# Patient Record
Sex: MALE | Race: WHITE | Employment: OTHER | URBAN - METROPOLITAN AREA
[De-identification: names, ages, dates, MRNs, and addresses within clinical notes are randomized per-mention and may not be internally consistent; named-entity substitution may affect disease eponyms.]

---

## 2017-07-25 RX ORDER — ALPRAZOLAM 0.5 MG/1
TABLET ORAL
Qty: 90 TAB | Refills: 0 | Status: SHIPPED | OUTPATIENT
Start: 2017-07-25 | End: 2018-01-30 | Stop reason: SDUPTHER

## 2017-07-28 RX ORDER — ALPRAZOLAM 0.5 MG/1
TABLET ORAL
Status: CANCELLED | OUTPATIENT
Start: 2017-07-28

## 2017-07-28 RX ORDER — ALPRAZOLAM 0.5 MG
0.5 TABLET ORAL
Qty: 90 TAB | Refills: 0 | OUTPATIENT
Start: 2017-07-28 | End: 2017-11-03 | Stop reason: SDUPTHER

## 2017-07-28 NOTE — TELEPHONE ENCOUNTER
Requested Prescriptions     Pending Prescriptions Disp Refills    XANAX 0.5 mg tablet 90 Tab 0     Sig: Take 1 Tab by mouth three (3) times daily as needed for Anxiety. Max Daily Amount: 1.5 mg.        Last Refill: 04/19/2017  Next Appointment:12/21/2017

## 2017-10-31 RX ORDER — TIZANIDINE 4 MG/1
TABLET ORAL
Qty: 30 TAB | Refills: 3 | Status: SHIPPED | OUTPATIENT
Start: 2017-10-31 | End: 2018-02-28 | Stop reason: SDUPTHER

## 2017-11-03 RX ORDER — ALPRAZOLAM 0.5 MG
0.5 TABLET ORAL
Qty: 90 TAB | Refills: 0 | OUTPATIENT
Start: 2017-11-03 | End: 2018-04-27 | Stop reason: SDUPTHER

## 2017-11-03 NOTE — TELEPHONE ENCOUNTER
Requested Prescriptions     Pending Prescriptions Disp Refills    XANAX 0.5 mg tablet 90 Tab 0     Sig: Take 1 Tab by mouth three (3) times daily as needed for Anxiety. Max Daily Amount: 1.5 mg.        Last Refill: 07/28/17  Next Appointment:12/21/17

## 2017-11-10 ENCOUNTER — OFFICE VISIT (OUTPATIENT)
Dept: INTERNAL MEDICINE CLINIC | Age: 82
End: 2017-11-10

## 2017-11-10 VITALS
BODY MASS INDEX: 28.08 KG/M2 | HEART RATE: 79 BPM | WEIGHT: 189.6 LBS | RESPIRATION RATE: 18 BRPM | HEIGHT: 69 IN | OXYGEN SATURATION: 98 % | TEMPERATURE: 98 F | DIASTOLIC BLOOD PRESSURE: 90 MMHG | SYSTOLIC BLOOD PRESSURE: 160 MMHG

## 2017-11-10 DIAGNOSIS — I10 ESSENTIAL HYPERTENSION: Primary | ICD-10-CM

## 2017-11-10 RX ORDER — AMLODIPINE BESYLATE 5 MG/1
5 TABLET ORAL DAILY
Qty: 30 TAB | Refills: 6 | Status: SHIPPED | OUTPATIENT
Start: 2017-11-10 | End: 2018-05-26 | Stop reason: SDUPTHER

## 2017-11-10 NOTE — MR AVS SNAPSHOT
Visit Information Date & Time Provider Department Dept. Phone Encounter #  
 11/10/2017  2:20 PM RACHEL Juarez MD [facility] [id number] Follow-up Instructions Return in about 2 weeks (around 11/24/2017) for as scheduled. Your Appointments 12/21/2017  9:50 AM  
Follow Up with RACHEL Juarez MD  
[address] ([address] [facility]) Appt Note: 6 mo; [address] [id number] [address] [address] Upcoming Health Maintenance Date Due DTaP/Tdap/Td series (1 - Tdap) 5/9/1956 ZOSTER VACCINE AGE 60> 3/9/1995 GLAUCOMA SCREENING Q2Y 5/9/2000 Pneumococcal 65+ Low/Medium Risk (1 of 2 - PCV13) 5/9/2000 MEDICARE YEARLY EXAM 5/9/2000 Influenza Age 5 to Adult 8/1/2017 Allergies as of 11/10/2017  Review Complete On: 11/10/2017 By: Taryn Perkins MD  
 No Known Allergies Current Immunizations  Reviewed on 10/6/2015 No immunizations on file. Not reviewed this visit You Were Diagnosed With   
  
 Codes Comments Essential hypertension    -  Primary ICD-10-CM: I10 
ICD-9-CM: 401.9 Vitals BP Pulse Temp Resp Height(growth percentile) Weight(growth percentile) (!) 209/87 (BP 1 Location: Left arm, BP Patient Position: Sitting) 79 98 °F (36.7 °C) (Oral) 18 5' 9\" (1.753 m) 189 lb 9.6 oz (86 kg) SpO2 BMI Smoking Status 98% 28 kg/m2 Never Smoker Vitals History BMI and BSA Data Body Mass Index Body Surface Area  
 28 kg/m 2 2.05 m 2 Preferred Pharmacy Pharmacy Name Phone [id number] [address] [phone] Your Updated Medication List  
  
   
This list is accurate as of: 11/10/17  4:42 PM.  Always use your most recent med list.  
  
  
  
  
 * ALPRAZolam 0.5 mg tablet Commonly known as:  XANAX  
TAKE 1 TABLET BY MOUTH 3 TIMES A DAY AS NEEDED * XANAX 0.5 mg tablet Generic drug:  ALPRAZolam  
Take 1 Tab by mouth three (3) times daily as needed for Anxiety. Max Daily Amount: 1.5 mg.  
  
 amLODIPine 5 mg tablet Commonly known as:  Wandra Estrella Take 1 Tab by mouth daily. diazePAM 5 mg tablet Commonly known as:  VALIUM Take 1 Tab by mouth every six (6) hours as needed. Max Daily Amount: 20 mg.  
  
 gabapentin 300 mg capsule Commonly known as:  NEURONTIN Take 300 mg by mouth three (3) times daily. oxyCODONE IR 5 mg immediate release tablet Commonly known as:  Chares Ravel Take 1-2 Tabs by mouth every three (3) hours as needed. Max Daily Amount: 80 mg.  
  
 tiZANidine 4 mg tablet Commonly known as:  Natalia Banter TAKE 1 TABLET BY MOUTH AT BEDTIME  
  
 TYLENOL 325 mg tablet Generic drug:  acetaminophen Take 650 mg by mouth every four (4) hours as needed for Pain. * Notice: This list has 2 medication(s) that are the same as other medications prescribed for you. Read the directions carefully, and ask your doctor or other care provider to review them with you. Prescriptions Sent to Pharmacy Refills  
 amLODIPine (NORVASC) 5 mg tablet 6 Sig: Take 1 Tab by mouth daily. Class: Normal  
 Pharmacy: [id number]- [address]  #: [phone] Route: Oral  
  
Follow-up Instructions Return in about 2 weeks (around 11/24/2017) for as scheduled. Introducing [employer]! Bentley Bell introduces [employer] patient portal. Now you can access parts of your medical record, email your doctor's office, and request medication refills online. 1. In your internet browser, go to [email or web address] 2. Click on the First Time User? Click Here link in the Sign In box. You will see the New Member Sign Up page. 3. Enter your CallResto Access Code exactly as it appears below. You will not need to use this code after youve completed the sign-up process. If you do not sign up before the expiration date, you must request a new code. · CallResto Access Code: [email or web address] Expires: 2/8/2018  2:08 PM 
 
4. Enter the last four digits of your Social Security Number (xxxx) and Date of Birth (mm/dd/yyyy) as indicated and click Submit. You will be taken to the next sign-up page. 5. Create a GeoVantaget ID. This will be your CallResto login ID and cannot be changed, so think of one that is secure and easy to remember. 6. Create a CallResto password. You can change your password at any time. 7. Enter your Password Reset Question and Answer. This can be used at a later time if you forget your password. 8. Enter your e-mail address. You will receive e-mail notification when new information is available in [address] 9. Click Sign Up. You can now view and download portions of your medical record. 10. Click the Download Summary menu link to download a portable copy of your medical information. If you have questions, please visit the Frequently Asked Questions section of the CallResto website. Remember, CallResto is NOT to be used for urgent needs. For medical emergencies, dial 911. Now available from your iPhone and Android! Please provide this summary of care documentation to your next provider. Your primary care clinician is listed as RACHEL Porter. If you have any questions after today's visit, please call [phone].

## 2017-11-10 NOTE — PROGRESS NOTES
Identified pt with two pt identifiers(name and ). Reviewed record in preparation for visit and have obtained necessary documentation. Chief Complaint   Patient presents with    Elevated Blood Pressure     Room 7        Health Maintenance Due   Topic    DTaP/Tdap/Td series (1 - Tdap)    ZOSTER VACCINE AGE 60>     GLAUCOMA SCREENING Q2Y     Pneumococcal 65+ Low/Medium Risk (1 of 2 - PCV13)    MEDICARE YEARLY EXAM     Influenza Age 5 to Adult    Patient declined flu vaccination today. Coordination of Care Questionnaire:  :   1) Have you been to an emergency room, urgent care clinic since your last visit? no   Hospitalized since your last visit? no             2. Have seen or consulted any other health care provider since your last visit? NO  If yes, where when, and reason for visit? 3) Do you have an Advanced Directive/ Living Will in place? NO  If yes, do we have a copy on file NO  If no, would you like information NO    Patient is accompanied by self I have received verbal consent from Martinez Domingo to discuss any/all medical information while they are present in the room.

## 2017-11-10 NOTE — PROGRESS NOTES
This note will not be viewable in [address] Ramiro Mccain is a 80 y.o. male and presents with Elevated Blood Pressure (Room 7)  . Subjective:  Mr. Reyes Formosa presents today with complaint of elevated blood pressure. He was noted to be elevated and his dentist office. He is monitored at home and is not consistently elevated but it has been over the past couple of days. He is also complained of a slight headache with this. He was previously on blood pressure medication but has been off of these for at least the past couple of years. He denies any use of decongestants or continuous use of nonsteroidals. Past Medical History:   Diagnosis Date    Chronic pain     chronic back pain    Colon polyps      Past Surgical History:   Procedure Laterality Date    COLONOSCOPY,DIAGNOSTIC  11/11/2014         HX HERNIA REPAIR      HX ORTHOPAEDIC  2008    rods placed in back    HX ORTHOPAEDIC  10/6/15    LEFT L5-S1 MICRODISCECTOMY     HX OTHER SURGICAL      steroid injection for back pain    HI EGD INSERT GUIDE WIRE DILATOR PASSAGE ESOPHAGUS  10/17/2013         HI EGD TRANSORAL BIOPSY SINGLE/MULTIPLE  10/17/2013          No Known Allergies  Current Outpatient Prescriptions   Medication Sig Dispense Refill    amLODIPine (NORVASC) 5 mg tablet Take 1 Tab by mouth daily. 30 Tab 6    tiZANidine (ZANAFLEX) 4 mg tablet TAKE 1 TABLET BY MOUTH AT BEDTIME 30 Tab 3    ALPRAZolam (XANAX) 0.5 mg tablet TAKE 1 TABLET BY MOUTH 3 TIMES A DAY AS NEEDED 90 Tab 0    gabapentin (NEURONTIN) 300 mg capsule Take 300 mg by mouth three (3) times daily.  acetaminophen (TYLENOL) 325 mg tablet Take 650 mg by mouth every four (4) hours as needed for Pain.  XANAX 0.5 mg tablet Take 1 Tab by mouth three (3) times daily as needed for Anxiety. Max Daily Amount: 1.5 mg. 90 Tab 0    diazepam (VALIUM) 5 mg tablet Take 1 Tab by mouth every six (6) hours as needed.  Max Daily Amount: 20 mg. 40 Tab 0    oxyCODONE IR (ROXICODONE) 5 mg immediate release tablet Take 1-2 Tabs by mouth every three (3) hours as needed. Max Daily Amount: 80 mg. 80 Tab 0     Social History     Social History    Marital status:      Spouse name: N/A    Number of children: N/A    Years of education: N/A     Social History Main Topics    Smoking status: Never Smoker    Smokeless tobacco: Never Used    Alcohol use No    Drug use: No    Sexual activity: Not Asked     Other Topics Concern    None     Social History Narrative     Family History   Problem Relation Age of Onset    Lung Disease Mother      lung cancer    Hypertension Mother     Heart Failure Mother     Hypertension Father     Heart Failure Father        Review of Systems  Constitutional:  negative for fevers, chills, anorexia and weight loss  Eyes:    negative for visual disturbance and irritation  ENT:    negative for tinnitus,sore throat,nasal congestion,ear pains. hoarseness  Respiratory:     negative for cough, hemoptysis, dyspnea,wheezing  CV:    negative for chest pain, palpitations, lower extremity edema  GI:    negative for nausea, vomiting, diarrhea, abdominal pain,melena  Endo:               negative for polyuria,polydipsia,polyphagia,heat intolerance  Genitourinary : negative for frequency, dysuria and hematuria  Integumentary: negative for rash and pruritus  Hematologic:   negative for easy bruising and gum/nose bleeding  Musculoskel:  negative for myalgias, arthralgias, back pain, muscle weakness, joint pain  Neurological:   negative for headaches, dizziness, vertigo, memory problems and gait   Behavl/Psych:  negative for feelings of anxiety, depression, mood changes  ROS otherwise negative      Objective:  Visit Vitals    BP (!) 209/87 (BP 1 Location: Left arm, BP Patient Position: Sitting)    Pulse 79    Temp 98 °F (36.7 °C) (Oral)    Resp 18    Ht 5' 9\" (1.753 m)    Wt 189 lb 9.6 oz (86 kg)    SpO2 98%    BMI 28 kg/m2     Physical Exam:   General appearance - alert, well appearing, and in no distress  Mental status - alert, oriented to person, place, and time  EYE-TRACY, EOMI, fundi normal, corneas normal, no foreign bodies  ENT-ENT exam normal, no neck nodes or sinus tenderness  Nose - normal and patent, no erythema, discharge or polyps  Mouth - mucous membranes moist, pharynx normal without lesions  Neck - supple, no significant adenopathy   Chest - clear to auscultation, no wheezes, rales or rhonchi, symmetric air entry   Heart - normal rate, regular rhythm, normal S1, S2, no murmurs, rubs, clicks or gallops   Abdomen - soft, nontender, nondistended, no masses or organomegaly  Lymph- no adenopathy palpable  Ext-peripheral pulses normal, no pedal edema, no clubbing or cyanosis  Skin-Warm and dry. no hyperpigmentation, vitiligo, or suspicious lesions  Neuro -alert, oriented, normal speech, no focal findings or movement disorder noted      Assessment/Plan:  Diagnoses and all orders for this visit:    1. Essential hypertension    Other orders  -     amLODIPine (NORVASC) 5 mg tablet; Take 1 Tab by mouth daily. ICD-10-CM ICD-9-CM    1. Essential hypertension I10 401.9      Plan:    Start amlodipine 5 mg daily. Monitor response to therapy. He will do home blood pressure readings and call me in 2 weeks to let me know how he is doing. Plan follow-up in December as scheduled for routine health maintenance exam.    Follow-up Disposition:  Return in about 2 weeks (around 11/24/2017) for as scheduled. I have reviewed with the patient details of the assessment and plan and all questions were answered. Relevent patient education was performed. Verbal and/or written instructions (see AVS) provided. The most recent lab findings were reviewed with the patient. Plan was discussed with patient who verbally expressed understanding. An After Visit Summary was printed and given to the patient.     Marissa Lloyd MD

## 2017-12-15 PROBLEM — R13.10 DYSPHAGIA: Status: ACTIVE | Noted: 2017-12-15

## 2017-12-15 PROBLEM — M54.30 SCIATICA: Status: ACTIVE | Noted: 2017-12-15

## 2017-12-15 PROBLEM — G47.00 INSOMNIA: Status: ACTIVE | Noted: 2017-12-15

## 2017-12-15 PROBLEM — J30.9 ALLERGIC RHINITIS: Status: ACTIVE | Noted: 2017-12-15

## 2017-12-15 PROBLEM — Z12.5 PROSTATE CANCER SCREENING: Status: ACTIVE | Noted: 2017-12-15

## 2017-12-15 PROBLEM — E78.5 HYPERLIPIDEMIA: Status: ACTIVE | Noted: 2017-12-15

## 2017-12-15 PROBLEM — L30.8 HERPES ZOSTER DERMATITIS: Status: ACTIVE | Noted: 2017-12-15

## 2017-12-15 PROBLEM — K59.00 CONSTIPATION: Status: ACTIVE | Noted: 2017-12-15

## 2017-12-15 PROBLEM — H60.90 OTITIS EXTERNA: Status: ACTIVE | Noted: 2017-12-15

## 2017-12-15 PROBLEM — R53.83 FATIGUE: Status: ACTIVE | Noted: 2017-12-15

## 2017-12-15 PROBLEM — A87.9 MENINGITIS, VIRAL: Status: ACTIVE | Noted: 2017-12-15

## 2017-12-15 PROBLEM — M48.00 SPINAL STENOSIS: Status: ACTIVE | Noted: 2017-12-15

## 2017-12-15 PROBLEM — M53.87 SCIATICA OF LEFT SIDE ASSOCIATED WITH DISORDER OF LUMBOSACRAL SPINE: Status: ACTIVE | Noted: 2017-12-15

## 2017-12-15 PROBLEM — R31.9 HEMATURIA: Status: ACTIVE | Noted: 2017-12-15

## 2017-12-15 PROBLEM — B02.8 HERPES ZOSTER DERMATITIS: Status: ACTIVE | Noted: 2017-12-15

## 2017-12-15 PROBLEM — E87.5 HYPERKALEMIA: Status: ACTIVE | Noted: 2017-12-15

## 2017-12-15 PROBLEM — R03.0 ELEVATED BLOOD PRESSURE READING: Status: ACTIVE | Noted: 2017-12-15

## 2017-12-15 PROBLEM — M19.90 OSTEOARTHRITIS: Status: ACTIVE | Noted: 2017-12-15

## 2017-12-15 PROBLEM — I10 HYPERTENSION: Status: ACTIVE | Noted: 2017-12-15

## 2017-12-15 PROBLEM — M17.12 ARTHRITIS OF KNEE, LEFT: Status: ACTIVE | Noted: 2017-12-15

## 2017-12-15 PROBLEM — R51.9 HEADACHE: Status: ACTIVE | Noted: 2017-12-15

## 2017-12-15 RX ORDER — DICLOFENAC SODIUM 10 MG/G
GEL TOPICAL 4 TIMES DAILY
COMMUNITY
End: 2018-08-03

## 2017-12-21 ENCOUNTER — OFFICE VISIT (OUTPATIENT)
Dept: INTERNAL MEDICINE CLINIC | Age: 82
End: 2017-12-21

## 2017-12-21 VITALS
TEMPERATURE: 97.6 F | DIASTOLIC BLOOD PRESSURE: 69 MMHG | HEIGHT: 69 IN | SYSTOLIC BLOOD PRESSURE: 144 MMHG | HEART RATE: 69 BPM | WEIGHT: 182 LBS | OXYGEN SATURATION: 93 % | RESPIRATION RATE: 17 BRPM | BODY MASS INDEX: 26.96 KG/M2

## 2017-12-21 DIAGNOSIS — E78.00 PURE HYPERCHOLESTEROLEMIA: ICD-10-CM

## 2017-12-21 DIAGNOSIS — R53.83 FATIGUE, UNSPECIFIED TYPE: ICD-10-CM

## 2017-12-21 DIAGNOSIS — I10 ESSENTIAL HYPERTENSION: Primary | ICD-10-CM

## 2017-12-21 LAB
ALBUMIN SERPL-MCNC: 4.1 G/DL (ref 3.9–5.4)
ALKALINE PHOS POC: 71 U/L (ref 38–126)
ALT SERPL-CCNC: 19 U/L (ref 9–52)
AST SERPL-CCNC: 18 U/L (ref 14–36)
BACTERIA UA POCT, BACTPOCT: ABNORMAL
BILIRUB UR QL STRIP: NEGATIVE
BUN BLD-MCNC: 10 MG/DL (ref 9–20)
CALCIUM BLD-MCNC: 9.3 MG/DL (ref 8.4–10.2)
CASTS UA POCT: 0
CHLORIDE BLD-SCNC: 103 MMOL/L (ref 98–107)
CHOLEST SERPL-MCNC: 176 MG/DL (ref 0–200)
CLUE CELLS, CLUEPOCT: NEGATIVE
CO2 POC: 31 MMOL/L (ref 22–32)
CREAT BLD-MCNC: 0.8 MG/DL (ref 0.8–1.5)
CRYSTALS UA POCT, CRYSPOCT: NEGATIVE
EGFR (POC): 83.2
EPITHELIAL CELLS POCT: ABNORMAL
GLUCOSE POC: 93 MG/DL (ref 75–110)
GLUCOSE UR-MCNC: NEGATIVE MG/DL
GRAN# POC: 5.9 K/UL (ref 2–7.8)
GRAN% POC: 78.7 % (ref 37–92)
HCT VFR BLD CALC: 42.4 % (ref 37–51)
HDLC SERPL-MCNC: 52 MG/DL (ref 35–130)
HGB BLD-MCNC: 14.2 G/DL (ref 12–18)
KETONES P FAST UR STRIP-MCNC: NEGATIVE MG/DL
LDL CHOLESTEROL POC: 101.2 MG/DL (ref 0–130)
LY# POC: 1.3 K/UL (ref 0.6–4.1)
LY% POC: 17.7 % (ref 10–58.5)
MCH RBC QN: 32.2 PG (ref 26–32)
MCHC RBC-ENTMCNC: 33.6 G/DL (ref 30–36)
MCV RBC: 96 FL (ref 80–97)
MID #, POC: 0.2 K/UL (ref 0–1.8)
MID% POC: 3.6 % (ref 0.1–24)
MUCUS UA POCT, MUCPOCT: ABNORMAL
PH UR STRIP: 6.5 [PH] (ref 5–7)
PLATELET # BLD: 204 K/UL (ref 140–440)
POTASSIUM SERPL-SCNC: 4.5 MMOL/L (ref 3.6–5)
PROT SERPL-MCNC: 6.8 G/DL (ref 6.3–8.2)
PROT UR QL STRIP: NEGATIVE
RBC # BLD: 4.41 M/UL (ref 4.2–6.3)
RBC UA POCT, RBCPOCT: ABNORMAL
SODIUM SERPL-SCNC: 144 MMOL/L (ref 137–145)
SP GR UR STRIP: 1 (ref 1.01–1.02)
TCHOL/HDL RATIO (POC): 3.4 (ref 0–4)
TOTAL BILIRUBIN POC: 0.8 MG/DL (ref 0.2–1.3)
TRICH UA POCT, TRICHPOC: NEGATIVE
TRIGL SERPL-MCNC: 114 MG/DL (ref 0–200)
UA UROBILINOGEN AMB POC: NORMAL (ref 0.2–1)
URINALYSIS CLARITY POC: CLEAR
URINALYSIS COLOR POC: ABNORMAL
URINE BLOOD POC: NEGATIVE
URINE CULT COMMENT, POCT: ABNORMAL
URINE LEUKOCYTES POC: NEGATIVE
URINE NITRITES POC: NEGATIVE
VLDLC SERPL CALC-MCNC: 22.8 MG/DL
WBC # BLD: 7.4 K/UL (ref 4.1–10.9)
WBC UA POCT, WBCPOCT: 0
YEAST UA POCT, YEASTPOC: NEGATIVE

## 2017-12-21 NOTE — PROGRESS NOTES
Chief Complaint   Patient presents with    Complete Physical    Cold Symptoms     1. Have you been to the ER, urgent care clinic since your last visit? Hospitalized since your last visit? NO    2. Have you seen or consulted any other health care providers outside of the [address] [place] [address] since your last visit? Include any pap smears or colon screening.  NO

## 2017-12-21 NOTE — MR AVS SNAPSHOT
Visit Information Date & Time Provider Department Dept. Phone Encounter #  
 12/21/2017  9:50 AM RACHEL Barr MD [facility] [employer] [facility] [phone] [id number] Follow-up Instructions Return in about 6 months (around 6/21/2018) for follow up. Upcoming Health Maintenance Date Due DTaP/Tdap/Td series (1 - Tdap) 5/9/1956 ZOSTER VACCINE AGE 60> 3/9/1995 GLAUCOMA SCREENING Q2Y 5/9/2000 Pneumococcal 65+ Low/Medium Risk (1 of 2 - PCV13) 5/9/2000 MEDICARE YEARLY EXAM 5/9/2000 Influenza Age 5 to Adult 8/1/2017 Allergies as of 12/21/2017  Review Complete On: 12/21/2017 By: Ash Will MD  
 No Known Allergies Current Immunizations  Reviewed on 10/6/2015 No immunizations on file. Not reviewed this visit You Were Diagnosed With   
  
 Codes Comments Essential hypertension    -  Primary ICD-10-CM: I10 
ICD-9-CM: 401.9 Pure hypercholesterolemia     ICD-10-CM: E78.00 ICD-9-CM: 272.0 Fatigue, unspecified type     ICD-10-CM: R53.83 ICD-9-CM: 780.79 Vitals BP Pulse Temp Resp Height(growth percentile) Weight(growth percentile) 144/69 69 97.6 °F (36.4 °C) (Oral) 17 5' 9\" (1.753 m) 182 lb (82.6 kg) SpO2 BMI Smoking Status 93% 26.88 kg/m2 Never Smoker Vitals History BMI and BSA Data Body Mass Index Body Surface Area  
 26.88 kg/m 2 2.01 m 2 Preferred Pharmacy Pharmacy Name Phone [id number] [address] [phone] Your Updated Medication List  
  
   
This list is accurate as of: 12/21/17 11:17 AM.  Always use your most recent med list.  
  
  
  
  
 * ALPRAZolam 0.5 mg tablet Commonly known as:  XANAX  
TAKE 1 TABLET BY MOUTH 3 TIMES A DAY AS NEEDED * XANAX 0.5 mg tablet Generic drug:  ALPRAZolam  
Take 1 Tab by mouth three (3) times daily as needed for Anxiety.  Max Daily Amount: 1.5 mg.  
  
 amLODIPine 5 mg tablet Commonly known as:  Suzon Salle Take 1 Tab by mouth daily. gabapentin 300 mg capsule Commonly known as:  NEURONTIN Take 300 mg by mouth three (3) times daily. tiZANidine 4 mg tablet Commonly known as:  Katia Mail TAKE 1 TABLET BY MOUTH AT BEDTIME  
  
 TYLENOL 325 mg tablet Generic drug:  acetaminophen Take 650 mg by mouth every four (4) hours as needed for Pain. VOLTAREN 1 % Gel Generic drug:  diclofenac Apply  to affected area four (4) times daily. * Notice: This list has 2 medication(s) that are the same as other medications prescribed for you. Read the directions carefully, and ask your doctor or other care provider to review them with you. We Performed the Following AMB POC COMPLETE CBC,AUTOMATED ENTER D847887 CPT(R)] AMB POC COMPREHENSIVE METABOLIC PANEL [04950 CPT(R)] AMB POC LIPID PROFILE [26003 CPT(R)] AMB POC URINALYSIS DIP STICK AUTO W/ MICRO  [20255 CPT(R)] Follow-up Instructions Return in about 6 months (around 6/21/2018) for follow up. Introducing [facility]! Destiney Henderson introduces POPVOX patient portal. Now you can access parts of your medical record, email your doctor's office, and request medication refills online. 1. In your internet browser, go to [email or web address] 2. Click on the First Time User? Click Here link in the Sign In box. You will see the New Member Sign Up page. 3. Enter your POPVOX Access Code exactly as it appears below. You will not need to use this code after youve completed the sign-up process. If you do not sign up before the expiration date, you must request a new code. · POPVOX Access Code: [email or web address] Expires: 2/8/2018  2:08 PM 
 
4. Enter the last four digits of your Social Security Number (xxxx) and Date of Birth (mm/dd/yyyy) as indicated and click Submit. You will be taken to the next sign-up page. 5. Create a Rachio ID. This will be your Rachio login ID and cannot be changed, so think of one that is secure and easy to remember. 6. Create a Rachio password. You can change your password at any time. 7. Enter your Password Reset Question and Answer. This can be used at a later time if you forget your password. 8. Enter your e-mail address. You will receive e-mail notification when new information is available in [address] 9. Click Sign Up. You can now view and download portions of your medical record. 10. Click the Download Summary menu link to download a portable copy of your medical information. If you have questions, please visit the Frequently Asked Questions section of the Rachio website. Remember, Rachio is NOT to be used for urgent needs. For medical emergencies, dial 911. Now available from your iPhone and Android! Please provide this summary of care documentation to your next provider. Your primary care clinician is listed as RACHEL Yang. If you have any questions after today's visit, please call [phone].

## 2017-12-22 NOTE — PROGRESS NOTES
This note will not be viewable in [address] Ash Gay is a 80 y.o. male and presents with Complete Physical and Cold Symptoms  . Subjective:  Mr. Berta Almanzar presents today for follow-up routine health maintenance exam.  He has had symptoms of sinus congestion and drainage of clear color for the past few days. He has been outside gathering leaves and thinks this may have triggered his reaction. He said no fever chills rigors or pleuritic chest pain. He denies any shortness of breath, chest pain, PND, orthopnea, or pedal edema. His biggest concern is that his legs have become weak and he has persistent back pain and leg weakness. He is status post previous lumbar laminectomy for spinal stenosis. He remains on Norvasc for hypertension. He brings recordings of his blood pressures from home which are mostly adequate with some mildly elevated blood pressures intermittently. He denies any side effects from his medication.     Past Medical History:   Diagnosis Date    Allergic rhinitis 12/15/2017    Arthritis of knee, left 12/15/2017    Chronic pain     chronic back pain    Colon polyps     Constipation 12/15/2017    Dysphagia 12/15/2017    Elevated blood pressure reading 12/15/2017    Fatigue 12/15/2017    Headache 12/15/2017    Hematuria 12/15/2017    Herpes zoster dermatitis 12/15/2017    Hyperkalemia 12/15/2017    Hyperlipidemia 12/15/2017    Hypertension 12/15/2017    Insomnia 12/15/2017    Meningitis, viral 12/15/2017    Osteoarthritis 12/15/2017    Otitis externa 12/15/2017    Prostate cancer screening 12/15/2017    Sciatica 12/15/2017    Sciatica of left side associated with disorder of lumbosacral spine 12/15/2017    Spinal stenosis 12/15/2017     Past Surgical History:   Procedure Laterality Date    COLONOSCOPY,DIAGNOSTIC  11/11/2014         HX HERNIA REPAIR      HX ORTHOPAEDIC  2008    rods placed in back    HX ORTHOPAEDIC  10/6/15    LEFT L5-S1 MICRODISCECTOMY     HX OTHER SURGICAL      steroid injection for back pain    OK EGD INSERT GUIDE WIRE DILATOR PASSAGE ESOPHAGUS  10/17/2013         OK EGD TRANSORAL BIOPSY SINGLE/MULTIPLE  10/17/2013          No Known Allergies  Current Outpatient Prescriptions   Medication Sig Dispense Refill    diclofenac (VOLTAREN) 1 % gel Apply  to affected area four (4) times daily.  amLODIPine (NORVASC) 5 mg tablet Take 1 Tab by mouth daily. 30 Tab 6    XANAX 0.5 mg tablet Take 1 Tab by mouth three (3) times daily as needed for Anxiety. Max Daily Amount: 1.5 mg. 90 Tab 0    tiZANidine (ZANAFLEX) 4 mg tablet TAKE 1 TABLET BY MOUTH AT BEDTIME 30 Tab 3    ALPRAZolam (XANAX) 0.5 mg tablet TAKE 1 TABLET BY MOUTH 3 TIMES A DAY AS NEEDED 90 Tab 0    gabapentin (NEURONTIN) 300 mg capsule Take 300 mg by mouth three (3) times daily.  acetaminophen (TYLENOL) 325 mg tablet Take 650 mg by mouth every four (4) hours as needed for Pain. Social History     Social History    Marital status:      Spouse name: N/A    Number of children: N/A    Years of education: N/A     Social History Main Topics    Smoking status: Never Smoker    Smokeless tobacco: Never Used    Alcohol use No    Drug use: No    Sexual activity: Not Asked     Other Topics Concern    None     Social History Narrative     Family History   Problem Relation Age of Onset    Lung Disease Mother      lung cancer    Hypertension Mother     Heart Failure Mother     Hypertension Father     Heart Failure Father        Review of Systems  Constitutional:  negative for fevers, chills, anorexia and weight loss  Eyes:    negative for visual disturbance and irritation  ENT:    negative for tinnitus,sore throat,nasal congestion,ear pains. hoarseness  Respiratory:     negative for cough, hemoptysis, dyspnea,wheezing  CV:    negative for chest pain, palpitations, lower extremity edema  GI:    negative for nausea, vomiting, diarrhea, abdominal pain,melena  Endo: negative for polyuria,polydipsia,polyphagia,heat intolerance  Genitourinary : negative for frequency, dysuria and hematuria  Integumentary: negative for rash and pruritus  Hematologic:   negative for easy bruising and gum/nose bleeding  Musculoskel:  negative for myalgias, arthralgias, back pain, muscle weakness, joint pain  Neurological:   negative for headaches, dizziness, vertigo, memory problems and gait   Behavl/Psych:  negative for feelings of anxiety, depression  ROS otherwise negative      Objective:  Visit Vitals    /69    Pulse 69    Temp 97.6 °F (36.4 °C) (Oral)    Resp 17    Ht 5' 9\" (1.753 m)    Wt 182 lb (82.6 kg)    SpO2 93%    BMI 26.88 kg/m2     Physical Exam:   General appearance - alert, well appearing, and in no distress  Mental status - alert, oriented to person, place, and time  EYE-TRACY, EOMI, fundi normal, corneas normal, no foreign bodies  ENT-ENT exam normal, no neck nodes or sinus tenderness  Nose - normal and patent, no erythema, discharge or polyps  Mouth - mucous membranes moist, pharynx normal without lesions  Neck - supple, no significant adenopathy   Chest - clear to auscultation, no wheezes, rales or rhonchi, symmetric air entry   Heart - normal rate, regular rhythm, normal S1, S2, no murmurs, rubs, clicks or gallops   Abdomen - soft, nontender, nondistended, no masses or organomegaly  Lymph- no adenopathy palpable  Ext-peripheral pulses normal, no pedal edema, no clubbing or cyanosis  Skin-Warm and dry. no hyperpigmentation, vitiligo, or suspicious lesions  Neuro -alert, oriented, normal speech, no focal findings or movement disorder noted      Assessment/Plan:  Diagnoses and all orders for this visit:    1. Essential hypertension  -     AMB POC COMPREHENSIVE METABOLIC PANEL  -     AMB POC URINALYSIS DIP STICK AUTO W/ MICRO     2. Pure hypercholesterolemia  -     AMB POC LIPID PROFILE    3.  Fatigue, unspecified type  -     AMB POC COMPLETE CBC,AUTOMATED ENTER ICD-10-CM ICD-9-CM    1. Essential hypertension I10 401.9 AMB POC COMPREHENSIVE METABOLIC PANEL      AMB POC URINALYSIS DIP STICK AUTO W/ MICRO    2. Pure hypercholesterolemia E78.00 272.0 AMB POC LIPID PROFILE   3. Fatigue, unspecified type R53.83 780.79 AMB POC COMPLETE CBC,AUTOMATED ENTER     Plan:    I suspect his sinus symptoms are allergic in nature and should resolve without event. He may take an over-the-counter antihistamine as needed. If his symptoms persist return to clinic or call for further instructions. Continue current medical regimen as outlined above. Further recommendations based on lab results. We discussed the fact that physical therapy may be helpful in terms of helping maintain some dexterity and strength of his lower extremities. If his symptoms progress this may be pursued. He is done therapy before and can continue the home exercise program he was given previously. If his feelings of fatigue persist may consider underlying depression as a cause. Consider addition of an SSRI should his symptoms progress. Patient and his wife are traveling to [place] for the winter and will return in several months. Follow-up Disposition:  Return in about 6 months (around 6/21/2018) for follow up. I have reviewed with the patient details of the assessment and plan and all questions were answered. Relevent patient education was performed. Verbal and/or written instructions (see AVS) provided. The most recent lab findings were reviewed with the patient. Plan was discussed with patient who verbally expressed understanding. An After Visit Summary was printed and given to the patient.     Chan Massey MD

## 2018-01-30 DIAGNOSIS — F41.9 ANXIETY: Primary | ICD-10-CM

## 2018-01-30 RX ORDER — ALPRAZOLAM 0.5 MG/1
TABLET ORAL
Qty: 90 TAB | Refills: 0 | Status: SHIPPED | OUTPATIENT
Start: 2018-01-30 | End: 2018-10-21 | Stop reason: SDUPTHER

## 2018-01-30 NOTE — TELEPHONE ENCOUNTER
Requested Prescriptions     Pending Prescriptions Disp Refills    ALPRAZolam (XANAX) 0.5 mg tablet 90 Tab 0       Last Refill: 7/25/17  Next Appointment:6/20/18

## 2018-02-28 RX ORDER — TIZANIDINE 4 MG/1
TABLET ORAL
Qty: 30 TAB | Refills: 0 | Status: SHIPPED | OUTPATIENT
Start: 2018-02-28 | End: 2018-07-26 | Stop reason: SDUPTHER

## 2018-04-09 RX ORDER — TIZANIDINE 4 MG/1
TABLET ORAL
Qty: 30 TAB | Refills: 3 | Status: SHIPPED | OUTPATIENT
Start: 2018-04-09 | End: 2018-08-05 | Stop reason: SDUPTHER

## 2018-04-27 DIAGNOSIS — F41.1 GAD (GENERALIZED ANXIETY DISORDER): Primary | ICD-10-CM

## 2018-04-27 NOTE — TELEPHONE ENCOUNTER
Requested Prescriptions     Pending Prescriptions Disp Refills    XANAX 0.5 mg tablet 90 Tab 0     Sig: Take 1 Tab by mouth three (3) times daily as needed for Anxiety. Max Daily Amount: 1.5 mg.        Last Refill: 11/03/17 Next Appointment:06/20/18

## 2018-04-30 ENCOUNTER — TELEPHONE (OUTPATIENT)
Dept: INTERNAL MEDICINE CLINIC | Age: 83
End: 2018-04-30

## 2018-04-30 RX ORDER — ALPRAZOLAM 0.5 MG
0.5 TABLET ORAL
Qty: 90 TAB | Refills: 0 | OUTPATIENT
Start: 2018-04-30 | End: 2018-07-26 | Stop reason: SDUPTHER

## 2018-04-30 NOTE — TELEPHONE ENCOUNTER
A Xanax script was called into the [facility] pharmacy on 4/27/18 by Charli Zavala LPN. The pharmacy stated they never received this script.   This nurse called the Xanax script into [facility] pharmacy and spoke with Robby Evangelista the pharmacist.    Xanax 0.5 mg tablet  Take 1 tab by mouth three (3) times daily as needed for Anxiety  Max Daily Amount 1.5 mg  #90  0 refills  Walker Newell LPN

## 2018-05-25 ENCOUNTER — HOSPITAL ENCOUNTER (OUTPATIENT)
Dept: MRI IMAGING | Age: 83
Discharge: HOME | End: 2018-05-25
Attending: ORTHOPAEDIC SURGERY
Payer: MEDICARE

## 2018-05-25 DIAGNOSIS — M54.5 LOW BACK PAIN, UNSPECIFIED BACK PAIN LATERALITY, UNSPECIFIED CHRONICITY, WITH SCIATICA PRESENCE UNSPECIFIED: ICD-10-CM

## 2018-05-25 DIAGNOSIS — Z98.890 STATUS POST LUMBAR LAMINECTOMY: ICD-10-CM

## 2018-05-25 PROCEDURE — 72158 MRI LUMBAR SPINE W/O & W/DYE: CPT

## 2018-05-25 PROCEDURE — A9575 INJ GADOTERATE MEGLUMI 0.1ML: HCPCS | Performed by: ORTHOPAEDIC SURGERY

## 2018-05-25 PROCEDURE — 74011250636 HC RX REV CODE- 250/636: Performed by: ORTHOPAEDIC SURGERY

## 2018-05-25 RX ORDER — GADOTERATE MEGLUMINE 376.9 MG/ML
17 INJECTION INTRAVENOUS
Status: COMPLETED | OUTPATIENT
Start: 2018-05-25 | End: 2018-05-25

## 2018-05-25 RX ADMIN — GADOTERATE MEGLUMINE 17 ML: 376.9 INJECTION INTRAVENOUS at 13:49

## 2018-05-27 RX ORDER — AMLODIPINE BESYLATE 5 MG/1
TABLET ORAL
Qty: 30 TAB | Refills: 6 | Status: SHIPPED | OUTPATIENT
Start: 2018-05-27 | End: 2018-10-29 | Stop reason: SDUPTHER

## 2018-06-20 ENCOUNTER — OFFICE VISIT (OUTPATIENT)
Dept: INTERNAL MEDICINE CLINIC | Age: 83
End: 2018-06-20

## 2018-06-20 VITALS
WEIGHT: 185.6 LBS | RESPIRATION RATE: 16 BRPM | HEIGHT: 69 IN | OXYGEN SATURATION: 98 % | DIASTOLIC BLOOD PRESSURE: 74 MMHG | TEMPERATURE: 98.5 F | HEART RATE: 73 BPM | BODY MASS INDEX: 27.49 KG/M2 | SYSTOLIC BLOOD PRESSURE: 172 MMHG

## 2018-06-20 DIAGNOSIS — E78.00 PURE HYPERCHOLESTEROLEMIA: ICD-10-CM

## 2018-06-20 DIAGNOSIS — I10 ESSENTIAL HYPERTENSION: Primary | ICD-10-CM

## 2018-06-20 DIAGNOSIS — K40.90 RIGHT INGUINAL HERNIA: ICD-10-CM

## 2018-06-20 DIAGNOSIS — M48.061 SPINAL STENOSIS OF LUMBAR REGION, UNSPECIFIED WHETHER NEUROGENIC CLAUDICATION PRESENT: ICD-10-CM

## 2018-06-20 NOTE — PROGRESS NOTES
Chief Complaint   Patient presents with    Follow-up     6 month f/u           1. Have you been to the ER, urgent care clinic since your last visit? Hospitalized since your last visit? no    2. Have you seen or consulted any other health care providers outside of the [address] [place] [address] since your last visit? Include any pap smears or colon screening.   no

## 2018-06-20 NOTE — MR AVS SNAPSHOT
[address] [facility] [address] [id number] [phone] Patient: Maco Chaparro MRN: [id number] PLJ:[id number] Visit Information Date & Time Provider Department Dept. Phone Encounter #  
 6/20/2018  9:30 AM RACHEL Siddiqui MD [address] [facility] [phone] [id number] Your Appointments 12/20/2018  1:40 PM  
Follow Up with RACHEL Siddiqui MD  
[facility] ([address]) Appt Note: [address] [id number] [address] [address] Upcoming Health Maintenance Date Due DTaP/Tdap/Td series (1 - Tdap) 5/9/1956 ZOSTER VACCINE AGE 60> 3/9/1995 GLAUCOMA SCREENING Q2Y 5/9/2000 Pneumococcal 65+ Low/Medium Risk (1 of 2 - PCV13) 5/9/2000 MEDICARE YEARLY EXAM 3/14/2018 Influenza Age 5 to Adult 8/1/2018 Allergies as of 6/20/2018  Review Complete On: 6/20/2018 By: Amrita Underwood MD  
 No Known Allergies Current Immunizations  Reviewed on 10/6/2015 No immunizations on file. Not reviewed this visit You Were Diagnosed With   
  
 Codes Comments Essential hypertension    -  Primary ICD-10-CM: I10 
ICD-9-CM: 401.9 Spinal stenosis of lumbar region, unspecified whether neurogenic claudication present     ICD-10-CM: M48.061 
ICD-9-CM: 724.02   
 Pure hypercholesterolemia     ICD-10-CM: E78.00 ICD-9-CM: 272.0 Vitals BP Pulse Temp Resp Height(growth percentile) Weight(growth percentile) 172/74 (BP 1 Location: Left arm, BP Patient Position: Sitting) 73 98.5 °F (36.9 °C) (Oral) 16 5' 9\" (1.753 m) 185 lb 9.6 oz (84.2 kg) SpO2 BMI Smoking Status 98% 27.41 kg/m2 Never Smoker Vitals History BMI and BSA Data Body Mass Index Body Surface Area  
 27.41 kg/m 2 2.02 m 2 Preferred Pharmacy Pharmacy Name Phone [id number] [place], [address] [place] [address] [phone] Your Updated Medication List  
  
   
This list is accurate as of 6/20/18  9:54 AM.  Always use your most recent med list.  
  
  
  
  
 * ALPRAZolam 0.5 mg tablet Commonly known as:  XANAX  
TAKE 1 TABLET BY MOUTH 3 TIMES A DAY AS NEEDED * XANAX 0.5 mg tablet Generic drug:  ALPRAZolam  
Take 1 Tab by mouth three (3) times daily as needed for Anxiety. Max Daily Amount: 1.5 mg.  
  
 amLODIPine 5 mg tablet Commonly known as:  Sundra Lucas TAKE 1 TABLET BY MOUTH DAILY  
  
 gabapentin 300 mg capsule Commonly known as:  NEURONTIN Take 300 mg by mouth three (3) times daily. * tiZANidine 4 mg tablet Commonly known as:  Patsey Beets TAKE 1 TABLET BY MOUTH AT BEDTIME  
  
 * tiZANidine 4 mg tablet Commonly known as:  Patsey Beets TAKE 1 TABLET BY MOUTH AT BEDTIME  
  
 TYLENOL 325 mg tablet Generic drug:  acetaminophen Take 650 mg by mouth every four (4) hours as needed for Pain. VOLTAREN 1 % Gel Generic drug:  diclofenac Apply  to affected area four (4) times daily. * Notice: This list has 4 medication(s) that are the same as other medications prescribed for you. Read the directions carefully, and ask your doctor or other care provider to review them with you. Introducing [facility]! Juany Armendariz introduces [employer] patient portal. Now you can access parts of your medical record, email your doctor's office, and request medication refills online. 1. In your internet browser, go to [email or web address] 2. Click on the First Time User? Click Here link in the Sign In box. You will see the New Member Sign Up page. 3. Enter your [employer] Access Code exactly as it appears below. You will not need to use this code after youve completed the sign-up process. If you do not sign up before the expiration date, you must request a new code. · [employer] Access Code: [email or web address] Expires: 8/15/2018  8:56 AM 
 
4. Enter the last four digits of your Social Security Number (xxxx) and Date of Birth (mm/dd/yyyy) as indicated and click Submit. You will be taken to the next sign-up page. 5. Create a [employer] ID. This will be your [employer] login ID and cannot be changed, so think of one that is secure and easy to remember. 6. Create a [employer] password. You can change your password at any time. 7. Enter your Password Reset Question and Answer. This can be used at a later time if you forget your password. 8. Enter your e-mail address. You will receive e-mail notification when new information is available in [address] 9. Click Sign Up. You can now view and download portions of your medical record. 10. Click the Download Summary menu link to download a portable copy of your medical information. If you have questions, please visit the Frequently Asked Questions section of the [employer] website. Remember, [employer] is NOT to be used for urgent needs. For medical emergencies, dial 911. Now available from your iPhone and Android! Please provide this summary of care documentation to your next provider. Your primary care clinician is listed as RACHEL Aguirre. If you have any questions after today's visit, please call [phone].

## 2018-06-22 NOTE — PROGRESS NOTES
This note will not be viewable in [address] Du[address] is a 80 y.o. male and presents with Follow-up (6 month f/u)  . Subjective:  Mr. Britni Helm presents today for six-month follow-up of hypertension, hyperlipidemia. He is noted swelling in his right groin without discomfort. He has had no change in bowels. He denies shortness breath, chest pain, palpitations, PND, orthopnea, or pedal edema. He is doing well on his current medical regimen. He still has significant back pain and this slows him down. He is unable to do things that he would like to do.     Past Medical History:   Diagnosis Date    Allergic rhinitis 12/15/2017    Arthritis of knee, left 12/15/2017    Chronic pain     chronic back pain    Colon polyps     Constipation 12/15/2017    Dysphagia 12/15/2017    Elevated blood pressure reading 12/15/2017    Fatigue 12/15/2017    Headache 12/15/2017    Hematuria 12/15/2017    Herpes zoster dermatitis 12/15/2017    Hyperkalemia 12/15/2017    Hyperlipidemia 12/15/2017    Hypertension 12/15/2017    Insomnia 12/15/2017    Meningitis, viral 12/15/2017    Osteoarthritis 12/15/2017    Otitis externa 12/15/2017    Prostate cancer screening 12/15/2017    Sciatica 12/15/2017    Sciatica of left side associated with disorder of lumbosacral spine 12/15/2017    Spinal stenosis 12/15/2017     Past Surgical History:   Procedure Laterality Date    COLONOSCOPY,DIAGNOSTIC  11/11/2014         HX HERNIA REPAIR      HX ORTHOPAEDIC  2008    rods placed in back    HX ORTHOPAEDIC  10/6/15    LEFT L5-S1 MICRODISCECTOMY     HX OTHER SURGICAL      steroid injection for back pain    MO EGD INSERT GUIDE WIRE DILATOR PASSAGE ESOPHAGUS  10/17/2013         MO EGD TRANSORAL BIOPSY SINGLE/MULTIPLE  10/17/2013          No Known Allergies  Current Outpatient Prescriptions   Medication Sig Dispense Refill    amLODIPine (NORVASC) 5 mg tablet TAKE 1 TABLET BY MOUTH DAILY 30 Tab 6    XANAX 0.5 mg tablet Take 1 Tab by mouth three (3) times daily as needed for Anxiety. Max Daily Amount: 1.5 mg. 90 Tab 0    tiZANidine (ZANAFLEX) 4 mg tablet TAKE 1 TABLET BY MOUTH AT BEDTIME 30 Tab 3    tiZANidine (ZANAFLEX) 4 mg tablet TAKE 1 TABLET BY MOUTH AT BEDTIME 30 Tab 0    ALPRAZolam (XANAX) 0.5 mg tablet TAKE 1 TABLET BY MOUTH 3 TIMES A DAY AS NEEDED 90 Tab 0    gabapentin (NEURONTIN) 300 mg capsule Take 300 mg by mouth three (3) times daily.  acetaminophen (TYLENOL) 325 mg tablet Take 650 mg by mouth every four (4) hours as needed for Pain.  diclofenac (VOLTAREN) 1 % gel Apply  to affected area four (4) times daily. Social History     Social History    Marital status:      Spouse name: N/A    Number of children: N/A    Years of education: N/A     Social History Main Topics    Smoking status: Never Smoker    Smokeless tobacco: Never Used    Alcohol use No    Drug use: No    Sexual activity: Not Asked     Other Topics Concern    None     Social History Narrative     Family History   Problem Relation Age of Onset    Lung Disease Mother      lung cancer    Hypertension Mother     Heart Failure Mother     Hypertension Father     Heart Failure Father        Review of Systems  Constitutional:  negative for fevers, chills, anorexia and weight loss  Eyes:    negative for visual disturbance and irritation  ENT:    negative for tinnitus,sore throat,nasal congestion,ear pains. hoarseness  Respiratory:     negative for cough, hemoptysis, dyspnea,wheezing  CV:    negative for chest pain, palpitations, lower extremity edema  GI:    negative for nausea, vomiting, diarrhea, abdominal pain,melena  Endo:               negative for polyuria,polydipsia,polyphagia,heat intolerance  Genitourinary : negative for frequency, dysuria and hematuria  Integumentary: negative for rash and pruritus  Hematologic:   negative for easy bruising and gum/nose bleeding  Musculoskel:  negative for myalgias, arthralgias, muscle weakness, joint pain  Neurological:   negative for headaches, dizziness, vertigo, memory problems and gait   Behavl/Psych:  negative for feelings of anxiety, depression, mood changes  ROS otherwise negative      Objective:  Visit Vitals    /74 (BP 1 Location: Left arm, BP Patient Position: Sitting)    Pulse 73    Temp 98.5 °F (36.9 °C) (Oral)    Resp 16    Ht 5' 9\" (1.753 m)    Wt 185 lb 9.6 oz (84.2 kg)    SpO2 98%    BMI 27.41 kg/m2     Physical Exam:   General appearance - alert, well appearing, and in no distress  Mental status - alert, oriented to person, place, and time  EYE-TRACY, EOMI, fundi normal, corneas normal, no foreign bodies  ENT-ENT exam normal, no neck nodes or sinus tenderness  Nose - normal and patent, no erythema, discharge or polyps  Mouth - mucous membranes moist, pharynx normal without lesions  Neck - supple, no significant adenopathy   Chest - clear to auscultation, no wheezes, rales or rhonchi, symmetric air entry   Heart - normal rate, regular rhythm, normal S1, S2, no murmurs, rubs, clicks or gallops   Abdomen - soft, nontender, nondistended, no masses or organomegaly, right inguinal hernia noted, easily reducible, no pain or discomfort  Lymph- no adenopathy palpable  Ext-peripheral pulses normal, no pedal edema, no clubbing or cyanosis  Skin-Warm and dry. no hyperpigmentation, vitiligo, or suspicious lesions  Neuro -alert, oriented, normal speech, no focal findings or movement disorder noted      Assessment/Plan:  Diagnoses and all orders for this visit:    1. Essential hypertension    2. Spinal stenosis of lumbar region, unspecified whether neurogenic claudication present    3. Pure hypercholesterolemia    4. Right inguinal hernia  -     [facility]          ICD-10-CM ICD-9-CM    1. Essential hypertension I10 401.9    2. Spinal stenosis of lumbar region, unspecified whether neurogenic claudication present M48.061 724.02    3.  Pure hypercholesterolemia E78.00 272.0    4. Right inguinal hernia K40.90 550.90 REFERRAL TO GENERAL SURGERY     Plan:    The patient's blood pressure is stable. I reviewed his labs from his previous office visit and his lipid profile and metabolic panel are excellent. He will be referred to surgery for evaluation of his right inguinal hernia. He will continue his current regimen for back pain. Follow-up Disposition: Not on File    I have reviewed with the patient details of the assessment and plan and all questions were answered. Relevent patient education was performed. Verbal and/or written instructions (see AVS) provided. The most recent lab findings were reviewed with the patient. Plan was discussed with patient who verbally expressed understanding. An After Visit Summary was printed and given to the patient.     Ewa Wells MD

## 2018-07-02 ENCOUNTER — OFFICE VISIT (OUTPATIENT)
Dept: SURGERY | Age: 83
End: 2018-07-02

## 2018-07-02 VITALS
RESPIRATION RATE: 18 BRPM | TEMPERATURE: 97.9 F | HEART RATE: 73 BPM | DIASTOLIC BLOOD PRESSURE: 77 MMHG | OXYGEN SATURATION: 94 % | SYSTOLIC BLOOD PRESSURE: 162 MMHG | WEIGHT: 185 LBS | HEIGHT: 69 IN | BODY MASS INDEX: 27.4 KG/M2

## 2018-07-02 DIAGNOSIS — K40.90 RIGHT INGUINAL HERNIA: Primary | ICD-10-CM

## 2018-07-02 NOTE — MR AVS SNAPSHOT
[address], [address] [facility], [address] 
[phone] Patient: Nirmal Zimmerman MRN: [id number] WTN:[id number] Visit Information Date & Time Provider Department Dept. Phone Encounter #  
 7/2/2018 10:40 AM Ann Marie Perla MD [facility] [place] [id number] Your Appointments 12/20/2018  1:40 PM  
Follow Up with MD Sampson Rudolph 26 ([facility] Appt Note: [address] [phone] [address] [address] Upcoming Health Maintenance Date Due DTaP/Tdap/Td series (1 - Tdap) 5/9/1956 ZOSTER VACCINE AGE 60> 3/9/1995 GLAUCOMA SCREENING Q2Y 5/9/2000 Pneumococcal 65+ Low/Medium Risk (1 of 2 - PCV13) 5/9/2000 MEDICARE YEARLY EXAM 3/14/2018 Influenza Age 5 to Adult 8/1/2018 Allergies as of 7/2/2018  Review Complete On: 7/2/2018 By: Zackary Parra No Known Allergies Current Immunizations  Reviewed on 10/6/2015 No immunizations on file. Not reviewed this visit Vitals BP Pulse Temp Resp Height(growth percentile) Weight(growth percentile) 162/77 (BP 1 Location: Left arm, BP Patient Position: Sitting) 73 97.9 °F (36.6 °C) 18 5' 9\" (1.753 m) 185 lb (83.9 kg) SpO2 BMI Smoking Status 94% 27.32 kg/m2 Never Smoker BMI and BSA Data Body Mass Index Body Surface Area  
 27.32 kg/m 2 2.02 m 2 Preferred Pharmacy Pharmacy Name Phone [id number], [address] [phone] Your Updated Medication List  
  
   
This list is accurate as of 7/2/18  4:51 PM.  Always use your most recent med list.  
  
  
  
  
 * ALPRAZolam 0.5 mg tablet Commonly known as:  Neil Fine TAKE 1 TABLET BY MOUTH 3 TIMES A DAY AS NEEDED * XANAX 0.5 mg tablet Generic drug:  ALPRAZolam  
Take 1 Tab by mouth three (3) times daily as needed for Anxiety. Max Daily Amount: 1.5 mg.  
  
 amLODIPine 5 mg tablet Commonly known as:  Perry Conine TAKE 1 TABLET BY MOUTH DAILY  
  
 gabapentin 300 mg capsule Commonly known as:  NEURONTIN Take 300 mg by mouth three (3) times daily. * tiZANidine 4 mg tablet Commonly known as:  Del Mount TAKE 1 TABLET BY MOUTH AT BEDTIME  
  
 * tiZANidine 4 mg tablet Commonly known as:  Del Mount TAKE 1 TABLET BY MOUTH AT BEDTIME  
  
 TYLENOL 325 mg tablet Generic drug:  acetaminophen Take 650 mg by mouth every four (4) hours as needed for Pain. VOLTAREN 1 % Gel Generic drug:  diclofenac Apply  to affected area four (4) times daily. * Notice: This list has 4 medication(s) that are the same as other medications prescribed for you. Read the directions carefully, and ask your doctor or other care provider to review them with you. Introducing [facility]! Harjinder Steinberg introduces [employer] patient portal. Now you can access parts of your medical record, email your doctor's office, and request medication refills online. 1. In your internet browser, go to [email or web address] 2. Click on the First Time User? Click Here link in the Sign In box. You will see the New Member Sign Up page. 3. Enter your [employer] Access Code exactly as it appears below. You will not need to use this code after youve completed the sign-up process. If you do not sign up before the expiration date, you must request a new code. · [employer] Access Code: [email or web address] Expires: 8/15/2018  8:56 AM 
 
4. Enter the last four digits of your Social Security Number (xxxx) and Date of Birth (mm/dd/yyyy) as indicated and click Submit. You will be taken to the next sign-up page. 5. Create a JumpSeat ID. This will be your [employer]Seat login ID and cannot be changed, so think of one that is secure and easy to remember. 6. Create a JumpSeat password. You can change your password at any time. 7. Enter your Password Reset Question and Answer. This can be used at a later time if you forget your password. 8. Enter your e-mail address. You will receive e-mail notification when new information is available in [address] 9. Click Sign Up. You can now view and download portions of your medical record. 10. Click the Download Summary menu link to download a portable copy of your medical information. If you have questions, please visit the Frequently Asked Questions section of the [employer] website. Remember, [employer]Seat is NOT to be used for urgent needs. For medical emergencies, dial 911. Now available from your iPhone and Android! Please provide this summary of care documentation to your next provider. Your primary care clinician is listed as RACHEL Lopez. If you have any questions after today's visit, please call [phone].

## 2018-07-02 NOTE — PROGRESS NOTES
Discussed advanced directive. Patient states that he does not have an advanced directive. 1. Have you been to the ER, urgent care clinic since your last visit? Hospitalized since your last visit? No    2. Have you seen or consulted any other health care providers outside of the [address] [place] [address] since your last visit? Include any pap smears or colon screening.  No

## 2018-07-26 ENCOUNTER — OFFICE VISIT (OUTPATIENT)
Dept: INTERNAL MEDICINE CLINIC | Age: 83
End: 2018-07-26

## 2018-07-26 VITALS
SYSTOLIC BLOOD PRESSURE: 138 MMHG | HEIGHT: 69 IN | RESPIRATION RATE: 16 BRPM | BODY MASS INDEX: 27 KG/M2 | TEMPERATURE: 97.7 F | OXYGEN SATURATION: 94 % | HEART RATE: 72 BPM | DIASTOLIC BLOOD PRESSURE: 78 MMHG | WEIGHT: 182.3 LBS

## 2018-07-26 DIAGNOSIS — Z01.818 PREOP EXAMINATION: Primary | ICD-10-CM

## 2018-07-26 DIAGNOSIS — I10 ESSENTIAL HYPERTENSION: ICD-10-CM

## 2018-07-26 PROBLEM — R03.0 ELEVATED BLOOD PRESSURE READING: Status: RESOLVED | Noted: 2017-12-15 | Resolved: 2018-07-26

## 2018-07-26 PROBLEM — R51.9 HEADACHE: Status: RESOLVED | Noted: 2017-12-15 | Resolved: 2018-07-26

## 2018-07-26 PROBLEM — E78.5 HYPERLIPIDEMIA: Status: RESOLVED | Noted: 2017-12-15 | Resolved: 2018-07-26

## 2018-07-26 PROBLEM — H60.90 OTITIS EXTERNA: Status: RESOLVED | Noted: 2017-12-15 | Resolved: 2018-07-26

## 2018-07-26 PROBLEM — R13.10 DYSPHAGIA: Status: RESOLVED | Noted: 2017-12-15 | Resolved: 2018-07-26

## 2018-07-26 PROBLEM — R31.9 HEMATURIA: Status: RESOLVED | Noted: 2017-12-15 | Resolved: 2018-07-26

## 2018-07-26 PROBLEM — R53.83 FATIGUE: Status: RESOLVED | Noted: 2017-12-15 | Resolved: 2018-07-26

## 2018-07-26 NOTE — PROGRESS NOTES
Chief Complaint   Patient presents with    Pre-op Exam     room      1. Have you been to the ER, urgent care clinic since your last visit? Hospitalized since your last visit? NO    2. Have you seen or consulted any other health care providers outside of the [address] [place] [address] since your last visit? Include any pap smears or colon screening. DR. Jada Pantoja      PT IS HERE FOR PRE-OP EXAM. PT IS HAVING BACK SURGERY ON 8/21/20018 WITH DR. Jada Pantoja. PT HAS APPT FOR PRE-OP LABS AND EKG ON 8/7/2018 AT [facility].

## 2018-07-26 NOTE — PROGRESS NOTES
This note will not be viewable in [address] Marisabel Lantigua is a 80 y.o. male and presents with Pre-op Exam (room 2)  . Subjective:    Mr. Mane Suarez presents today for preoperative evaluation prior to undergoing elective back surgery with Dr. Jorge Carlisle on August 21. He denies chest pain, palpitations, PND, orthopnea, or pedal edema. He remains on amlodipine 5 mg daily for hypertension. He has no history of coronary disease, diabetes, tobacco use, or hyperlipidemia. His last LDL cholesterol was 101 with an HDL of 52 and December of last year. His blood sugar at that time was 93. He has not had a repeat A1c but will have labs done at [facility] before his surgical procedure.       Past Medical History:   Diagnosis Date    Allergic rhinitis 12/15/2017    Arthritis of knee, left 12/15/2017    Chronic pain     chronic back pain    Colon polyps     Constipation 12/15/2017    Dysphagia 12/15/2017    Elevated blood pressure reading 12/15/2017    Fatigue 12/15/2017    Headache 12/15/2017    Hematuria 12/15/2017    Herpes zoster dermatitis 12/15/2017    Hyperkalemia 12/15/2017    Hypertension 12/15/2017    Insomnia 12/15/2017    Meningitis, viral 12/15/2017    Osteoarthritis 12/15/2017    Otitis externa 12/15/2017    Prostate cancer screening 12/15/2017    Sciatica 12/15/2017    Sciatica of left side associated with disorder of lumbosacral spine 12/15/2017    Spinal stenosis 12/15/2017     Past Surgical History:   Procedure Laterality Date    COLONOSCOPY,DIAGNOSTIC  11/11/2014         HX HERNIA REPAIR      HX ORTHOPAEDIC  2008    rods placed in back    HX ORTHOPAEDIC  10/6/15    LEFT L5-S1 MICRODISCECTOMY     HX OTHER SURGICAL      steroid injection for back pain    [facility] EGD INSERT GUIDE WIRE DILATOR PASSAGE ESOPHAGUS  10/17/2013         NH EGD TRANSORAL BIOPSY SINGLE/MULTIPLE  10/17/2013          No Known Allergies  Current Outpatient Prescriptions   Medication Sig Dispense Refill    amLODIPine (NORVASC) 5 mg tablet TAKE 1 TABLET BY MOUTH DAILY 30 Tab 6    tiZANidine (ZANAFLEX) 4 mg tablet TAKE 1 TABLET BY MOUTH AT BEDTIME 30 Tab 3    ALPRAZolam (XANAX) 0.5 mg tablet TAKE 1 TABLET BY MOUTH 3 TIMES A DAY AS NEEDED 90 Tab 0    gabapentin (NEURONTIN) 300 mg capsule Take 300 mg by mouth three (3) times daily.  acetaminophen (TYLENOL) 325 mg tablet Take 650 mg by mouth every four (4) hours as needed for Pain.  diclofenac (VOLTAREN) 1 % gel Apply  to affected area four (4) times daily. Social History     Social History    Marital status:      Spouse name: N/A    Number of children: N/A    Years of education: N/A     Social History Main Topics    Smoking status: Never Smoker    Smokeless tobacco: Never Used    Alcohol use No    Drug use: No    Sexual activity: Not Asked     Other Topics Concern    None     Social History Narrative     Family History   Problem Relation Age of Onset    Lung Disease Mother      lung cancer    Hypertension Mother     Heart Failure Mother     Hypertension Father     Heart Failure Father        Review of Systems  Constitutional:  negative for fevers, chills, anorexia and weight loss  Eyes:    negative for visual disturbance and irritation  ENT:    negative for tinnitus,sore throat,nasal congestion,ear pains. hoarseness  Respiratory:     negative for cough, hemoptysis, dyspnea,wheezing  CV:    negative for chest pain, palpitations, lower extremity edema  GI:    negative for nausea, vomiting, diarrhea, abdominal pain,melena  Endo:               negative for polyuria,polydipsia,polyphagia,heat intolerance  Genitourinary : negative for frequency, dysuria and hematuria  Integumentary: negative for rash and pruritus  Hematologic:   negative for easy bruising and gum/nose bleeding  Musculoskel:  negative for myalgias,  joint pain positive for back pain and lower extremity pain  Neurological:   negative for headaches, dizziness, vertigo, memory problems and gait   Behavl/Psych:  negative for feelings of anxiety, depression, mood changes  ROS otherwise negative      Objective:  Visit Vitals    /78 (BP 1 Location: Left arm, BP Patient Position: Sitting)    Pulse 72    Temp 97.7 °F (36.5 °C) (Oral)    Resp 16    Ht 5' 9\" (1.753 m)    Wt 182 lb 4.8 oz (82.7 kg)    SpO2 94%    BMI 26.92 kg/m2     Physical Exam:   General appearance - alert, well appearing, and in no distress  Mental status - alert, oriented to person, place, and time  EYE-TRACY, EOMI, fundi normal, corneas normal, no foreign bodies  ENT-ENT exam normal, no neck nodes or sinus tenderness  Nose - normal and patent, no erythema, discharge or polyps  Mouth - mucous membranes moist, pharynx normal without lesions  Neck - supple, no significant adenopathy   Chest - clear to auscultation, no wheezes, rales or rhonchi, symmetric air entry   Heart - normal rate, regular rhythm, normal S1, S2, no murmurs, rubs, clicks or gallops   Abdomen - soft, nontender, nondistended, no masses or organomegaly  Lymph- no adenopathy palpable  Ext-peripheral pulses normal, no pedal edema, no clubbing or cyanosis  Skin-Warm and dry. no hyperpigmentation, vitiligo, or suspicious lesions  Neuro -alert, oriented, normal speech, grossly nonfocal      Assessment/Plan:  Diagnoses and all orders for this visit:    1. Preop examination  -     AMB POC EKG ROUTINE W/ 12 LEADS, INTER & REP    2. Essential hypertension          ICD-10-CM ICD-9-CM    1. Preop examination Z01.818 V72.84 AMB POC EKG ROUTINE W/ 12 LEADS, INTER & REP   2. Essential hypertension I10 401.9      Plan:    The patient is low to moderate risk based on his age and history of hypertension. His EKG is normal and he has no other significant risk factors that would preclude him from having surgery or would require further risk stratification. Follow-up Disposition:  Return for as scheduled.     I have reviewed with the patient details of the assessment and plan and all questions were answered. Relevent patient education was performed. Verbal and/or written instructions (see AVS) provided. The most recent lab findings were reviewed with the patient. Plan was discussed with patient who verbally expressed understanding. An After Visit Summary was printed and given to the patient.     Juan Fox MD

## 2018-07-27 ENCOUNTER — APPOINTMENT (OUTPATIENT)
Dept: CT IMAGING | Age: 83
End: 2018-07-27
Attending: EMERGENCY MEDICINE
Payer: MEDICARE

## 2018-07-27 ENCOUNTER — APPOINTMENT (OUTPATIENT)
Dept: GENERAL RADIOLOGY | Age: 83
End: 2018-07-27
Attending: EMERGENCY MEDICINE
Payer: MEDICARE

## 2018-07-27 ENCOUNTER — HOSPITAL ENCOUNTER (EMERGENCY)
Age: 83
Discharge: HOME | End: 2018-07-27
Attending: EMERGENCY MEDICINE
Payer: MEDICARE

## 2018-07-27 VITALS
HEIGHT: 69 IN | RESPIRATION RATE: 18 BRPM | OXYGEN SATURATION: 99 % | SYSTOLIC BLOOD PRESSURE: 143 MMHG | WEIGHT: 182 LBS | BODY MASS INDEX: 26.96 KG/M2 | HEART RATE: 77 BPM | DIASTOLIC BLOOD PRESSURE: 61 MMHG

## 2018-07-27 DIAGNOSIS — K57.30 DIVERTICULOSIS OF COLON: ICD-10-CM

## 2018-07-27 DIAGNOSIS — R10.9 ACUTE LEFT FLANK PAIN: Primary | ICD-10-CM

## 2018-07-27 DIAGNOSIS — R11.0 NAUSEA WITHOUT VOMITING: ICD-10-CM

## 2018-07-27 DIAGNOSIS — K40.90 RIGHT INGUINAL HERNIA: ICD-10-CM

## 2018-07-27 LAB
ALBUMIN SERPL-MCNC: 3.9 G/DL (ref 3.5–5)
ALBUMIN/GLOB SERPL: 1.2 {RATIO} (ref 1.1–2.2)
ALP SERPL-CCNC: 83 U/L (ref 45–117)
ALT SERPL-CCNC: 29 U/L (ref 12–78)
ANION GAP SERPL CALC-SCNC: 6 MMOL/L (ref 5–15)
APPEARANCE UR: CLEAR
AST SERPL-CCNC: 12 U/L (ref 15–37)
BASOPHILS # BLD: 0 K/UL (ref 0–0.1)
BASOPHILS NFR BLD: 0 % (ref 0–1)
BILIRUB SERPL-MCNC: 0.7 MG/DL (ref 0.2–1)
BILIRUB UR QL: NEGATIVE
BUN SERPL-MCNC: 17 MG/DL (ref 6–20)
BUN/CREAT SERPL: 20 (ref 12–20)
CALCIUM SERPL-MCNC: 9.1 MG/DL (ref 8.5–10.1)
CHLORIDE SERPL-SCNC: 105 MMOL/L (ref 97–108)
CK MB CFR SERPL CALC: 3.6 % (ref 0–2.5)
CK MB SERPL-MCNC: 2.4 NG/ML (ref 5–25)
CK SERPL-CCNC: 67 U/L (ref 39–308)
CO2 SERPL-SCNC: 31 MMOL/L (ref 21–32)
COLOR UR: NORMAL
CREAT SERPL-MCNC: 0.86 MG/DL (ref 0.7–1.3)
DIFFERENTIAL METHOD BLD: ABNORMAL
EOSINOPHIL # BLD: 0.1 K/UL (ref 0–0.4)
EOSINOPHIL NFR BLD: 1 % (ref 0–7)
ERYTHROCYTE [DISTWIDTH] IN BLOOD BY AUTOMATED COUNT: 12.6 % (ref 11.5–14.5)
GLOBULIN SER CALC-MCNC: 3.3 G/DL (ref 2–4)
GLUCOSE SERPL-MCNC: 98 MG/DL (ref 65–100)
GLUCOSE UR STRIP.AUTO-MCNC: NEGATIVE MG/DL
HCT VFR BLD AUTO: 45.3 % (ref 36.6–50.3)
HGB BLD-MCNC: 15.4 G/DL (ref 12.1–17)
HGB UR QL STRIP: NEGATIVE
IMM GRANULOCYTES # BLD: 0.1 K/UL (ref 0–0.04)
IMM GRANULOCYTES NFR BLD AUTO: 1 % (ref 0–0.5)
KETONES UR QL STRIP.AUTO: NEGATIVE MG/DL
LEUKOCYTE ESTERASE UR QL STRIP.AUTO: NEGATIVE
LIPASE SERPL-CCNC: 230 U/L (ref 73–393)
LYMPHOCYTES # BLD: 2.2 K/UL (ref 0.8–3.5)
LYMPHOCYTES NFR BLD: 23 % (ref 12–49)
MAGNESIUM SERPL-MCNC: 2.3 MG/DL (ref 1.6–2.4)
MCH RBC QN AUTO: 31.8 PG (ref 26–34)
MCHC RBC AUTO-ENTMCNC: 34 G/DL (ref 30–36.5)
MCV RBC AUTO: 93.4 FL (ref 80–99)
MONOCYTES # BLD: 1 K/UL (ref 0–1)
MONOCYTES NFR BLD: 10 % (ref 5–13)
NEUTS SEG # BLD: 6.1 K/UL (ref 1.8–8)
NEUTS SEG NFR BLD: 64 % (ref 32–75)
NITRITE UR QL STRIP.AUTO: NEGATIVE
NRBC # BLD: 0 K/UL (ref 0–0.01)
NRBC BLD-RTO: 0 PER 100 WBC
PH UR STRIP: 8 [PH] (ref 5–8)
PLATELET # BLD AUTO: 203 K/UL (ref 150–400)
PMV BLD AUTO: 9.3 FL (ref 8.9–12.9)
POTASSIUM SERPL-SCNC: 4 MMOL/L (ref 3.5–5.1)
PROT SERPL-MCNC: 7.2 G/DL (ref 6.4–8.2)
PROT UR STRIP-MCNC: NEGATIVE MG/DL
RBC # BLD AUTO: 4.85 M/UL (ref 4.1–5.7)
SODIUM SERPL-SCNC: 142 MMOL/L (ref 136–145)
SP GR UR REFRACTOMETRY: 1.01 (ref 1–1.03)
TROPONIN I SERPL-MCNC: <0.05 NG/ML
UROBILINOGEN UR QL STRIP.AUTO: 0.2 EU/DL (ref 0.2–1)
WBC # BLD AUTO: 9.5 K/UL (ref 4.1–11.1)

## 2018-07-27 PROCEDURE — 83735 ASSAY OF MAGNESIUM: CPT | Performed by: EMERGENCY MEDICINE

## 2018-07-27 PROCEDURE — 74174 CTA ABD&PLVS W/CONTRAST: CPT

## 2018-07-27 PROCEDURE — 80053 COMPREHEN METABOLIC PANEL: CPT | Performed by: EMERGENCY MEDICINE

## 2018-07-27 PROCEDURE — 74176 CT ABD & PELVIS W/O CONTRAST: CPT

## 2018-07-27 PROCEDURE — 93005 ELECTROCARDIOGRAM TRACING: CPT

## 2018-07-27 PROCEDURE — 71045 X-RAY EXAM CHEST 1 VIEW: CPT

## 2018-07-27 PROCEDURE — 81003 URINALYSIS AUTO W/O SCOPE: CPT | Performed by: EMERGENCY MEDICINE

## 2018-07-27 PROCEDURE — 74011250637 HC RX REV CODE- 250/637: Performed by: EMERGENCY MEDICINE

## 2018-07-27 PROCEDURE — 99285 EMERGENCY DEPT VISIT HI MDM: CPT

## 2018-07-27 PROCEDURE — 96374 THER/PROPH/DIAG INJ IV PUSH: CPT

## 2018-07-27 PROCEDURE — 74011636320 HC RX REV CODE- 636/320: Performed by: EMERGENCY MEDICINE

## 2018-07-27 PROCEDURE — 82550 ASSAY OF CK (CPK): CPT | Performed by: EMERGENCY MEDICINE

## 2018-07-27 PROCEDURE — 36415 COLL VENOUS BLD VENIPUNCTURE: CPT | Performed by: EMERGENCY MEDICINE

## 2018-07-27 PROCEDURE — 96375 TX/PRO/DX INJ NEW DRUG ADDON: CPT

## 2018-07-27 PROCEDURE — 74011250636 HC RX REV CODE- 250/636: Performed by: EMERGENCY MEDICINE

## 2018-07-27 PROCEDURE — 85025 COMPLETE CBC W/AUTO DIFF WBC: CPT | Performed by: EMERGENCY MEDICINE

## 2018-07-27 PROCEDURE — 84484 ASSAY OF TROPONIN QUANT: CPT | Performed by: EMERGENCY MEDICINE

## 2018-07-27 PROCEDURE — 83690 ASSAY OF LIPASE: CPT | Performed by: EMERGENCY MEDICINE

## 2018-07-27 RX ORDER — LIDOCAINE 50 MG/G
PATCH TOPICAL
Qty: 3 EACH | Refills: 0 | Status: ON HOLD | OUTPATIENT
Start: 2018-07-27 | End: 2018-08-06

## 2018-07-27 RX ORDER — SODIUM CHLORIDE 9 MG/ML
50 INJECTION, SOLUTION INTRAVENOUS
Status: COMPLETED | OUTPATIENT
Start: 2018-07-27 | End: 2018-07-27

## 2018-07-27 RX ORDER — ONDANSETRON 4 MG/1
4 TABLET, ORALLY DISINTEGRATING ORAL
Status: COMPLETED | OUTPATIENT
Start: 2018-07-27 | End: 2018-07-27

## 2018-07-27 RX ORDER — SODIUM CHLORIDE 0.9 % (FLUSH) 0.9 %
10 SYRINGE (ML) INJECTION
Status: COMPLETED | OUTPATIENT
Start: 2018-07-27 | End: 2018-07-27

## 2018-07-27 RX ORDER — ONDANSETRON 4 MG/1
4 TABLET, ORALLY DISINTEGRATING ORAL
Qty: 10 TAB | Refills: 0 | Status: SHIPPED | OUTPATIENT
Start: 2018-07-27 | End: 2018-08-03

## 2018-07-27 RX ORDER — DIAZEPAM 5 MG/1
5 TABLET ORAL
Status: COMPLETED | OUTPATIENT
Start: 2018-07-27 | End: 2018-07-27

## 2018-07-27 RX ORDER — ONDANSETRON 2 MG/ML
4 INJECTION INTRAMUSCULAR; INTRAVENOUS
Status: DISCONTINUED | OUTPATIENT
Start: 2018-07-27 | End: 2018-07-28 | Stop reason: HOSPADM

## 2018-07-27 RX ORDER — GABAPENTIN 300 MG/1
300 CAPSULE ORAL
Status: COMPLETED | OUTPATIENT
Start: 2018-07-27 | End: 2018-07-27

## 2018-07-27 RX ORDER — OXYCODONE AND ACETAMINOPHEN 5; 325 MG/1; MG/1
1 TABLET ORAL
Status: COMPLETED | OUTPATIENT
Start: 2018-07-27 | End: 2018-07-27

## 2018-07-27 RX ORDER — FENTANYL CITRATE 50 UG/ML
50 INJECTION, SOLUTION INTRAMUSCULAR; INTRAVENOUS
Status: DISCONTINUED | OUTPATIENT
Start: 2018-07-27 | End: 2018-07-28 | Stop reason: HOSPADM

## 2018-07-27 RX ORDER — LIDOCAINE 50 MG/G
1 PATCH TOPICAL
Status: DISCONTINUED | OUTPATIENT
Start: 2018-07-27 | End: 2018-07-28 | Stop reason: HOSPADM

## 2018-07-27 RX ADMIN — GABAPENTIN 300 MG: 300 CAPSULE ORAL at 18:24

## 2018-07-27 RX ADMIN — Medication 10 ML: at 19:23

## 2018-07-27 RX ADMIN — SODIUM CHLORIDE 50 ML/HR: 900 INJECTION, SOLUTION INTRAVENOUS at 19:23

## 2018-07-27 RX ADMIN — OXYCODONE HYDROCHLORIDE AND ACETAMINOPHEN 1 TABLET: 5; 325 TABLET ORAL at 15:55

## 2018-07-27 RX ADMIN — ONDANSETRON 4 MG: 4 TABLET, ORALLY DISINTEGRATING ORAL at 15:55

## 2018-07-27 RX ADMIN — IOPAMIDOL 100 ML: 755 INJECTION, SOLUTION INTRAVENOUS at 19:23

## 2018-07-27 RX ADMIN — DIAZEPAM 5 MG: 5 TABLET ORAL at 18:24

## 2018-07-27 NOTE — ED PROVIDER NOTES
EMERGENCY DEPARTMENT HISTORY AND PHYSICAL EXAM 
 
 
Date: 7/27/2018 Patient Name: Nirmal Zimmerman History of Presenting Illness Chief Complaint Patient presents with  Back Pain Pt wheeled to triage, states he is scheduled for back surgery with Dr. Kelly Collins on 8/21; pt with c/o lower R back pain-chronic, pt states bending over to get toilet paper yesterday and believes he pulled his L lower back now-pain x 0330 this am  
 Nausea Pt states lower back pain radiating to mid abdomen, \"I feel bloated but I'm not sure if it's coming from my back\"; states nausea, no vomiting History Provided By: Patient HPI: Nirmal Zimmerman, 80 y.o. male with PMHx significant for constipation, fatigue, arthritis, Sciatica, presents ambulatory to the ED with multiple complaints including acute exacerbation of his chronic back pain, left sided lower back pain that radiates forward to the abdomen, right sided lower back pain that radiates down the leg with associated numbness, nausea, vomiting. Has a hx of multiple prior back surgeries and chronic back pain. 3-4 months ago has pain radiating down his right side with associated numbness of the right leg worse with ambulation. States he cannot ambulate more than 5 feet without feeling weak and a cramping feeling of his calf. Reports relief with rest. 2 days ago while in the bathroom he felt left flank pain \"over the kidney\". Yesterday was seen in the office for pre-op and was evaluated. This morning at 3 am felt similar pain, was walking down the stairs and could not, he needed to sit and scoot down the stairs. He also felt nauseous last night. Also last night felt a pain radiating from his abdomen up the midline chest which felt like worse \"indigestion\". He describes his pain from the naval radiating to his left lower back. his pain is worse with exertion and relieved with rest. No hx of kidney stones.  Has not been urinating recently but in the ED had an episode of urination which was larger than normal today. Denies any pain or hematuria. Pt is also constipated, is on Mirulax and his last BM was this morning but only a very small amount. Denies any fever, chills, SOB. Social Hx: - Tobacco (-), - EtOH (-), - illicit drug use (-) There are no other complaints, changes, or physical findings at this time. PCP: Pankaj Gregory MD 
 
Current Facility-Administered Medications Medication Dose Route Frequency Provider Last Rate Last Dose  lidocaine (LIDODERM) 5 % patch 1 Patch  1 Patch TransDERmal NOW Marissa Funez MD   1 Patch at 07/27/18 2011  
 fentaNYL citrate (PF) injection 50 mcg  50 mcg IntraVENous NOW Marissa Funez MD      
 ondansetron [facility]) injection 4 mg  4 mg IntraVENous NOW Marissa Funez MD      
 
Current Outpatient Prescriptions Medication Sig Dispense Refill  ondansetron (ZOFRAN ODT) 4 mg disintegrating tablet Take 1 Tab by mouth every eight (8) hours as needed for Nausea. 10 Tab 0  
 lidocaine (LIDODERM) 5 % Apply patch to the affected area for 12 hours a day and remove for 12 hours a day. 3 Each 0  
 amLODIPine (NORVASC) 5 mg tablet TAKE 1 TABLET BY MOUTH DAILY 30 Tab 6  tiZANidine (ZANAFLEX) 4 mg tablet TAKE 1 TABLET BY MOUTH AT BEDTIME 30 Tab 3  ALPRAZolam (XANAX) 0.5 mg tablet TAKE 1 TABLET BY MOUTH 3 TIMES A DAY AS NEEDED 90 Tab 0  
 diclofenac (VOLTAREN) 1 % gel Apply  to affected area four (4) times daily.  gabapentin (NEURONTIN) 300 mg capsule Take 300 mg by mouth three (3) times daily.  acetaminophen (TYLENOL) 325 mg tablet Take 650 mg by mouth every four (4) hours as needed for Pain. Past History Past Medical History: 
Past Medical History:  
Diagnosis Date  Allergic rhinitis 12/15/2017  Arthritis of knee, left 12/15/2017  Chronic pain   
 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 
Past Surgical History:  
Procedure Laterality Date  COLONOSCOPY,DIAGNOSTIC  11/11/2014  HX HERNIA REPAIR    
 HX ORTHOPAEDIC  2008  
 rods placed in back  HX ORTHOPAEDIC  10/6/15 LEFT L5-S1 MICRODISCECTOMY  HX OTHER SURGICAL    
 steroid injection for back pain  OH EGD INSERT GUIDE WIRE DILATOR PASSAGE ESOPHAGUS  10/17/2013  OH EGD TRANSORAL BIOPSY SINGLE/MULTIPLE  10/17/2013 Family History: 
Family History Problem Relation Age of Onset  Lung Disease Mother   
  lung cancer  Hypertension Mother  Heart Failure Mother  Hypertension Father  Heart Failure Father Social History: 
Social History Substance Use Topics  Smoking status: Never Smoker  Smokeless tobacco: Never Used  Alcohol use No  
 
 
Allergies: 
No Known Allergies Review of Systems Review of Systems Constitutional: Negative for chills and fever. HENT: Negative for congestion. Eyes: Negative. Respiratory: Negative for cough and shortness of breath. Cardiovascular: Negative for chest pain. Gastrointestinal: Positive for abdominal pain, constipation, nausea and vomiting. Negative for blood in stool and diarrhea. Endocrine: Negative for heat intolerance. Genitourinary: Positive for flank pain. Negative for dysuria and hematuria. Musculoskeletal: Positive for back pain and myalgias. Negative for arthralgias and neck pain. Skin: Negative for rash.   
Allergic/Immunologic: Negative for immunocompromised state. Neurological: Positive for weakness and numbness. Negative for dizziness and headaches. Hematological: Does not bruise/bleed easily. Psychiatric/Behavioral: Negative. All other systems reviewed and are negative. Physical Exam  
Physical Exam  
Constitutional: He is oriented to person, place, and time. He appears well-developed and well-nourished. He appears distressed (mild). HENT:  
Head: Normocephalic and atraumatic. Eyes: EOM are normal. Pupils are equal, round, and reactive to light. Neck: Normal range of motion. Neck supple. Cardiovascular: Normal rate, regular rhythm and normal heart sounds. Pulmonary/Chest: Effort normal and breath sounds normal. He has no wheezes. He exhibits no tenderness. Abdominal: Soft. Bowel sounds are normal. There is tenderness in the left lower quadrant. There is no rigidity, no rebound and no guarding. Musculoskeletal: Normal range of motion. He exhibits no edema or tenderness. Legs non tender without edema. Neurological: He is alert and oriented to person, place, and time. No cranial nerve deficit. Sensations intact. Skin: Skin is warm and dry. Psychiatric: He has a normal mood and affect. His behavior is normal.  
Nursing note and vitals reviewed. Diagnostic Study Results Labs - Recent Results (from the past 12 hour(s)) URINALYSIS W/ RFLX MICROSCOPIC Collection Time: 07/27/18  2:49 PM  
Result Value Ref Range Color YELLOW/STRAW Appearance CLEAR CLEAR Specific gravity 1.010 1.003 - 1.030    
 pH (UA) 8.0 5.0 - 8.0 Protein NEGATIVE  NEG mg/dL Glucose NEGATIVE  NEG mg/dL Ketone NEGATIVE  NEG mg/dL Bilirubin NEGATIVE  NEG Blood NEGATIVE  NEG Urobilinogen 0.2 0.2 - 1.0 EU/dL Nitrites NEGATIVE  NEG Leukocyte Esterase NEGATIVE  NEG    
EKG, 12 LEAD, INITIAL Collection Time: 07/27/18  4:15 PM  
Result Value Ref Range  Ventricular Rate 61 BPM  
 Atrial Rate 61 BPM  
 P-R Interval 184 ms QRS Duration 96 ms  
 Q-T Interval 428 ms QTC Calculation (Bezet) 430 ms Calculated P Axis 65 degrees Calculated R Axis 32 degrees Calculated T Axis 42 degrees Diagnosis Normal sinus rhythm Possible Left atrial enlargement No previous ECGs available CBC WITH AUTOMATED DIFF Collection Time: 07/27/18  4:47 PM  
Result Value Ref Range WBC 9.5 4.1 - 11.1 K/uL  
 RBC 4.85 4.10 - 5.70 M/uL  
 HGB 15.4 12.1 - 17.0 g/dL HCT 45.3 36.6 - 50.3 % MCV 93.4 80.0 - 99.0 FL  
 MCH 31.8 26.0 - 34.0 PG  
 MCHC 34.0 30.0 - 36.5 g/dL  
 RDW 12.6 11.5 - 14.5 % PLATELET 802 736 - 479 K/uL MPV 9.3 8.9 - 12.9 FL  
 NRBC 0.0 0  WBC ABSOLUTE NRBC 0.00 0.00 - 0.01 K/uL NEUTROPHILS 64 32 - 75 % LYMPHOCYTES 23 12 - 49 % MONOCYTES 10 5 - 13 % EOSINOPHILS 1 0 - 7 % BASOPHILS 0 0 - 1 % IMMATURE GRANULOCYTES 1 (H) 0.0 - 0.5 % ABS. NEUTROPHILS 6.1 1.8 - 8.0 K/UL  
 ABS. LYMPHOCYTES 2.2 0.8 - 3.5 K/UL  
 ABS. MONOCYTES 1.0 0.0 - 1.0 K/UL  
 ABS. EOSINOPHILS 0.1 0.0 - 0.4 K/UL  
 ABS. BASOPHILS 0.0 0.0 - 0.1 K/UL  
 ABS. IMM. GRANS. 0.1 (H) 0.00 - 0.04 K/UL  
 DF AUTOMATED METABOLIC PANEL, COMPREHENSIVE Collection Time: 07/27/18  4:47 PM  
Result Value Ref Range Sodium 142 136 - 145 mmol/L Potassium 4.0 3.5 - 5.1 mmol/L Chloride 105 97 - 108 mmol/L  
 CO2 31 21 - 32 mmol/L Anion gap 6 5 - 15 mmol/L Glucose 98 65 - 100 mg/dL BUN 17 6 - 20 MG/DL Creatinine 0.86 0.70 - 1.30 MG/DL  
 BUN/Creatinine ratio 20 12 - 20 GFR est AA >60 >60 ml/min/1.73m2 GFR est non-AA >60 >60 ml/min/1.73m2 Calcium 9.1 8.5 - 10.1 MG/DL Bilirubin, total 0.7 0.2 - 1.0 MG/DL  
 ALT (SGPT) 29 12 - 78 U/L  
 AST (SGOT) 12 (L) 15 - 37 U/L Alk. phosphatase 83 45 - 117 U/L Protein, total 7.2 6.4 - 8.2 g/dL Albumin 3.9 3.5 - 5.0 g/dL Globulin 3.3 2.0 - 4.0 g/dL A-G Ratio 1.2 1.1 - 2.2 LIPASE  Collection Time: 07/27/18  4:47 PM  
Result Value Ref Range Lipase 230 73 - 393 U/L MAGNESIUM Collection Time: 07/27/18  4:47 PM  
Result Value Ref Range Magnesium 2.3 1.6 - 2.4 mg/dL CK W/ CKMB & INDEX Collection Time: 07/27/18  4:47 PM  
Result Value Ref Range CK 67 39 - 308 U/L  
 CK - MB 2.4 <3.6 NG/ML  
 CK-MB Index 3.6 (H) 0 - 2.5    
TROPONIN I Collection Time: 07/27/18  4:47 PM  
Result Value Ref Range Troponin-I, Qt. <0.05 <0.05 ng/mL Radiologic Studies -  
CT Results  (Last 48 hours) 07/27/18 1923  CTA ABDOMEN PELV W CONT Final result Impression:  IMPRESSION:  
   
1. No evidence of aortic dissection Narrative:  INDICATION: Severe back pain COMPARISON: Unenhanced CT same day TECHNIQUE:   
Following the uneventful intravenous administration of 100 cc Isovue-370, thin  
axial images were obtained through the abdomen and pelvis. Coronal and sagittal  
reconstructions were generated. Oral contrast was not administered. CT dose  
reduction was achieved through use of a standardized protocol tailored for this  
examination and automatic exposure control for dose modulation. MIP  
reconstructions were performed FINDINGS:   
LUNG BASES: Clear. INCIDENTALLY IMAGED HEART AND MEDIASTINUM: Unremarkable. LIVER: No mass or biliary dilatation. GALLBLADDER: Unremarkable. SPLEEN: No mass. PANCREAS: No mass or ductal dilatation. ADRENALS: Unremarkable. KIDNEYS: Renal cyst previously described. No hydronephrosis. No stone BOWEL: Diverticulosis of the sigmoid colon. No evidence of acute diverticulitis APPENDIX: Within normal limits PERITONEUM: No ascites or pneumoperitoneum. RETROPERITONEUM: No lymphadenopathy or aortic aneurysm. Minimal atherosclerotic  
disease. No dissection BLADDER: No wall thickening or stone REPRODUCTIVE ORGANS: Prostate mildly enlarged BONES: No destructive bone lesion. Postoperative changes lumbar spine. ADDITIONAL COMMENTS: Right inguinal hernia containing fat  
   
  
 07/27/18 1611  CT ABD PELV WO CONT Final result Impression:  IMPRESSION:  
   
1. No hydronephrosis or renal stone 2. Right inguinal hernia containing fat 3. Diverticulosis. No evidence of acute diverticulitis Narrative:  EXAM:  CT ABD PELV WO CONT INDICATION: Flank pain, stone disease suspected COMPARISON: None CONTRAST:  None. TECHNIQUE:   
Thin axial images were obtained through the abdomen and pelvis. Coronal and  
sagittal reconstructions were generated. Oral contrast was not administered. CT  
dose reduction was achieved through use of a standardized protocol tailored for  
this examination and automatic exposure control for dose modulation. The absence of intravenous contrast material reduces the sensitivity for  
evaluation of the solid parenchymal organs of the abdomen. FINDINGS:   
LUNG BASES: Clear. INCIDENTALLY IMAGED HEART AND MEDIASTINUM: Unremarkable. LIVER: No mass or biliary dilatation. GALLBLADDER: Unremarkable. SPLEEN: No mass. PANCREAS: No mass or ductal dilatation. ADRENALS: Unremarkable. KIDNEYS/URETERS: There are round lesions within both kidneys. While nonspecific  
in measure fluid density. The largest is exophytic off the upper pole left  
kidney measuring 7.5 cm. No hydronephrosis or stone STOMACH: Unremarkable. SMALL BOWEL: No dilatation or wall thickening. COLON: Diverticulosis of the sigmoid colon. No evidence of diverticulitis. Stool  
throughout the colon APPENDIX: Unremarkable. PERITONEUM: No ascites or pneumoperitoneum. RETROPERITONEUM: No lymphadenopathy or aortic aneurysm. There are vascular  
calcifications REPRODUCTIVE ORGANS: Prostate is mildly enlarged URINARY BLADDER: No mass or calculus. BONES: Postoperative changes to the lumbar spine. ADDITIONAL COMMENTS: Right groin hernia containing fat CXR Results (Last 48 hours) 07/27/18 1540  XR CHEST PORT Final result Impression:  IMPRESSION:  
1. No acute process Narrative:  EXAM:  XR CHEST PORT INDICATION:  Chest Pain, back pain since yesterday, nausea, hypertension COMPARISON:  10/1/2015 FINDINGS: A portable AP radiograph of the chest was obtained at 1533 hours. The  
patient is on a cardiac monitor. The lungs are clear. The cardiac and the  
heart size is within normal limits. The lungs are well aerated and clear. Visualized osseous structures are unremarkable except for mild degenerative  
changes at the [facility] joint. Medical Decision Making I am the first provider for this patient. I reviewed the vital signs, available nursing notes, past medical history, past surgical history, family history and social history. Vital Signs-Reviewed the patient's vital signs. Patient Vitals for the past 12 hrs: 
 Pulse Resp BP SpO2  
07/27/18 1851 - - 143/61 99 % 07/27/18 1730 - - 138/54 94 %  
07/27/18 1700 - - 139/51 95 %  
07/27/18 1530 - - 157/69 97 %  
07/27/18 1440 - - - 99 % 07/27/18 1439 - - 150/58 -  
07/27/18 1318 77 18 (!) 182/98 99 % EKG interpretation: (Preliminary) 16:15 Rhythm: normal sinus rhythm; and regular . Rate (approx.): 61; Axis: normal; OH interval: normal; QRS interval: normal ; ST/T wave: normal; Other findings: possible left atrial enlargement, no significant change from prior. As interpreted by Rojas Klein MD 
 
Records Reviewed: Nursing Notes, Old Medical Records, Previous Radiology Studies and Previous Laboratory Studies Provider Notes (Medical Decision Making): DDx: Kidney stones, Musculoskeletal pain, sciatica, Reflux, Gastritis, CAD, Pancreatitis, UTI, Herniated disc. ED Course:  
Initial assessment performed. The patients presenting problems have been discussed, and they are in agreement with the care plan formulated and outlined with them.   I have encouraged them to ask questions as they arise throughout their visit. PROGRESS NOTE: 
4:21 PM 
Pt reports his pain was better but when he went to CT his back pain got worse. He does not want any more pain medications. PROGRESS NOTE: 
5:58 PM 
Percocet did not help with his pain. Will order a CTA for the pt. Critical Care Time:  
None. Disposition: 
DISCHARGE NOTE 
8:55 PM 
The patient has been re-evaluated and is ready for discharge. Reviewed available results with patient. Counseled pt on diagnosis and care plan. Pt has expressed understanding, and all questions have been answered. Pt agrees with plan and agrees to F/U as recommended, or return to the ED if their sxs worsen. Discharge instructions have been provided and explained to the pt, along with reasons to return to the ED. PLAN: 
1. Discharge Medication List as of 7/27/2018  8:55 PM  
  
START taking these medications Details  
ondansetron (ZOFRAN ODT) 4 mg disintegrating tablet Take 1 Tab by mouth every eight (8) hours as needed for Nausea., Normal, Disp-10 Tab, R-0  
  
lidocaine (LIDODERM) 5 % Apply patch to the affected area for 12 hours a day and remove for 12 hours a day., Normal, Disp-3 Each, R-0  
  
  
CONTINUE these medications which have NOT CHANGED Details  
amLODIPine (NORVASC) 5 mg tablet TAKE 1 TABLET BY MOUTH DAILY, Normal, Disp-30 Tab, R-6  
  
tiZANidine (ZANAFLEX) 4 mg tablet TAKE 1 TABLET BY MOUTH AT BEDTIME, NormalNEEDS REFILLS PLEASE SEND TO [facility] 5084  [id number] Tab, R-3 ALPRAZolam (XANAX) 0.5 mg tablet TAKE 1 TABLET BY MOUTH 3 TIMES A DAY AS NEEDED, PrintNot to exceed 5 additional fills before 10/16/2017NEEDS REFILL, WANTS BRAND NAME. Disp-90 Tab, R-0  
  
diclofenac (VOLTAREN) 1 % gel Apply  to affected area four (4) times daily. , Historical Med  
  
gabapentin (NEURONTIN) 300 mg capsule Take 300 mg by mouth three (3) times daily. , Historical Med  
  
acetaminophen (TYLENOL) 325 mg tablet Take 650 mg by mouth every four (4) hours as needed for Pain., Historical Med 2. Follow-up Information Follow up With Details Comments Contact Info Mane Edwards MD Call in 3 days  [address] [facility] 
[phone] Noman Barkley MD  As needed [address] [facility] 
[phone] [facility] EMERGENCY DEPT  If symptoms worsen [address] [facility] 
[phone] Return to ED if worse Diagnosis Clinical Impression: 1. Acute left flank pain 2. Nausea without vomiting 3. Right inguinal hernia 4. Diverticulosis of colon Attestations: This note is prepared by Adelina Oviedo acting as Scribe for MD Eva Hall MD : The scribe's documentation has been prepared under my direction and personally reviewed by me in its entirety. I confirm that the note above accurately reflects all work, treatment, procedures, and medical decision making performed by me.

## 2018-07-28 LAB
ATRIAL RATE: 61 BPM
CALCULATED P AXIS, ECG09: 65 DEGREES
CALCULATED R AXIS, ECG10: 32 DEGREES
CALCULATED T AXIS, ECG11: 42 DEGREES
DIAGNOSIS, 93000: NORMAL
P-R INTERVAL, ECG05: 184 MS
Q-T INTERVAL, ECG07: 428 MS
QRS DURATION, ECG06: 96 MS
QTC CALCULATION (BEZET), ECG08: 430 MS
VENTRICULAR RATE, ECG03: 61 BPM

## 2018-07-28 NOTE — DISCHARGE INSTRUCTIONS
Diverticulosis: Care Instructions  Your Care Instructions  In diverticulosis, pouches called diverticula form in the wall of the large intestine (colon). The pouches do not cause any pain or other symptoms. Most people who have diverticulosis do not know they have it. But the pouches sometimes bleed, and if they become infected, they can cause pain and other symptoms. When this happens, it is called diverticulitis. Diverticula form when pressure pushes the wall of the colon outward at certain weak points. A diet that is too low in fiber can cause diverticula. Follow-up care is a key part of your treatment and safety. Be sure to make and go to all appointments, and call your doctor if you are having problems. It's also a good idea to know your test results and keep a list of the medicines you take. How can you care for yourself at home? · Include fruits, leafy green vegetables, beans, and whole grains in your diet each day. These foods are high in fiber. · Take a fiber supplement, such as Citrucel or Metamucil, every day if needed. Read and follow all instructions on the label. · Drink plenty of fluids, enough so that your urine is light yellow or clear like water. If you have kidney, heart, or liver disease and have to limit fluids, talk with your doctor before you increase the amount of fluids you drink. · Get at least 30 minutes of exercise on most days of the week. Walking is a good choice. You also may want to do other activities, such as running, swimming, cycling, or playing tennis or team sports. · Cut out foods that cause gas, pain, or other symptoms. When should you call for help?   Call your doctor now or seek immediate medical care if:    · You have belly pain.     · You pass maroon or very bloody stools.     · You have a fever.     · You have nausea and vomiting.     · You have unusual changes in your bowel movements or abdominal swelling.     · You have burning pain when you urinate.     · You have abnormal vaginal discharge.     · You have shoulder pain.     · You have cramping pain that does not get better when you have a bowel movement or pass gas.     · You pass gas or stool from your urethra while urinating.    Watch closely for changes in your health, and be sure to contact your doctor if you have any problems. Where can you learn more? Go to http://lillian-kirk.info/. Enter P969 in the search box to learn more about \"Diverticulosis: Care Instructions. \"  Current as of: May 12, 2017  Content Version: 11.7  © 7973-1007 [employer]. Care instructions adapted under license by [employer] (which disclaims liability or warranty for this information). If you have questions about a medical condition or this instruction, always ask your healthcare professional. [address] any warranty or liability for your use of this information. Hernia: Care Instructions  Your Care Instructions    A hernia develops when tissue bulges through a weak spot in the wall of your belly. The groin area and the navel are common areas for a hernia. A hernia can also develop near the area of a surgery you had before. Pressure from lifting, straining, or coughing can tear the weak area, causing the hernia to bulge and be painful. If you cannot push a hernia back into place, the tissue may become trapped outside the belly wall. If the hernia gets twisted and loses its blood supply, it will swell and die. This is called a strangulated hernia. It usually causes a lot of pain. It needs treatment right away. Some hernias need to be repaired to prevent a strangulated hernia. If your hernia causes symptoms or is large, you may need surgery. Follow-up care is a key part of your treatment and safety. Be sure to make and go to all appointments, and call your doctor if you are having problems.  It's also a good idea to know your test results and keep a list of the medicines you take. How can you care for yourself at home? · Take care when lifting heavy objects. · Stay at a healthy weight. · Do not smoke. Smoking can cause coughing, which can cause your hernia to bulge. If you need help quitting, talk to your doctor about stop-smoking programs and medicines. These can increase your chances of quitting for good. · Talk with your doctor before wearing a corset or truss for a hernia. These devices are not recommended for treating hernias and sometimes can do more harm than good. There may be certain situations when your doctor thinks a truss would work, but these are rare. When should you call for help? Call your doctor now or seek immediate medical care if:    · You have new or worse belly pain.     · You are vomiting.     · You cannot pass stools or gas.     · You cannot push the hernia back into place with gentle pressure when you are lying down.     · The area over the hernia turns red or becomes tender.    Watch closely for changes in your health, and be sure to contact your doctor if you have any problems. Where can you learn more? Go to http://lillian-kirk.info/. Enter C129 in the search box to learn more about \"Hernia: Care Instructions. \"  Current as of: May 12, 2017  Content Version: 11.7  © 8398-8001 [employer]. Care instructions adapted under license by [employer] (which disclaims liability or warranty for this information). If you have questions about a medical condition or this instruction, always ask your healthcare professional. [place] [address] any warranty or liability for your use of this information. Nausea and Vomiting: Care Instructions  Your Care Instructions    When you are nauseated, you may feel weak and sweaty and notice a lot of saliva in your mouth. Nausea often leads to vomiting.  Most of the time you do not need to worry about nausea and vomiting, but they can be signs of other illnesses. Two common causes of nausea and vomiting are stomach flu and food poisoning. Nausea and vomiting from viral stomach flu will usually start to improve within 24 hours. Nausea and vomiting from food poisoning may last from 12 to 48 hours. The doctor has checked you carefully, but problems can develop later. If you notice any problems or new symptoms, get medical treatment right away. Follow-up care is a key part of your treatment and safety. Be sure to make and go to all appointments, and call your doctor if you are having problems. It's also a good idea to know your test results and keep a list of the medicines you take. How can you care for yourself at home? · To prevent dehydration, drink plenty of fluids, enough so that your urine is light yellow or clear like water. Choose water and other caffeine-free clear liquids until you feel better. If you have kidney, heart, or liver disease and have to limit fluids, talk with your doctor before you increase the amount of fluids you drink. · Rest in bed until you feel better. · When you are able to eat, try clear soups, mild foods, and liquids until all symptoms are gone for 12 to 48 hours. Other good choices include dry toast, crackers, cooked cereal, and gelatin dessert, such as Jell-O. When should you call for help? Call 911 anytime you think you may need emergency care. For example, call if:    · You passed out (lost consciousness).    Call your doctor now or seek immediate medical care if:    · You have symptoms of dehydration, such as:  ¨ Dry eyes and a dry mouth. ¨ Passing only a little dark urine. ¨ Feeling thirstier than usual.     · You have new or worsening belly pain.     · You have a new or higher fever.     · You vomit blood or what looks like coffee grounds.    Watch closely for changes in your health, and be sure to contact your doctor if:    · You have ongoing nausea and vomiting.     · Your vomiting is getting worse.   · Your vomiting lasts longer than 2 days.     · You are not getting better as expected. Where can you learn more? Go to http://lillian-kirk.info/. Enter 25 [id number] in the search box to learn more about \"Nausea and Vomiting: Care Instructions. \"  Current as of: November 20, 2017  Content Version: 11.7  © 3766-1111 [employer]. Care instructions adapted under license by [employer] (which disclaims liability or warranty for this information). If you have questions about a medical condition or this instruction, always ask your healthcare professional. [address] any warranty or liability for your use of this information. Thank you! Thank you for allowing us to provide you with excellent care today. We hope we addressed all of your concerns and needs. We strive to provide excellent quality care in the Emergency Department. You may receive a survey after your visit to evaluate the care you were provided. Should you receive a survey from us, we invite you to share your experience and tell us what made it excellent. It was a pleasure serving you, we invite you to share your experience with us, in our pursuit for excellence, should you be selected to receive a survey. If you feel that you have not received excellent quality care or timely care, please ask to speak to the nurse manager. Please choose us in the future for your continued health care needs. ------------------------------------------------------------------------------------------------------------  The exam and treatment you received in the Emergency Department were for an urgent problem and are not intended as complete care. It is important that you follow up with a doctor, nurse practitioner, or physician assistant for ongoing care.  If your symptoms become worse or you do not improve as expected and you are unable to reach your usual health care provider, you should return to the Emergency Department. We are available 24 hours a day. Please take your discharge instructions with you when you go to your follow-up appointment. If you have any problem arranging a follow-up appointment, contact the Emergency Department immediately. If a prescription has been provided, please have it filled as soon as possible to prevent a delay in treatment. Read the entire medication instruction sheet provided to you by the pharmacy. If you have any questions or reservations about taking the medication due to side effects or interactions with other medications, please call your primary care physician or contact the ER to speak with the charge nurse. Make an appointment with your family doctor or the physician you were referred to for follow-up of this visit as instructed on your discharge paperwork, as this is mandatory follow-up. Return to the ER if you are unable to be seen or if you are unable to be seen in a timely manner. If you have any problem arranging the follow-up visit, contact the Emergency Department immediately.

## 2018-07-28 NOTE — ED NOTES
While returning fentanyl pulled for patient, 100 mcg. Pyxis would not allow due to error message of \"insufficient quantity. \" Wasted entire 100 mcg of fentanyl in sharps container with Enid Hidalgo.

## 2018-07-28 NOTE — ED NOTES
Discharge instructions reviewed with patient. Discharge instructions given to patient per Dr. Lata White. Patient able to return/verbalize discharge instructions. Copy of discharge instructions given. Patient condition stable, respiratory status within normal limits, neuro status intact. Wheeled out of ER, accompanied by Barry Nuñez RN.

## 2018-08-03 ENCOUNTER — HOSPITAL ENCOUNTER (OUTPATIENT)
Dept: PREADMISSION TESTING | Age: 83
Discharge: HOME | DRG: 455 | End: 2018-08-03
Payer: MEDICARE

## 2018-08-03 VITALS
SYSTOLIC BLOOD PRESSURE: 154 MMHG | HEART RATE: 69 BPM | OXYGEN SATURATION: 99 % | DIASTOLIC BLOOD PRESSURE: 66 MMHG | TEMPERATURE: 97.7 F | RESPIRATION RATE: 18 BRPM | BODY MASS INDEX: 26.87 KG/M2 | HEIGHT: 69 IN | WEIGHT: 181.44 LBS

## 2018-08-03 LAB
25(OH)D3 SERPL-MCNC: 28.9 NG/ML (ref 30–100)
ABO + RH BLD: NORMAL
BLOOD GROUP ANTIBODIES SERPL: NORMAL
EST. AVERAGE GLUCOSE BLD GHB EST-MCNC: 108 MG/DL
HBA1C MFR BLD: 5.4 % (ref 4.2–6.3)
INR PPP: 1 (ref 0.9–1.1)
PREALB SERPL-MCNC: 28.9 MG/DL (ref 20–40)
PROTHROMBIN TIME: 10 SEC (ref 9–11.1)
SPECIMEN EXP DATE BLD: NORMAL

## 2018-08-03 PROCEDURE — 36415 COLL VENOUS BLD VENIPUNCTURE: CPT | Performed by: ORTHOPAEDIC SURGERY

## 2018-08-03 PROCEDURE — 82306 VITAMIN D 25 HYDROXY: CPT | Performed by: ORTHOPAEDIC SURGERY

## 2018-08-03 PROCEDURE — 83036 HEMOGLOBIN GLYCOSYLATED A1C: CPT | Performed by: ORTHOPAEDIC SURGERY

## 2018-08-03 PROCEDURE — 85610 PROTHROMBIN TIME: CPT | Performed by: ORTHOPAEDIC SURGERY

## 2018-08-03 PROCEDURE — 84134 ASSAY OF PREALBUMIN: CPT | Performed by: ORTHOPAEDIC SURGERY

## 2018-08-03 PROCEDURE — 86900 BLOOD TYPING SEROLOGIC ABO: CPT | Performed by: ORTHOPAEDIC SURGERY

## 2018-08-03 RX ORDER — SODIUM CHLORIDE, SODIUM LACTATE, POTASSIUM CHLORIDE, CALCIUM CHLORIDE 600; 310; 30; 20 MG/100ML; MG/100ML; MG/100ML; MG/100ML
25 INJECTION, SOLUTION INTRAVENOUS CONTINUOUS
Status: CANCELLED | OUTPATIENT
Start: 2018-08-06

## 2018-08-03 NOTE — PERIOP NOTES
Incentive Julia Suazo Using the incentive spirometer helps expand the small air sacs of your lungs, helps you breathe deeply, and helps improve your lung function. Use your incentive spirometer twice a day (10 breaths each time) prior to surgery. How to Use Your Incentive Spirometer: 1. Hold the incentive spirometer in an upright position. 2. Breathe out as usual.  
3. Place the mouthpiece in your mouth and seal your lips tightly around it. 4. Take a deep breath. Breathe in slowly and as deeply as possible. Keep the blue flow rate guide between the arrows. 5. Hold your breath as long as possible. Then exhale slowly and allow the piston to fall to the bottom of the column. 6. Rest for a few seconds and repeat steps one through five at least 10 times. PAT Tidal Volume___2500______  x____1___  Date____8/6/2018______ BRING THE INCENTIVE SPIROMETER WITH YOU TO THE HOSPITAL ON THE DAY OF YOUR SURGERY. Opportunity given to ask and answer questions as well as to observe return demonstration. Patient signature_____________________________    Witness____________________________

## 2018-08-03 NOTE — PERIOP NOTES
[facility] Preoperative Instructions Surgery Date 8/6/2018          Time of Arrival 5:00 p.m. 
 
1. On the day of your surgery, please report to the Surgical Services Registration Desk and sign in at your designated time. The Surgery Center is located to the right of the Emergency Room. 2. You must have someone with you to drive you home. You should not drive a car for 24 hours following surgery. Please make arrangements for a friend or family member to stay with you for the first 24 hours after your surgery. 3. Do not have anything to eat or drink (including water, gum, mints, coffee, juice) after midnight. ?This may not apply to medications prescribed by your physician. ?(Please note below the special instructions with medications to take the morning of your procedure.) 4. We recommend you do not drink any alcoholic beverages for 24 hours before and after your surgery. 5. Contact your surgeons office for instructions on the following medications: non-steroidal anti-inflammatory drugs (i.e. Advil, Aleve), vitamins, and supplements. (Some surgeons will want you to stop these medications prior to surgery and others may allow you to take them) **If you are currently taking Plavix, Coumadin, Aspirin and/or other blood-thinning agents, contact your surgeon for instructions. ** Your surgeon will partner with the physician prescribing these medications to determine if it is safe to stop or if you need to continue taking. Please do not stop taking these medications without instructions from your surgeon 6. Wear comfortable clothes. Wear glasses instead of contacts. Do not bring any money or jewelry. Please bring picture ID, insurance card, and any prearranged co-payment or hospital payment. Do not wear make-up, particularly mascara the morning of your surgery. Do not wear nail polish, particularly if you are having foot /hand surgery.   Wear your hair loose or down, no ponytails, buns, mago pins or clips. All body piercings must be removed. Please shower with antibacterial soap for three consecutive days before and on the morning of surgery, but do not apply any lotions, powders or deodorants after the shower on the day of surgery. Please use a fresh towels after each shower. Please sleep in clean clothes and change bed linens the night before surgery. Please do not shave for 48 hours prior to surgery. Shaving of the face is acceptable. 7. You should understand that if you do not follow these instructions your surgery may be cancelled. If your physical condition changes (I.e. fever, cold or flu) please contact your surgeon as soon as possible. 8. It is important that you be on time. If a situation occurs where you may be late, please call [phone] (OR Holding Area). 9. If you have any questions and or problems, please call [phone] (Pre-admission Testing). 10. Your surgery time may be subject to change. You will receive a phone call the evening prior if your time changes. 11.  If having outpatient surgery, you must have someone to drive you here, stay with you during the duration of your stay, and to drive you home at time of discharge. 12.   In an effort to improve the efficiency, privacy, and safety for all of our Pre-op patients visitors are not allowed in the Holding area. Once you arrive and are registered your family/visitors will be asked to remain in the waiting room. The Pre-op staff will get you from the Surgical Waiting Area and will explain to you and your family/visitors that the Pre-op phase is beginning. The staff will answer any questions and provide instructions for tracking of the patient, by use of the existing tracking number and color-coded status board in the waiting room.   At this time the staff will also ask for your designated spokesperson information in the event that the physician or staff need to provide an update or obtain any pertinent information. The designated spokesperson will be notified if the physician needs to speak to family during the pre-operative phase. If at any time your family/visitors has questions or concerns they may approach the volunteer desk in the waiting area for assistance. Special Instructions: MEDICATIONS TO TAKE THE MORNING OF SURGERY WITH A SIP OF WATER: amlodipine, tylenol if needed I understand a pre-operative phone call will be made to verify my surgery time. In the event that I am not available, I give permission for a message to be left on my answering service and/or with another person? Yes [phone] or [phone] 
 
 
 
 ___________________      __________   _________ 
  (Signature of Patient)             (Witness)                (Date and Time)

## 2018-08-03 NOTE — PERIOP NOTES
Spoke to Dr. Kaiden Reid regarding patient surgery starting at 7pm.  Patient can have a light breakfast ( a piece of dry toast and 8 ounces clear liquid ) by 11am and then NPO. Communicated this to the patient and he verbalized understanding.

## 2018-08-03 NOTE — PERIOP NOTES
Preventing Infections Before  and After  Your Surgery IMPORTANT INSTRUCTIONS Please read and follow these instructions carefully. Every Night for Three (3) nights before your surgery: 1. Shower with an antibacterial soap, such as Dial, or the soap provided at your preassessment appointment. A shower is better than a bath for cleaning your skin. 2. If needed, ask someone to help you reach all areas of your body. Dont forget to clean your belly button with every shower. The night before your surgery: 
1. On the night before your surgery, shower with an antibacterial soap, such as Dial, or the soap provided at your preassessment appointment. 2. With one packet of Hibiclens in hand, turn water off. 
3. Apply Hibiclens antiseptic skin cleanser with a clean, freshly washed washcloth. ? Gently apply to your body from chin to toes (except the genital area) and especially the area(s) where your incision(s) will be. ? Leave Hibiclens on your skin for at least 20 seconds. CAUTION: If needed, Hibiclens may be used to clean the folds of skin of the legs (such as in the area of the groin) and on your buttocks and hips. However, do not use Hibiclens above the neck or in the genital area (your bottom) or put inside any area of your body. 4. Turn the water back on and rinse. 5. Dry gently with a clean, freshly washed towel. 6. After your shower, do not use any powder, deodorant, perfumes or lotion. 7. Use clean, freshly washed towels and washcloths every time you shower. 8. Wear clean, freshly washed pajamas to bed the night before surgery. 9. Sleep on clean, freshly washed sheets. 10. Do not allow pets to sleep in your bed with you. The Morning of your surgery: 1. Shower again thoroughly with an antibacterial soap, such as Dial or the soap provided at your preassessment appointment. If needed, ask someone for help to reach all areas of your body. Dont forget to clean your belly button! Rinse.  
2. Dry gently with a clean, freshly washed towel. 3. After your shower, do not use any powder, deodorant, perfumes or lotion prior to surgery. 4. Put on clean, freshly washed clothing. Tips to help prevent infections after your surgery: 1. Protect your surgical wound from germs: 
? Hand washing is the most important thing you and your caregivers can do to prevent infections. ? Keep your bandage clean and dry! ? Do not touch your surgical wound. 2. Use clean, freshly washed towels and washcloths every time you shower; do not share bath linens with others. 3. Until your surgical wound is healed, wear clothing and sleep on bed linens each day that are clean and freshly washed. 4. Do not allow pets to sleep in your bed with you or touch your surgical wound. 5. Do not smoke  smoking delays wound healing. This may be a good time to stop smoking. 6. If you have diabetes, it is important for you to manage your blood sugar levels properly before your surgery as well as after your surgery. Poorly managed blood sugar levels slow down wound healing and prevent you from healing completely.

## 2018-08-04 LAB
BACTERIA SPEC CULT: NORMAL
BACTERIA SPEC CULT: NORMAL
SERVICE CMNT-IMP: NORMAL

## 2018-08-06 ENCOUNTER — ANESTHESIA EVENT (OUTPATIENT)
Dept: SURGERY | Age: 83
DRG: 455 | End: 2018-08-06
Payer: MEDICARE

## 2018-08-06 ENCOUNTER — HOSPITAL ENCOUNTER (INPATIENT)
Age: 83
LOS: 2 days | Discharge: HOME HEALTH | DRG: 455 | End: 2018-08-08
Attending: ORTHOPAEDIC SURGERY | Admitting: ORTHOPAEDIC SURGERY
Payer: MEDICARE

## 2018-08-06 ENCOUNTER — APPOINTMENT (OUTPATIENT)
Dept: GENERAL RADIOLOGY | Age: 83
DRG: 455 | End: 2018-08-06
Attending: ORTHOPAEDIC SURGERY
Payer: MEDICARE

## 2018-08-06 ENCOUNTER — ANESTHESIA (OUTPATIENT)
Dept: SURGERY | Age: 83
DRG: 455 | End: 2018-08-06
Payer: MEDICARE

## 2018-08-06 DIAGNOSIS — Z98.1 S/P LUMBAR SPINAL FUSION: Primary | ICD-10-CM

## 2018-08-06 PROCEDURE — 74011250637 HC RX REV CODE- 250/637: Performed by: ORTHOPAEDIC SURGERY

## 2018-08-06 PROCEDURE — 0SG30AJ FUSION OF LUMBOSACRAL JOINT WITH INTERBODY FUSION DEVICE, POSTERIOR APPROACH, ANTERIOR COLUMN, OPEN APPROACH: ICD-10-PCS | Performed by: ORTHOPAEDIC SURGERY

## 2018-08-06 PROCEDURE — 77030013567 HC DRN WND RESERV BARD -A: Performed by: ORTHOPAEDIC SURGERY

## 2018-08-06 PROCEDURE — 74011000250 HC RX REV CODE- 250: Performed by: ORTHOPAEDIC SURGERY

## 2018-08-06 PROCEDURE — C1713 ANCHOR/SCREW BN/BN,TIS/BN: HCPCS | Performed by: ORTHOPAEDIC SURGERY

## 2018-08-06 PROCEDURE — 74011250636 HC RX REV CODE- 250/636

## 2018-08-06 PROCEDURE — 72100 X-RAY EXAM L-S SPINE 2/3 VWS: CPT

## 2018-08-06 PROCEDURE — 77030033138 HC SUT PGA STRATFX J&J -B: Performed by: ORTHOPAEDIC SURGERY

## 2018-08-06 PROCEDURE — 76001 XR FLUOROSCOPY OVER 60 MINUTES: CPT

## 2018-08-06 PROCEDURE — 77030034849: Performed by: ORTHOPAEDIC SURGERY

## 2018-08-06 PROCEDURE — 0RTB0ZZ RESECTION OF THORACOLUMBAR VERTEBRAL DISC, OPEN APPROACH: ICD-10-PCS | Performed by: ORTHOPAEDIC SURGERY

## 2018-08-06 PROCEDURE — 77030018719 HC DRSG PTCH ANTIMIC J&J -A: Performed by: ORTHOPAEDIC SURGERY

## 2018-08-06 PROCEDURE — 77030035236 HC SUT PDS STRATFX BARB J&J -B: Performed by: ORTHOPAEDIC SURGERY

## 2018-08-06 PROCEDURE — 77030008467 HC STPLR SKN COVD -B: Performed by: ORTHOPAEDIC SURGERY

## 2018-08-06 PROCEDURE — 74011000250 HC RX REV CODE- 250

## 2018-08-06 PROCEDURE — 76060000036 HC ANESTHESIA 2.5 TO 3 HR: Performed by: ORTHOPAEDIC SURGERY

## 2018-08-06 PROCEDURE — 74011250636 HC RX REV CODE- 250/636: Performed by: ANESTHESIOLOGY

## 2018-08-06 PROCEDURE — 0SG3071 FUSION OF LUMBOSACRAL JOINT WITH AUTOLOGOUS TISSUE SUBSTITUTE, POSTERIOR APPROACH, POSTERIOR COLUMN, OPEN APPROACH: ICD-10-PCS | Performed by: ORTHOPAEDIC SURGERY

## 2018-08-06 PROCEDURE — 77030029099 HC BN WAX SSPC -A: Performed by: ORTHOPAEDIC SURGERY

## 2018-08-06 PROCEDURE — 77030018836 HC SOL IRR NACL ICUM -A: Performed by: ORTHOPAEDIC SURGERY

## 2018-08-06 PROCEDURE — 74011000272 HC RX REV CODE- 272: Performed by: ORTHOPAEDIC SURGERY

## 2018-08-06 PROCEDURE — 77030035129: Performed by: ORTHOPAEDIC SURGERY

## 2018-08-06 PROCEDURE — 77030014647 HC SEAL FBRN TISSL BAXT -D: Performed by: ORTHOPAEDIC SURGERY

## 2018-08-06 PROCEDURE — 77030037728 HC GRFT BN FBR CORT 3DEMIN 30CC BACT -I: Performed by: ORTHOPAEDIC SURGERY

## 2018-08-06 PROCEDURE — 77030022704 HC SUT VLOC COVD -B: Performed by: ORTHOPAEDIC SURGERY

## 2018-08-06 PROCEDURE — 77030032490 HC SLV COMPR SCD KNE COVD -B: Performed by: ORTHOPAEDIC SURGERY

## 2018-08-06 PROCEDURE — 77030018846 HC SOL IRR STRL H20 ICUM -A: Performed by: ORTHOPAEDIC SURGERY

## 2018-08-06 PROCEDURE — 77030011266 HC ELECTRD BLD INSL COVD -A: Performed by: ORTHOPAEDIC SURGERY

## 2018-08-06 PROCEDURE — 77030037914 HC SPCR SPN ALTERA GLBM -K1: Performed by: ORTHOPAEDIC SURGERY

## 2018-08-06 PROCEDURE — 77030012407 HC DRN WND BARD -B: Performed by: ORTHOPAEDIC SURGERY

## 2018-08-06 PROCEDURE — 74011250636 HC RX REV CODE- 250/636: Performed by: ORTHOPAEDIC SURGERY

## 2018-08-06 PROCEDURE — 77030012961 HC IRR KT CYSTO/TUR ICUM -A: Performed by: ORTHOPAEDIC SURGERY

## 2018-08-06 PROCEDURE — 76210000002 HC OR PH I REC 3 TO 3.5 HR: Performed by: ORTHOPAEDIC SURGERY

## 2018-08-06 PROCEDURE — 74011000250 HC RX REV CODE- 250: Performed by: ANESTHESIOLOGY

## 2018-08-06 PROCEDURE — 77030008684 HC TU ET CUF COVD -B: Performed by: ANESTHESIOLOGY

## 2018-08-06 PROCEDURE — 07DR3ZZ EXTRACTION OF ILIAC BONE MARROW, PERCUTANEOUS APPROACH: ICD-10-PCS | Performed by: ORTHOPAEDIC SURGERY

## 2018-08-06 PROCEDURE — 77030026438 HC STYL ET INTUB CARD -A: Performed by: ANESTHESIOLOGY

## 2018-08-06 PROCEDURE — 77030038844 HC GRFT DMB NEVOS BIOE -G1: Performed by: ORTHOPAEDIC SURGERY

## 2018-08-06 PROCEDURE — 77030019908 HC STETH ESOPH SIMS -A: Performed by: ANESTHESIOLOGY

## 2018-08-06 PROCEDURE — 65270000029 HC RM PRIVATE

## 2018-08-06 PROCEDURE — 77030014007 HC SPNG HEMSTAT J&J -B: Performed by: ORTHOPAEDIC SURGERY

## 2018-08-06 PROCEDURE — 77030013079 HC BLNKT BAIR HGGR 3M -A: Performed by: ANESTHESIOLOGY

## 2018-08-06 PROCEDURE — 77030039267 HC ADH SKN EXOFIN S2SG -B: Performed by: ORTHOPAEDIC SURGERY

## 2018-08-06 PROCEDURE — 4A11X4G MONITORING OF PERIPHERAL NERVOUS ELECTRICAL ACTIVITY, INTRAOPERATIVE, EXTERNAL APPROACH: ICD-10-PCS | Performed by: ORTHOPAEDIC SURGERY

## 2018-08-06 PROCEDURE — 77030003029 HC SUT VCRL J&J -B: Performed by: ORTHOPAEDIC SURGERY

## 2018-08-06 PROCEDURE — 76010000172 HC OR TIME 2.5 TO 3 HR INTENSV-TIER 1: Performed by: ORTHOPAEDIC SURGERY

## 2018-08-06 PROCEDURE — 77030003666 HC NDL SPINAL BD -A: Performed by: ORTHOPAEDIC SURGERY

## 2018-08-06 PROCEDURE — 77030004391 HC BUR FLUT MEDT -C: Performed by: ORTHOPAEDIC SURGERY

## 2018-08-06 DEVICE — ALLOGRAFT BNE SPNG 50X20X7 MM CANC DBM: Type: IMPLANTABLE DEVICE | Site: SPINE LUMBAR | Status: FUNCTIONAL

## 2018-08-06 DEVICE — SCREW SPNL L45MM DIA7MM PEDCL TI ALLY POLYAX CANN THRD MTRX: Type: IMPLANTABLE DEVICE | Site: SPINE LUMBAR | Status: FUNCTIONAL

## 2018-08-06 DEVICE — CAP SPNL CO CHROM ALLY FLAT 1 STP LOK FOR 5.5MM ROD MTRX: Type: IMPLANTABLE DEVICE | Site: SPINE LUMBAR | Status: FUNCTIONAL

## 2018-08-06 DEVICE — ALTERA SPACER, 10 X 36, 9-13MM, 15&DEG;
Type: IMPLANTABLE DEVICE | Site: SPINE LUMBAR | Status: FUNCTIONAL
Brand: ALTERA

## 2018-08-06 DEVICE — SCREW SPNL L50MM DIA7MM PEDCL TI ALLY POLYAX CANN THRD MTRX: Type: IMPLANTABLE DEVICE | Site: SPINE LUMBAR | Status: FUNCTIONAL

## 2018-08-06 DEVICE — IMPLANTABLE DEVICE: Type: IMPLANTABLE DEVICE | Site: SPINE LUMBAR | Status: FUNCTIONAL

## 2018-08-06 DEVICE — GRAFT BNE 3D 30 CC CORTICAL FIBER: Type: IMPLANTABLE DEVICE | Site: SPINE LUMBAR | Status: FUNCTIONAL

## 2018-08-06 DEVICE — HEAD SPNL SCR TI ALLY POLYAX FULL THRD REDUC FOR 5.5MM MTRX: Type: IMPLANTABLE DEVICE | Site: SPINE LUMBAR | Status: FUNCTIONAL

## 2018-08-06 RX ORDER — OXYCODONE HYDROCHLORIDE 5 MG/1
5 TABLET ORAL
Status: DISCONTINUED | OUTPATIENT
Start: 2018-08-06 | End: 2018-08-08 | Stop reason: HOSPADM

## 2018-08-06 RX ORDER — DEXAMETHASONE SODIUM PHOSPHATE 4 MG/ML
INJECTION, SOLUTION INTRA-ARTICULAR; INTRALESIONAL; INTRAMUSCULAR; INTRAVENOUS; SOFT TISSUE AS NEEDED
Status: DISCONTINUED | OUTPATIENT
Start: 2018-08-06 | End: 2018-08-06 | Stop reason: HOSPADM

## 2018-08-06 RX ORDER — ACETAMINOPHEN 10 MG/ML
INJECTION, SOLUTION INTRAVENOUS AS NEEDED
Status: DISCONTINUED | OUTPATIENT
Start: 2018-08-06 | End: 2018-08-06 | Stop reason: HOSPADM

## 2018-08-06 RX ORDER — HYDROMORPHONE HYDROCHLORIDE 1 MG/ML
0.2 INJECTION, SOLUTION INTRAMUSCULAR; INTRAVENOUS; SUBCUTANEOUS
Status: DISCONTINUED | OUTPATIENT
Start: 2018-08-06 | End: 2018-08-06 | Stop reason: HOSPADM

## 2018-08-06 RX ORDER — GLYCOPYRROLATE 0.2 MG/ML
INJECTION INTRAMUSCULAR; INTRAVENOUS AS NEEDED
Status: DISCONTINUED | OUTPATIENT
Start: 2018-08-06 | End: 2018-08-06 | Stop reason: HOSPADM

## 2018-08-06 RX ORDER — DIPHENHYDRAMINE HYDROCHLORIDE 50 MG/ML
12.5 INJECTION, SOLUTION INTRAMUSCULAR; INTRAVENOUS AS NEEDED
Status: DISCONTINUED | OUTPATIENT
Start: 2018-08-06 | End: 2018-08-06 | Stop reason: HOSPADM

## 2018-08-06 RX ORDER — PROPOFOL 10 MG/ML
INJECTION, EMULSION INTRAVENOUS AS NEEDED
Status: DISCONTINUED | OUTPATIENT
Start: 2018-08-06 | End: 2018-08-06 | Stop reason: HOSPADM

## 2018-08-06 RX ORDER — FENTANYL CITRATE 50 UG/ML
25 INJECTION, SOLUTION INTRAMUSCULAR; INTRAVENOUS
Status: COMPLETED | OUTPATIENT
Start: 2018-08-06 | End: 2018-08-06

## 2018-08-06 RX ORDER — OXYCODONE HYDROCHLORIDE 5 MG/1
10-15 TABLET ORAL
Status: DISCONTINUED | OUTPATIENT
Start: 2018-08-06 | End: 2018-08-08 | Stop reason: HOSPADM

## 2018-08-06 RX ORDER — LIDOCAINE HYDROCHLORIDE 10 MG/ML
0.1 INJECTION, SOLUTION EPIDURAL; INFILTRATION; INTRACAUDAL; PERINEURAL AS NEEDED
Status: DISCONTINUED | OUTPATIENT
Start: 2018-08-06 | End: 2018-08-06 | Stop reason: HOSPADM

## 2018-08-06 RX ORDER — SODIUM CHLORIDE 0.9 % (FLUSH) 0.9 %
5-10 SYRINGE (ML) INJECTION AS NEEDED
Status: DISCONTINUED | OUTPATIENT
Start: 2018-08-06 | End: 2018-08-06 | Stop reason: HOSPADM

## 2018-08-06 RX ORDER — SODIUM CHLORIDE, SODIUM LACTATE, POTASSIUM CHLORIDE, CALCIUM CHLORIDE 600; 310; 30; 20 MG/100ML; MG/100ML; MG/100ML; MG/100ML
25 INJECTION, SOLUTION INTRAVENOUS CONTINUOUS
Status: DISCONTINUED | OUTPATIENT
Start: 2018-08-06 | End: 2018-08-06 | Stop reason: HOSPADM

## 2018-08-06 RX ORDER — HYDROMORPHONE HYDROCHLORIDE 1 MG/ML
0.2 INJECTION, SOLUTION INTRAMUSCULAR; INTRAVENOUS; SUBCUTANEOUS
Status: DISCONTINUED | OUTPATIENT
Start: 2018-08-06 | End: 2018-08-06 | Stop reason: SDUPTHER

## 2018-08-06 RX ORDER — SODIUM CHLORIDE 0.9 % (FLUSH) 0.9 %
5-10 SYRINGE (ML) INJECTION EVERY 8 HOURS
Status: DISCONTINUED | OUTPATIENT
Start: 2018-08-06 | End: 2018-08-06 | Stop reason: HOSPADM

## 2018-08-06 RX ORDER — ALPRAZOLAM 0.5 MG/1
0.5 TABLET ORAL
Status: DISCONTINUED | OUTPATIENT
Start: 2018-08-06 | End: 2018-08-08 | Stop reason: HOSPADM

## 2018-08-06 RX ORDER — FENTANYL CITRATE 50 UG/ML
INJECTION, SOLUTION INTRAMUSCULAR; INTRAVENOUS AS NEEDED
Status: DISCONTINUED | OUTPATIENT
Start: 2018-08-06 | End: 2018-08-06 | Stop reason: HOSPADM

## 2018-08-06 RX ORDER — NEOSTIGMINE METHYLSULFATE 1 MG/ML
INJECTION INTRAVENOUS AS NEEDED
Status: DISCONTINUED | OUTPATIENT
Start: 2018-08-06 | End: 2018-08-06 | Stop reason: HOSPADM

## 2018-08-06 RX ORDER — PROCHLORPERAZINE EDISYLATE 5 MG/ML
INJECTION INTRAMUSCULAR; INTRAVENOUS
Status: DISPENSED
Start: 2018-08-06 | End: 2018-08-07

## 2018-08-06 RX ORDER — PHENYLEPHRINE HCL IN 0.9% NACL 0.4MG/10ML
SYRINGE (ML) INTRAVENOUS AS NEEDED
Status: DISCONTINUED | OUTPATIENT
Start: 2018-08-06 | End: 2018-08-06 | Stop reason: HOSPADM

## 2018-08-06 RX ORDER — SUCCINYLCHOLINE CHLORIDE 20 MG/ML
INJECTION INTRAMUSCULAR; INTRAVENOUS AS NEEDED
Status: DISCONTINUED | OUTPATIENT
Start: 2018-08-06 | End: 2018-08-06 | Stop reason: HOSPADM

## 2018-08-06 RX ORDER — KETAMINE HYDROCHLORIDE 10 MG/ML
25 INJECTION, SOLUTION INTRAMUSCULAR; INTRAVENOUS
Status: COMPLETED | OUTPATIENT
Start: 2018-08-06 | End: 2018-08-06

## 2018-08-06 RX ORDER — DIAZEPAM 5 MG/1
5 TABLET ORAL
Status: DISCONTINUED | OUTPATIENT
Start: 2018-08-06 | End: 2018-08-08 | Stop reason: HOSPADM

## 2018-08-06 RX ORDER — SODIUM CHLORIDE 9 MG/ML
125 INJECTION, SOLUTION INTRAVENOUS CONTINUOUS
Status: DISPENSED | OUTPATIENT
Start: 2018-08-06 | End: 2018-08-07

## 2018-08-06 RX ORDER — ROCURONIUM BROMIDE 10 MG/ML
INJECTION, SOLUTION INTRAVENOUS AS NEEDED
Status: DISCONTINUED | OUTPATIENT
Start: 2018-08-06 | End: 2018-08-06 | Stop reason: HOSPADM

## 2018-08-06 RX ORDER — ONDANSETRON 2 MG/ML
INJECTION INTRAMUSCULAR; INTRAVENOUS AS NEEDED
Status: DISCONTINUED | OUTPATIENT
Start: 2018-08-06 | End: 2018-08-06 | Stop reason: HOSPADM

## 2018-08-06 RX ORDER — CEFAZOLIN SODIUM 1 G/3ML
2 INJECTION, POWDER, FOR SOLUTION INTRAMUSCULAR; INTRAVENOUS ONCE
Status: COMPLETED | OUTPATIENT
Start: 2018-08-06 | End: 2018-08-06

## 2018-08-06 RX ORDER — TIZANIDINE 4 MG/1
TABLET ORAL
Qty: 30 TAB | Refills: 3 | Status: SHIPPED | OUTPATIENT
Start: 2018-08-06 | End: 2018-10-31 | Stop reason: SDUPTHER

## 2018-08-06 RX ORDER — KETAMINE HYDROCHLORIDE 100 MG/ML
INJECTION, SOLUTION INTRAMUSCULAR; INTRAVENOUS
Status: DISPENSED
Start: 2018-08-06 | End: 2018-08-07

## 2018-08-06 RX ADMIN — KETAMINE HYDROCHLORIDE 25 MG: 10 INJECTION, SOLUTION INTRAMUSCULAR; INTRAVENOUS at 21:00

## 2018-08-06 RX ADMIN — Medication 80 MCG: at 17:56

## 2018-08-06 RX ADMIN — ROCURONIUM BROMIDE 30 MG: 10 INJECTION, SOLUTION INTRAVENOUS at 17:50

## 2018-08-06 RX ADMIN — Medication 80 MCG: at 19:25

## 2018-08-06 RX ADMIN — Medication 80 MCG: at 18:25

## 2018-08-06 RX ADMIN — ROCURONIUM BROMIDE 20 MG: 10 INJECTION, SOLUTION INTRAVENOUS at 19:25

## 2018-08-06 RX ADMIN — SODIUM CHLORIDE, POTASSIUM CHLORIDE, SODIUM LACTATE AND CALCIUM CHLORIDE: 600; 310; 30; 20 INJECTION, SOLUTION INTRAVENOUS at 17:22

## 2018-08-06 RX ADMIN — FENTANYL CITRATE 100 MCG: 50 INJECTION, SOLUTION INTRAMUSCULAR; INTRAVENOUS at 17:29

## 2018-08-06 RX ADMIN — Medication 120 MCG: at 18:58

## 2018-08-06 RX ADMIN — Medication 80 MCG: at 18:45

## 2018-08-06 RX ADMIN — FENTANYL CITRATE 25 MCG: 50 INJECTION, SOLUTION INTRAMUSCULAR; INTRAVENOUS at 22:21

## 2018-08-06 RX ADMIN — CEFAZOLIN 2 G: 1 INJECTION, POWDER, FOR SOLUTION INTRAMUSCULAR; INTRAVENOUS; PARENTERAL at 17:55

## 2018-08-06 RX ADMIN — PROPOFOL 150 MG: 10 INJECTION, EMULSION INTRAVENOUS at 17:30

## 2018-08-06 RX ADMIN — SUCCINYLCHOLINE CHLORIDE 100 MG: 20 INJECTION INTRAMUSCULAR; INTRAVENOUS at 17:30

## 2018-08-06 RX ADMIN — SODIUM CHLORIDE 125 ML/HR: 900 INJECTION, SOLUTION INTRAVENOUS at 20:35

## 2018-08-06 RX ADMIN — ONDANSETRON 4 MG: 2 INJECTION INTRAMUSCULAR; INTRAVENOUS at 20:02

## 2018-08-06 RX ADMIN — PROPOFOL 50 MG: 10 INJECTION, EMULSION INTRAVENOUS at 17:41

## 2018-08-06 RX ADMIN — HYDROMORPHONE HYDROCHLORIDE 0.2 MG: 1 INJECTION, SOLUTION INTRAMUSCULAR; INTRAVENOUS; SUBCUTANEOUS at 20:49

## 2018-08-06 RX ADMIN — FENTANYL CITRATE 25 MCG: 50 INJECTION, SOLUTION INTRAMUSCULAR; INTRAVENOUS at 20:41

## 2018-08-06 RX ADMIN — SODIUM CHLORIDE, POTASSIUM CHLORIDE, SODIUM LACTATE AND CALCIUM CHLORIDE: 600; 310; 30; 20 INJECTION, SOLUTION INTRAVENOUS at 18:58

## 2018-08-06 RX ADMIN — GLYCOPYRROLATE 0.8 MG: 0.2 INJECTION INTRAMUSCULAR; INTRAVENOUS at 20:02

## 2018-08-06 RX ADMIN — NEOSTIGMINE METHYLSULFATE 5 MG: 1 INJECTION INTRAVENOUS at 20:02

## 2018-08-06 RX ADMIN — FENTANYL CITRATE 100 MCG: 50 INJECTION, SOLUTION INTRAMUSCULAR; INTRAVENOUS at 19:23

## 2018-08-06 RX ADMIN — OXYCODONE HYDROCHLORIDE 10 MG: 5 TABLET ORAL at 20:30

## 2018-08-06 RX ADMIN — FENTANYL CITRATE 25 MCG: 50 INJECTION, SOLUTION INTRAMUSCULAR; INTRAVENOUS at 21:26

## 2018-08-06 RX ADMIN — FENTANYL CITRATE 25 MCG: 50 INJECTION, SOLUTION INTRAMUSCULAR; INTRAVENOUS at 20:36

## 2018-08-06 RX ADMIN — FENTANYL CITRATE 25 MCG: 50 INJECTION, SOLUTION INTRAMUSCULAR; INTRAVENOUS at 21:43

## 2018-08-06 RX ADMIN — FENTANYL CITRATE 25 MCG: 50 INJECTION, SOLUTION INTRAMUSCULAR; INTRAVENOUS at 21:22

## 2018-08-06 RX ADMIN — PROCHLORPERAZINE EDISYLATE 5 MG: 5 INJECTION INTRAMUSCULAR; INTRAVENOUS at 20:51

## 2018-08-06 RX ADMIN — DIAZEPAM 5 MG: 5 TABLET ORAL at 20:30

## 2018-08-06 RX ADMIN — Medication 120 MCG: at 19:33

## 2018-08-06 RX ADMIN — ACETAMINOPHEN 1000 MG: 10 INJECTION, SOLUTION INTRAVENOUS at 18:45

## 2018-08-06 RX ADMIN — FENTANYL CITRATE 25 MCG: 50 INJECTION, SOLUTION INTRAMUSCULAR; INTRAVENOUS at 20:44

## 2018-08-06 RX ADMIN — DEXAMETHASONE SODIUM PHOSPHATE 8 MG: 4 INJECTION, SOLUTION INTRA-ARTICULAR; INTRALESIONAL; INTRAMUSCULAR; INTRAVENOUS; SOFT TISSUE at 17:42

## 2018-08-06 RX ADMIN — Medication 40 MCG: at 18:32

## 2018-08-06 RX ADMIN — FENTANYL CITRATE 50 MCG: 50 INJECTION, SOLUTION INTRAMUSCULAR; INTRAVENOUS at 20:11

## 2018-08-06 RX ADMIN — FENTANYL CITRATE 25 MCG: 50 INJECTION, SOLUTION INTRAMUSCULAR; INTRAVENOUS at 20:48

## 2018-08-06 RX ADMIN — Medication 80 MCG: at 18:50

## 2018-08-06 RX ADMIN — ROCURONIUM BROMIDE 20 MG: 10 INJECTION, SOLUTION INTRAVENOUS at 18:18

## 2018-08-06 NOTE — ROUTINE PROCESS
Patient: Maria M Vasques MRN: [id number]  SSN: [id number]   YOB: 1935  Age: 80 y.o. Sex: male     Patient is status post Procedure(s):  REVISION L5-S1 DECOMPRESSION WITH FUSION. Surgeon(s) and Role:     * Philomena Becerra MD - Primary    Local/Dose/Irrigation:  100mL galaviz solution                   Peripheral IV 08/06/18 Left Antecubital (Active)   Site Assessment Clean, dry, & intact; Ecchymotic (bruised) 8/6/2018  4:28 PM   Phlebitis Assessment 0 8/6/2018  4:28 PM   Infiltration Assessment 0 8/6/2018  4:28 PM   Dressing Status Clean, dry, & intact 8/6/2018  4:28 PM   Dressing Type Tape;Transparent 8/6/2018  4:28 PM   Hub Color/Line Status Pink; Infusing 8/6/2018  4:28 PM       Peripheral IV 08/06/18 Right Arm (Active)            Airway - Endotracheal Tube 08/06/18 Oral (Active)   Line Ahsan Lips 8/6/2018 12:00 AM                   Dressing/Packing:  Wound Back-DRESSING TYPE: Howie Fuse; Vacuum dressing (08/06/18 1947)  Splint/Cast:  ]

## 2018-08-06 NOTE — H&P
Progress notes        Subjective:      Patient ID: Matt Jones is a 80 y.o. male.     Chief Complaint: Follow-up of the Lower Back        HPI:  Matt Jones is a 80 y.o. male with complaints of right leg pain, burning and numbness radiating down the right posterior leg to the foot. Symptoms are worse with walking and standing and pain is relieved completely by sitting for a few minutes. He describes a positive shopping cart sign. He has been taking gabapentin and tizanidine. It is rated 9 out of 10 on the VAS.         Patient Active Problem List     Diagnosis Date Noted    Chronic bilateral low back pain with left-sided sciatica 10/19/2017    Spinal stenosis, lumbar region, with neurogenic claudication 10/19/2017    Left sided sciatica 10/19/2017    Osteoarthritis of spine with radiculopathy, lumbar region 10/19/2017    S/P lumbar fusion 10/19/2017    S/P lumbar laminectomy 10/19/2017            Current Outpatient Prescriptions:     ALPRAZolam (XANAX) 0.5 MG tablet, TAKE 1 TABLET BY MOUTH 3 TIMES A DAY AS NEEDED, Disp: , Rfl:     amLODIPine (NORVASC) 5 MG tablet, Take 5 mg by mouth once daily. , Disp: , Rfl: 1    gabapentin (NEURONTIN) 300 MG capsule, Take 2 capsules (600 mg total) by mouth 3 (three) times a day. (Patient taking differently: Take 300 mg by mouth 3 (three) times a day.  ), Disp: 180 capsule, Rfl: 11    tiZANidine (ZANAFLEX) 4 MG tablet, Take 4 mg by mouth nightly., Disp: , Rfl: 3     No Known Allergies     ROS:   No new bowel or bladder incontinence. No fever. No saddle anesthesia.     Objective:          Vitals:     06/06/18 1424   BP: (!) 152/62         Body mass index is 27.32 kg/m². , a BMI over 30 is considered obese and a BMI over 40 has been associated with a higher risk of surgical complications.     Constitutional: No acute distress. Well nourished. HEENT: Normocephalic. Respiratory:  No labored breathing. Cardiovascular:  No marked cyanosis.   Skin:  No marked skin ulcers/lesions on bilateral upper or lower extremities. Psychiatric: Alert and oriented x3. Inspection: No gross deformity of bilateral upper or lower extremities. Musculoskeletal/Neurological:   .Gait/balance:  - Slow and limping  Thoracolumbar spine:  - No tenderness to palpation  - Full range of motion. Right lower extremity:  - No tenderness to palpation   - Full range of motion  - No pain with internal/external rotation of the hip  - Strength:  - 5 out of 5 to hip flexors  - 5 out of 5 to quads  - 5 out of 5 to TA  - 5 out of 5 to EHL  - 5 out of 5 to Gastroc/Soleus  - Negative straight leg raise  Left lower extremity:  - No tenderness to palpation   - Full range of motion  - No pain with internal/external rotation of the hip  - Strength:  - 5 out of 5 to hip flexors  - 5 out of 5 to quads  - 5 out of 5 to TA  - 5 out of 5 to EHL  - 5 out of 5 to Gastroc/Soleus  - Negative straight leg raise  Sensation:  - Intact to light touch  Reflexes:  - +2 Patellar tendon   - +2 Achilles tendon         Radiographs:           X-ray Lumbar Spine 2 Or 3 Views ([id number])     Result Date: 2018  Shielding: N/A. Standing. AP, Lat.      Notes  Indication back pain.     X-rays of the lumbar spine show a previous lumbar fusion from L2 through   L5 which appears to be healed. There are degenerative changes of the   remaining levels. No fracture or dislocation.        Mri Lumbar Spine With And Without Contrast ([id number])     Result Date: 2018     [facility] - [facility] - MRI LUMB SPINE W WO CONT  Patient: Tita Khanna  : 1935  Date of Service: 2018 2:00:49 PM  Reason For Exam: low back pain, unspecified back pain laterality, unspecified chronicity, with sciatica presence unspecified  Ordering Provider: Fara Michel Physician: Twila Xiong  Signing Date: 2018 2:51:17 PM     EXAM:  MRI LUMB SPINE W WO CONT     INDICATION:  Chronic low back pain and left sciatica.   Previous lumbar spine  surgery.     COMPARISON: MRI lumbar spine on 9/29/2016.     TECHNIQUE: MR imaging of the lumbar spine was performed using the following  sequences: sagittal T1, T2, STIR;  axial T1, T2 prior to and following contrast  administration.      CONTRAST: 17 mL of Dotarem.     FINDINGS: Bilateral laminectomies L2-L4 are unchanged. Posterior fusion hardware  L2-L5 is unchanged. Posterior epidural seromas and granulation tissue are  unchanged.     There is normal alignment of the lumbar spine. Vertebral body heights are  maintained. Marrow signal is normal. There is no evidence of discitis.     The conus medullaris terminates at T12-L1. Signal and caliber of the distal  spinal cord are within normal limits. There is no pathologic intrathecal  enhancement.     Renal cystic lesions are unchanged.     Lower thoracic spine: T11-T12 and T12-L1 mild central spinal canal stenoses are  unchanged.     L1-L2:  Diffuse disc bulge, facet arthrosis, and thickened ligamentum flavum. Severe central spinal canal stenosis is increased. Moderate right and mild left  foraminal stenoses are increased.     L2-L3:  Posterior decompression. No stenosis. No change.     L3-L4:  Posterior decompression. No stenosis. No change.     L4-L5:  Posterior decompression. No stenosis. No change.     L5-S1:  Diffuse disc bulge and facet arthrosis. Moderate central spinal canal  stenosis is increased. Moderate right and mild left foraminal stenoses are  unchanged. IMPRESSION:     1. L1-L2 severe central spinal canal stenosis is increased. 2. L5-S1 moderate central spinal canal stenosis is increased. 3. L2-L5 fusion and posterior decompression.        I independently reviewed the above study(ies) and agree with the findings.      Assessment:          ICD-10-CM   1. Low back pain, unspecified back pain laterality, unspecified chronicity, with sciatica presence unspecified M54.5   2.  Chronic bilateral low back pain with left-sided sciatica M54.42     G89.29   3. Spinal stenosis, lumbar region, with neurogenic claudication M48.062   4. Osteoarthritis of spine with radiculopathy, lumbar region M47.26   5. S/P lumbar fusion Z98.1   6. S/P lumbar laminectomy Z98.890   7. Sciatica of right side M54.31   8. Left sided sciatica M54.32   9. Displacement of lumbar intervertebral disc without myelopathy M51.26         Plan:      I reviewed the MRI findings with Mr. Bertha Brian today and I offered a right L5-S1 and S1 BERRY to hopefully relieve his RLE sciatica caused by spinal stenosis at L5-S1. I also offered a L5-S1 revision decompression and fusion. We are going to try our best to connect the hardware.      I have discussed the procedure in detail with the patient and mentioned complications, including but not limited to: death, permanent disability, heart attack, stroke, lung injury or infection, blindness, ileus, bladder or bowel problems, ureter injury, bleeding, nerve injury (including numbness, pain and weakness), paralysis (which may be permanent), failure to heal, failure to fuse bone together in fusion procedures, failure to relief symptoms, failure to relief pain, increased pain, need for further surgeries, failure or breakage or hardware, malpositioning of hardware, need to fuse or operate on additional levels determined either during or after surgery, destabilization of the spine (which may require fusion or later surgery), infections (which may or may not require additional surgery), dural tears (tears of the sac holding in nerves and spinal fluid), meningitis, voice changes, vocal cord injury, hoarseness, blood clots, pulmonary embolus, Pascale syndrome, recurrent disc herniation, diaphragm paralysis, and anesthetic complications. Comorbidities such as obesity, smoking, rheumatoid arthritis, chronic steroid use and diabetes increase these risks. The patient understands and wants to proceed.      The patient has been prescribed a LSO spinal orthosis.  The orthosis is medically necessary to reduce pain by restricting mobility of the trunk and to otherwise support weak spinal muscles and/or deformed spine. The patient will meet with our bracing coordinator to be fit for the brace.               Procedures            Orders Placed This Encounter    BMI >=25 PATIENT INSTRUCTIONS & EDUCATION      Return call to schedule surgery.      Charting performed by Zachary Forde in the presence of Levon Sheffield MD.  Levon Jarrett MD, personally performed the services described in this documentation, as recorded by the scribe in my presence and it accurately and completely records my words and actions.     This note has been transcribed electronically using voice recognition and a trained scribe. It is believed to be accurate, but may contain errors secondary to technological limitations and other factors.

## 2018-08-06 NOTE — ANESTHESIA PREPROCEDURE EVALUATION
Anesthetic History   No history of anesthetic complications            Review of Systems / Medical History  Patient summary reviewed, nursing notes reviewed and pertinent labs reviewed    Pulmonary  Within defined limits                 Neuro/Psych   Within defined limits           Cardiovascular    Hypertension              Exercise tolerance: >4 METS     GI/Hepatic/Renal  Within defined limits              Endo/Other        Arthritis     Other Findings   Comments: Lumbago      Anesthetic History   No history of anesthetic complications            Review of Systems / Medical History  Patient summary reviewed, nursing notes reviewed and pertinent labs reviewed    Pulmonary  Within defined limits                 Neuro/Psych   Within defined limits           Cardiovascular  Within defined limits                Exercise tolerance: >4 METS     GI/Hepatic/Renal  Within defined limits              Endo/Other  Within defined limits           Other Findings              Physical Exam    Airway  Mallampati: II  TM Distance: 4 - 6 cm  Neck ROM: normal range of motion   Mouth opening: Normal     Cardiovascular  Regular rate and rhythm,  S1 and S2 normal,  no murmur, click, rub, or gallop             Dental  No notable dental hx       Pulmonary  Breath sounds clear to auscultation               Abdominal  GI exam deferred       Other Findings            Anesthetic Plan    ASA: 2  Anesthesia type: general          Induction: Intravenous  Anesthetic plan and risks discussed with: Patient                Physical Exam    Airway  Mallampati: II  TM Distance: 4 - 6 cm  Neck ROM: normal range of motion   Mouth opening: Normal     Cardiovascular  Regular rate and rhythm,  S1 and S2 normal,  no murmur, click, rub, or gallop             Dental  No notable dental hx       Pulmonary  Breath sounds clear to auscultation               Abdominal  GI exam deferred       Other Findings            Anesthetic Plan    ASA: 2  Anesthesia type: general    Monitoring Plan: BIS      Induction: Intravenous  Anesthetic plan and risks discussed with: Patient

## 2018-08-06 NOTE — IP AVS SNAPSHOT
[address] [facility] 
[phone] Patient: Anali Deleon MRN: [id number] BJJ:[id number] About your hospitalization You were admitted on:  August 6, 2018 You last received care in the:  [facility] 3 ORTHOPEDICS You were discharged on:  August 8, 2018 Why you were hospitalized Your primary diagnosis was:  Not on File Your diagnoses also included:  Hnp (Herniated Nucleus Pulposus), Lumbar Follow-up Information Follow up With Details Comments Contact Info Anthony Baron MD In 2 weeks  [address] [facility] 
[phone] Kary Jade MD   [address] [facility] 
[phone] 8 [facility]  This is your home health provider  [address] 
1st Floor [facility] [address] 
[phone] Discharge Orders None A check emani indicates which time of day the medication should be taken. My Medications START taking these medications Instructions Each Dose to Equal  
 Morning Noon Evening Bedtime  
 naloxone 4 mg/actuation nasal spray Commonly known as:  [employer] Your last dose was: Your next dose is:    
   
   
 1 [place] by IntraNASal route as needed (respiratory depression). Give single spray into one nostril. Call 911. Give additional doses every 2 to 3 minutes alternating nostrils until assistance arrives using a new nasal spray with each dose, if patient does not respond or responds and then relapses. 1 Spray  
    
   
   
   
  
 oxyCODONE IR 5 mg immediate release tablet Commonly known as:  Suyln Records Your last dose was: Your next dose is: Take 1-2 Tabs by mouth every four (4) hours as needed. Max Daily Amount: 60 mg.  
 5-10 mg  
    
   
   
   
  
 polyethylene glycol 17 gram packet Commonly known as:  Brittany Fisher Your last dose was: Your next dose is: Take 1 Packet by mouth daily as needed (constipation) for up to 15 days. 17 g  
    
   
   
   
  
 senna-docusate 8.6-50 mg per tablet Commonly known as:  Marisel Gonzalez Your last dose was: Your next dose is: Take 1 Tab by mouth daily. 1 Tab CHANGE how you take these medications Instructions Each Dose to Equal  
 Morning Noon Evening Bedtime ALPRAZolam 0.5 mg tablet Commonly known as:  Sahra Olivarez What changed:   
- how much to take 
- how to take this - when to take this 
- additional instructions Your last dose was: Your next dose is: TAKE 1 TABLET BY MOUTH 3 TIMES A DAY AS NEEDED CONTINUE taking these medications Instructions Each Dose to Equal  
 Morning Noon Evening Bedtime  
 amLODIPine 5 mg tablet Commonly known as:  Barby Fernie Your last dose was: Your next dose is: TAKE 1 TABLET BY MOUTH DAILY  
     
   
   
   
  
 gabapentin 300 mg capsule Commonly known as:  NEURONTIN Your last dose was: Your next dose is: Take 300 mg by mouth three (3) times daily. 300 mg PRESERVISION AREDS 2 PO Your last dose was: Your next dose is: Take 1 Tab by mouth daily. 1 Tab  
    
   
   
   
  
 tiZANidine 4 mg tablet Commonly known as:  Deborah Varinder Your last dose was: Your next dose is: TAKE 1 TABLET BY MOUTH AT BEDTIME  
     
   
   
   
  
 TYLENOL 325 mg tablet Generic drug:  acetaminophen Your last dose was: Your next dose is: Take 650 mg by mouth every four (4) hours as needed for Pain. 650 mg Where to Get Your Medications Information on where to get these meds will be given to you by the nurse or doctor. ! Ask your nurse or doctor about these medications  
  naloxone 4 mg/actuation nasal spray  
 oxyCODONE IR 5 mg immediate release tablet  
 polyethylene glycol 17 gram packet  
 senna-docusate 8.6-50 mg per tablet Opioid Education Prescription Opioids: What You Need to Know: 
 
 
Discharge Instruction Sheet: [facility] 
 
DR. Riog Gordillo Pain control: 
 Typically, we will prescribe a narcotic usually 1-2 tabs every four hours is  
 sufficient for the pain. Most patients need this only for the first few weeks. You 
 should discontinue this as the pain decreases. You should not drive while taking any narcotic pain medications. Constipation Pain medicines and anesthesia can be constipating-this can be prevented by gentle physical activity and drinking plenty of fluid. It should be treated with over-the-counter medications such as Miralax or suppositories, and/or Fleets enema. You should have a bowel movement at least every other day following surgery. Incision care Keep this area clean and dry. Your dressing is designed to stay in place for 5-7 days. You will be sent home with one additional dressing to change at that time. Leave this new dressing in place until our follow up visit in the office in about 10-14 days. If staples are in place, they should be removed about 14-20 days after surgery. You may shower with this impermeable dressing in place. DO NOT take a tub bath or go swimming until cleared by your doctor. DO NOT apply lotions, oils, or creams to incision. To increase and promote healing: 
? Stop Smoking (or at least cut back on smoking). ? Eat a well-balanced diet (high in protein and vitamin C) ? If your appetite is poor, consider nutritional supplements like Ensure, Glucerna, or [place] Instant Breakfast. 
? If you are diabetic, controlling you blood sugars is very important to prevent infection and promote wound healing. Nutrition: ? If you were on a supplement such as Ensure or Glucerna) while in the hospital, please continue using them with each meal for the next 30 days. ? Eat a well-balanced diet - High in protein, high in vitamins and minerals, especially vitamin C and zinc.  
 
Restrictions: 
 Remember your \"BLT's\" 1. Limited bending at waist 
  2. Lift no more than 10 pounds 3. No Twisting If you were given a brace, wear it when out of bed. Warning signs : Please call your physician immediately at [phone] if you have ? Bleeding from incision that is constant. ? Change in mental status (unusual behavior or confusion) ? If your incision develops redness or swelling 
? Change in wound drainage (increase in amount, color, or foul odor) ? [place] over 101.5 degrees Fahrenheit  
? Headache that is not relieved with pain medication ? Tenderness or redness in the calf of your leg Emergency: CALL 911 if you have ? Shortness of breath ? Chest pain ? Localized chest pain when coughing or taking a deep breath Follow-up Please call Dr. Vinny Fitzgerald office for a follow up appointment in 2 weeks at [phone]. You can return to work when cleared by a physician. During normal business hours you may reach Dr. Seema Snow' team directly at [phone] if you have concerns or questions. Juan Mercedes 
 
 
 
[employer] Transitions of Care Introducing [employer] [address] Lorraine Loja offers a voluntary care coordination program to provide high quality service and care to [facility] fee-for-service beneficiaries.   
 
Bob Gatic[employer] was designed to help you enhance your health and well-being through the following services: ? Transitions of Care  support for individuals who are transitioning from one care setting to another (example: Hospital to home). ? Chronic and Complex Care Coordination  support for individuals and caregivers of those with serious or chronic illnesses or with more than one chronic (ongoing) condition and those who take a number of different medications. If you meet specific medical criteria, a [facility] [address] may call you directly to coordinate your care with your primary care physician and your other care providers. For questions about the [facility] programs, please, contact your physicians office. For general questions or additional information about Accountable Care Organizations: 
Please visit www.medicare.gov/acos. html or call 1-800-MEDICARE (0-767.956.5560) TTY users should call 1-908.941.3516. [employer] Announcement We are excited to announce that we are making your provider's discharge notes available to you in [employer]. You will see these notes when they are completed and signed by the physician that discharged you from your recent hospital stay. If you have any questions or concerns about any information you see in [employer], please call the Health Information Department where you were seen or reach out to your Primary Care Provider for more information about your plan of care. Introducing [facility]! [employer] introduces [employer] patient portal. Now you can access parts of your medical record, email your doctor's office, and request medication refills online. 1. In your internet browser, go to [email or web address] 2. Click on the First Time User? Click Here link in the Sign In box. You will see the New Member Sign Up page. 3. Enter your [employer] Access Code exactly as it appears below. You will not need to use this code after youve completed the sign-up process.  If you do not sign up before the expiration date, you must request a new code. · [employer] Access Code: [email or web address] Expires: 8/15/2018  8:56 AM 
 
4. Enter the last four digits of your Social Security Number (xxxx) and Date of Birth (mm/dd/yyyy) as indicated and click Submit. You will be taken to the next sign-up page. 5. Create a [employer] ID. This will be your [employer] login ID and cannot be changed, so think of one that is secure and easy to remember. 6. Create a [employer] password. You can change your password at any time. 7. Enter your Password Reset Question and Answer. This can be used at a later time if you forget your password. 8. Enter your e-mail address. You will receive e-mail notification when new information is available in [address] 9. Click Sign Up. You can now view and download portions of your medical record. 10. Click the Download Summary menu link to download a portable copy of your medical information. If you have questions, please visit the Frequently Asked Questions section of the [employer] website. Remember, [employer] is NOT to be used for urgent needs. For medical emergencies, dial 911. Now available from your iPhone and Android! Introducing [employer] As a [employer] patient, I wanted to make you aware of our electronic visit tool called Mess[employer]ros. [employer] 24/7 allows you to connect within minutes with a medical provider 24 hours a day, seven days a week via a mobile device or tablet or logging into a secure website from your computer. You can access Messi Quiros from anywhere in the [place].  
 
A virtual visit might be right for you when you have a simple condition and feel like you just dont want to get out of bed, or cant get away from work for an appointment, when your regular [employer] provider is not available (evenings, weekends or holidays), or when youre out of town and need minor care. Electronic visits cost only $49 and if the [employer] provider determines a prescription is needed to treat your condition, one can be electronically transmitted to a nearby pharmacy*. Please take a moment to enroll today if you have not already done so. The enrollment process is free and takes just a few minutes. To enroll, please download the [employer] jean marie to your tablet or phone, or visit [email or web address] to enroll on your computer. And, as an [address] [place] [address] patient with a [employer] account, the results of your visits will be scanned into your electronic medical record and your primary care provider will be able to view the scanned results. We urge you to continue to see your regular [facility] provider for your ongoing medical care. And while your primary care provider may not be the one available when you seek a [employer] virtual visit, the peace of mind you get from getting a real diagnosis real time can be priceless. For more information on [employer], view our Frequently Asked Questions (FAQs) at [email or web address]. Sincerely, 
 
Clare Mcdonald MD 
Chief Medical Officer [facility] Lorraine Loja *:  certain medications cannot be prescribed via App[employer] Providers Seen During Your Hospitalization Provider Specialty Primary office phone Jessie Monge MD Orthopedic Surgery [phone] Your Primary Care Physician (PCP) Primary Care Physician Office Phone Office Fax Andressa Gramajo  You are allergic to the following No active allergies Recent Documentation Weight BMI Smoking Status 81.4 kg 26.5 kg/m2 Never Smoker Emergency Contacts Name Discharge Info Relation Home Work Mobile [address] [facility] CAREGIVER [3] Spouse [3] [phone] YOLETTE,Jeanne DISCHARGE CAREGIVER [3] Spouse [3] [phone] Patient Belongings The following personal items are in your possession at time of discharge: 
  Dental Appliances: None  Visual Aid: None      Home Medications: None   Jewelry: None  Clothing:  (clothing and shoes)    Other Valuables: Other (comment) (hearing aides)  Personal Items Sent to Safe: declined Please provide this summary of care documentation to your next provider. Signatures-by signing, you are acknowledging that this After Visit Summary has been reviewed with you and you have received a copy. Patient Signature:  ____________________________________________________________ Date:  ____________________________________________________________  
  
Rafal Lottie Provider Signature:  ____________________________________________________________ Date:  ____________________________________________________________

## 2018-08-06 NOTE — BRIEF OP NOTE
BRIEF OPERATIVE NOTE    Date of Procedure: 8/6/2018   Preoperative Diagnosis: LUMBAGO, H&P, STEOSIS, POST LAMINECTOMY  Postoperative Diagnosis: LUMBAGO, H&P, STEOSIS, POST LAMINECTOMY    Procedure(s):  REVISION L5-S1 DECOMPRESSION WITH FUSION  Surgeon(s) and Role:     * Mane Edwards MD - Primary         Surgical Assistant: Karime Sterling    Surgical Staff:  Circ-1: Ange Bowman RN  Physician Assistant: KRYSTYNA Haddad  Scrub Tech-1: Lisha Beth  Event Time In   Incision Start 1759   Incision Close      Anesthesia: General   Estimated Blood Loss: 150cc  Specimens: * No specimens in log *   Findings: HNP   Complications: none  Implants:   Implant Name Type Inv.  Item Serial No.  Lot No. LRB No. Used Action   GRAFT SPNG DMB CANC [id number] -- NEVOS - [id number]  GRAFT SPNG DMB CANC [id number] -- NEVOS [id number] [employer] Gear N/A N/A 1 Implanted   GRAFT BNE TYESHA FIBERS 30CC -- 3DEMIN - [id number]  GRAFT BNE TYESHA FIBERS 30CC -- 3DEMIN [id number] N/A N/A 1 Implanted   SCR SPNE TONYA THRD 7X45MM TI -- MATRIX - SN/A  SCR SPNE TONYA THRD 7X45MM TI -- MATRIX N/A SYNTHES [place] N/A N/A 6 Implanted   SCR SPNE TONYA THRD 7X50MM TI -- MATRIX - SN/A  SCR SPNE TONYA THRD 7X50MM TI -- MATRIX N/A SYNTHES [place] N/A N/A 4 Implanted   SCR SPN HEAD THRD POLY 5.5MM -- MATRIX - SN/A  SCR SPN HEAD THRD POLY 5.5MM -- MATRIX N/A SYNTHES [place] N/A N/A 10 Implanted   CAP LCK W/O SADL -- MATRIX - SN/A  CAP LCK W/O SADL -- MATRIX N/A SYNTHES [place] N/A N/A 1 Implanted   SPACER SPNE 15D [id number] -- ALTERA - SN/A  SPACER SPNE 15D [id number] -- ALTERA N/A [email or web address] N/A N/A 1 Implanted   120mm MIS JOSE   N/A SYNTHES SPINAL N/A N/A 1 Implanted   JOSE SPNE [id number] RAD/100MM -- TI MATRIX MIS - SN/A   JOSE SPNE [id number] RAD/100MM -- TI MATRIX MIS N/A [employer] [place] N/A N/A 1 Implanted

## 2018-08-07 ENCOUNTER — APPOINTMENT (OUTPATIENT)
Dept: GENERAL RADIOLOGY | Age: 83
DRG: 455 | End: 2018-08-07
Attending: ORTHOPAEDIC SURGERY
Payer: MEDICARE

## 2018-08-07 LAB — HGB BLD-MCNC: 12.5 G/DL (ref 12.1–17)

## 2018-08-07 PROCEDURE — 94760 N-INVAS EAR/PLS OXIMETRY 1: CPT

## 2018-08-07 PROCEDURE — 36415 COLL VENOUS BLD VENIPUNCTURE: CPT | Performed by: ORTHOPAEDIC SURGERY

## 2018-08-07 PROCEDURE — 97535 SELF CARE MNGMENT TRAINING: CPT | Performed by: OCCUPATIONAL THERAPIST

## 2018-08-07 PROCEDURE — 85018 HEMOGLOBIN: CPT | Performed by: ORTHOPAEDIC SURGERY

## 2018-08-07 PROCEDURE — 74011250636 HC RX REV CODE- 250/636: Performed by: ORTHOPAEDIC SURGERY

## 2018-08-07 PROCEDURE — 97116 GAIT TRAINING THERAPY: CPT | Performed by: PHYSICAL THERAPIST

## 2018-08-07 PROCEDURE — 74011250637 HC RX REV CODE- 250/637: Performed by: NURSE PRACTITIONER

## 2018-08-07 PROCEDURE — 97165 OT EVAL LOW COMPLEX 30 MIN: CPT | Performed by: OCCUPATIONAL THERAPIST

## 2018-08-07 PROCEDURE — 74011250637 HC RX REV CODE- 250/637: Performed by: ORTHOPAEDIC SURGERY

## 2018-08-07 PROCEDURE — L0627 LO SAG RI AN/POS PNL PRE CST: HCPCS

## 2018-08-07 PROCEDURE — 97161 PT EVAL LOW COMPLEX 20 MIN: CPT | Performed by: PHYSICAL THERAPIST

## 2018-08-07 PROCEDURE — G8988 SELF CARE GOAL STATUS: HCPCS | Performed by: OCCUPATIONAL THERAPIST

## 2018-08-07 PROCEDURE — 72100 X-RAY EXAM L-S SPINE 2/3 VWS: CPT

## 2018-08-07 PROCEDURE — 65270000029 HC RM PRIVATE

## 2018-08-07 PROCEDURE — G8987 SELF CARE CURRENT STATUS: HCPCS | Performed by: OCCUPATIONAL THERAPIST

## 2018-08-07 RX ORDER — GABAPENTIN 300 MG/1
300 CAPSULE ORAL 3 TIMES DAILY
Status: DISCONTINUED | OUTPATIENT
Start: 2018-08-07 | End: 2018-08-08 | Stop reason: HOSPADM

## 2018-08-07 RX ORDER — AMOXICILLIN 250 MG
1 CAPSULE ORAL 2 TIMES DAILY
Status: DISCONTINUED | OUTPATIENT
Start: 2018-08-07 | End: 2018-08-08 | Stop reason: HOSPADM

## 2018-08-07 RX ORDER — FACIAL-BODY WIPES
10 EACH TOPICAL DAILY PRN
Status: DISCONTINUED | OUTPATIENT
Start: 2018-08-08 | End: 2018-08-08 | Stop reason: HOSPADM

## 2018-08-07 RX ORDER — FAMOTIDINE 20 MG/1
20 TABLET, FILM COATED ORAL 2 TIMES DAILY
Status: DISCONTINUED | OUTPATIENT
Start: 2018-08-07 | End: 2018-08-08 | Stop reason: HOSPADM

## 2018-08-07 RX ORDER — TAMSULOSIN HYDROCHLORIDE 0.4 MG/1
0.4 CAPSULE ORAL DAILY
Status: DISCONTINUED | OUTPATIENT
Start: 2018-08-07 | End: 2018-08-08 | Stop reason: HOSPADM

## 2018-08-07 RX ORDER — SODIUM CHLORIDE 0.9 % (FLUSH) 0.9 %
5-10 SYRINGE (ML) INJECTION EVERY 8 HOURS
Status: DISCONTINUED | OUTPATIENT
Start: 2018-08-07 | End: 2018-08-08 | Stop reason: HOSPADM

## 2018-08-07 RX ORDER — ONDANSETRON 2 MG/ML
4 INJECTION INTRAMUSCULAR; INTRAVENOUS
Status: DISPENSED | OUTPATIENT
Start: 2018-08-07 | End: 2018-08-08

## 2018-08-07 RX ORDER — AMOXICILLIN 250 MG
1 CAPSULE ORAL DAILY
Qty: 30 TAB | Refills: 0 | Status: SHIPPED | OUTPATIENT
Start: 2018-08-07 | End: 2018-11-28

## 2018-08-07 RX ORDER — HYDROMORPHONE HYDROCHLORIDE 2 MG/ML
1 INJECTION, SOLUTION INTRAMUSCULAR; INTRAVENOUS; SUBCUTANEOUS
Status: ACTIVE | OUTPATIENT
Start: 2018-08-07 | End: 2018-08-08

## 2018-08-07 RX ORDER — CEFAZOLIN SODIUM/WATER 2 G/20 ML
2 SYRINGE (ML) INTRAVENOUS EVERY 8 HOURS
Status: COMPLETED | OUTPATIENT
Start: 2018-08-07 | End: 2018-08-07

## 2018-08-07 RX ORDER — DEXAMETHASONE SODIUM PHOSPHATE 4 MG/ML
10 INJECTION, SOLUTION INTRA-ARTICULAR; INTRALESIONAL; INTRAMUSCULAR; INTRAVENOUS; SOFT TISSUE ONCE
Status: COMPLETED | OUTPATIENT
Start: 2018-08-07 | End: 2018-08-07

## 2018-08-07 RX ORDER — ACETAMINOPHEN 500 MG
1000 TABLET ORAL EVERY 6 HOURS
Status: DISCONTINUED | OUTPATIENT
Start: 2018-08-07 | End: 2018-08-08 | Stop reason: HOSPADM

## 2018-08-07 RX ORDER — AMLODIPINE BESYLATE 5 MG/1
5 TABLET ORAL DAILY
Status: DISCONTINUED | OUTPATIENT
Start: 2018-08-07 | End: 2018-08-08 | Stop reason: HOSPADM

## 2018-08-07 RX ORDER — NALOXONE HYDROCHLORIDE 0.4 MG/ML
0.4 INJECTION, SOLUTION INTRAMUSCULAR; INTRAVENOUS; SUBCUTANEOUS AS NEEDED
Status: DISCONTINUED | OUTPATIENT
Start: 2018-08-07 | End: 2018-08-08 | Stop reason: HOSPADM

## 2018-08-07 RX ORDER — NALOXONE HYDROCHLORIDE 4 MG/.1ML
1 SPRAY NASAL AS NEEDED
Qty: 1 EACH | Refills: 0 | Status: SHIPPED | OUTPATIENT
Start: 2018-08-07 | End: 2020-12-23

## 2018-08-07 RX ORDER — POLYETHYLENE GLYCOL 3350 17 G/17G
17 POWDER, FOR SOLUTION ORAL DAILY
Status: DISCONTINUED | OUTPATIENT
Start: 2018-08-07 | End: 2018-08-08 | Stop reason: HOSPADM

## 2018-08-07 RX ORDER — CYCLOBENZAPRINE HCL 10 MG
10 TABLET ORAL
Status: DISCONTINUED | OUTPATIENT
Start: 2018-08-07 | End: 2018-08-08 | Stop reason: HOSPADM

## 2018-08-07 RX ORDER — HYDROXYZINE HYDROCHLORIDE 10 MG/1
10 TABLET, FILM COATED ORAL
Status: DISCONTINUED | OUTPATIENT
Start: 2018-08-07 | End: 2018-08-08 | Stop reason: HOSPADM

## 2018-08-07 RX ORDER — SODIUM CHLORIDE 0.9 % (FLUSH) 0.9 %
5-10 SYRINGE (ML) INJECTION AS NEEDED
Status: DISCONTINUED | OUTPATIENT
Start: 2018-08-07 | End: 2018-08-08 | Stop reason: HOSPADM

## 2018-08-07 RX ORDER — OXYCODONE HYDROCHLORIDE 5 MG/1
5-10 TABLET ORAL
Qty: 80 TAB | Refills: 0 | Status: SHIPPED | OUTPATIENT
Start: 2018-08-07 | End: 2018-11-28

## 2018-08-07 RX ORDER — POLYETHYLENE GLYCOL 3350 17 G/17G
17 POWDER, FOR SOLUTION ORAL
Qty: 15 PACKET | Refills: 0 | Status: SHIPPED | OUTPATIENT
Start: 2018-08-07 | End: 2018-08-22

## 2018-08-07 RX ADMIN — Medication 2 G: at 12:48

## 2018-08-07 RX ADMIN — GABAPENTIN 300 MG: 300 CAPSULE ORAL at 08:02

## 2018-08-07 RX ADMIN — POLYETHYLENE GLYCOL 3350 17 G: 17 POWDER, FOR SOLUTION ORAL at 08:02

## 2018-08-07 RX ADMIN — ACETAMINOPHEN 1000 MG: 500 TABLET ORAL at 17:03

## 2018-08-07 RX ADMIN — CYCLOBENZAPRINE HYDROCHLORIDE 10 MG: 10 TABLET, FILM COATED ORAL at 21:03

## 2018-08-07 RX ADMIN — GABAPENTIN 300 MG: 300 CAPSULE ORAL at 15:53

## 2018-08-07 RX ADMIN — Medication 10 ML: at 14:00

## 2018-08-07 RX ADMIN — SENNOSIDES AND DOCUSATE SODIUM 1 TABLET: 8.6; 5 TABLET ORAL at 08:03

## 2018-08-07 RX ADMIN — ACETAMINOPHEN 1000 MG: 500 TABLET ORAL at 05:08

## 2018-08-07 RX ADMIN — FAMOTIDINE 20 MG: 20 TABLET, FILM COATED ORAL at 17:03

## 2018-08-07 RX ADMIN — FAMOTIDINE 20 MG: 20 TABLET, FILM COATED ORAL at 08:02

## 2018-08-07 RX ADMIN — GABAPENTIN 300 MG: 300 CAPSULE ORAL at 21:03

## 2018-08-07 RX ADMIN — Medication 2 G: at 05:08

## 2018-08-07 RX ADMIN — AMLODIPINE BESYLATE 5 MG: 5 TABLET ORAL at 08:03

## 2018-08-07 RX ADMIN — TAMSULOSIN HYDROCHLORIDE 0.4 MG: 0.4 CAPSULE ORAL at 12:48

## 2018-08-07 RX ADMIN — MULTIPLE VITAMINS W/ MINERALS TAB 1 TABLET: TAB at 08:03

## 2018-08-07 RX ADMIN — DEXAMETHASONE SODIUM PHOSPHATE 10 MG: 4 INJECTION, SOLUTION INTRAMUSCULAR; INTRAVENOUS at 17:43

## 2018-08-07 RX ADMIN — Medication 10 ML: at 21:04

## 2018-08-07 RX ADMIN — Medication 10 ML: at 05:09

## 2018-08-07 RX ADMIN — ONDANSETRON 4 MG: 2 INJECTION, SOLUTION INTRAMUSCULAR; INTRAVENOUS at 10:04

## 2018-08-07 RX ADMIN — ALPRAZOLAM 0.5 MG: 0.5 TABLET ORAL at 21:03

## 2018-08-07 RX ADMIN — SENNOSIDES AND DOCUSATE SODIUM 1 TABLET: 8.6; 5 TABLET ORAL at 17:03

## 2018-08-07 RX ADMIN — ACETAMINOPHEN 1000 MG: 500 TABLET ORAL at 12:47

## 2018-08-07 RX ADMIN — SODIUM CHLORIDE 125 ML/HR: 900 INJECTION, SOLUTION INTRAVENOUS at 12:47

## 2018-08-07 RX ADMIN — OXYCODONE HYDROCHLORIDE 5 MG: 5 TABLET ORAL at 20:34

## 2018-08-07 NOTE — PROGRESS NOTES
Occupational Therapy Goals  Initiated 8/7/2018  1. Patient will perform grooming standing at sink with supervision/set-up within 7 day(s). 2.  Patient will perform sponge bathing with supervision/set-up using AE PRN within 7 day(s). 3.  Patient will perform lower body dressing with supervision/set-up using AE PRN within 7 day(s). 4.  Patient will perform toilet transfers with supervision/set-up within 7 day(s). 5.  Patient will perform all aspects of toileting with supervision/set-up within 7 day(s). Occupational Therapy EVALUATION  Patient: Hue Camacho (86 y.o. male)  Date: 8/7/2018  Primary Diagnosis: LUMBAGO, H&P, STEOSIS, POST LAMINECTOMY  HNP (herniated nucleus pulposus), lumbar  Procedure(s) (LRB):  REVISION L5-S1 DECOMPRESSION WITH FUSION (N/A) 1 Day Post-Op   Precautions: Falls  Back    ASSESSMENT :  Based on the objective data described below, the patient presents with s/p L5-S1 decompression and fusion, now with post op spine precautions, GW, decreased activity tolerance and decreased balance which is impairing his functional independence. He is functioning below his independent to mod I baseline, now performing ADLs at an independent to mod A level and is independent to min A for functional mobility. Patient will benefit from skilled intervention to address the above impairments and may need inpatient rehab if he is unable to climb steps.   Patients rehabilitation potential is considered to be Good  Factors which may influence rehabilitation potential include:   []             None noted  []             Mental ability/status  []             Medical condition  [x]             Home/family situation and support systems  []             Safety awareness  []             Pain tolerance/management  []             Other:      PLAN :  Recommendations and Planned Interventions:  [x]               Self Care Training                  []        Therapeutic Activities  [x]               Functional Mobility Training    []        Cognitive Retraining  []               Therapeutic Exercises           [x]        Endurance Activities  [x]               Balance Training                   []        Neuromuscular Re-Education  []               Visual/Perceptual Training     [x]   Home Safety Training  [x]               Patient Education                 [x]        Family Training/Education  []               Other (comment):    Frequency/Duration: Patient will be followed by occupational therapy 5 times a week to address goals. Discharge Recommendations: Inpatient Rehab VS HHOT and PT with 24 hour supervision, pending patient's ability to climb steps.    Further Equipment Recommendations for Discharge: TBD     OBJECTIVE DATA SUMMARY:     Past Medical History:   Diagnosis Date    Allergic rhinitis 12/15/2017    Arthritis of knee, left 12/15/2017    Aseptic meningitis     Chronic pain     chronic back pain    Colon polyps     Constipation 12/15/2017    Dysphagia 12/15/2017    Elevated blood pressure reading 12/15/2017    Fatigue 12/15/2017    Headache 12/15/2017    Hematuria 12/15/2017    Herpes zoster dermatitis 12/15/2017    Hyperkalemia 12/15/2017    Hypertension 12/15/2017    Insomnia 12/15/2017    Osteoarthritis 12/15/2017    Otitis externa 12/15/2017    Prostate cancer screening 12/15/2017    Right groin hernia     Sciatica 12/15/2017    Sciatica of left side associated with disorder of lumbosacral spine 12/15/2017    Shingles     Spinal stenosis 12/15/2017     Past Surgical History:   Procedure Laterality Date    COLONOSCOPY,DIAGNOSTIC  11/11/2014         HX CATARACT REMOVAL      with lens implants    HX HERNIA REPAIR Left     HX ORTHOPAEDIC  2008    rods placed in back    HX ORTHOPAEDIC  10/6/15    LEFT L5-S1 MICRODISCECTOMY     HX OTHER SURGICAL      steroid injection for back pain    RI EGD INSERT GUIDE WIRE DILATOR PASSAGE ESOPHAGUS  10/17/2013         RI EGD TRANSORAL BIOPSY SINGLE/MULTIPLE  10/17/2013          Prior Level of Function/Home Situation: Independent to mod I for ADL using AE for LB dressing/bahting, ambulates independently, is independent with IADLs and still drives. Patient recently walking with a RW due to increased back pain  Expanded or extensive additional review of patient history:     Home Situation  Home Environment: Private residence  One/Two Story Residence: Two story  Living Alone: No  Support Systems: Spouse/Significant Other/Partner  Patient Expects to be Discharged to[de-identified] Private residence  Current DME Used/Available at Home: [address], straight  Tub or Shower Type: Tub/Shower combination  [x]  Right hand dominant   []  Left hand dominant  Cognitive/Behavioral Status:  Neurologic State: Alert  Orientation Level: Oriented X4  Cognition: Appropriate decision making; Appropriate for age attention/concentration; Appropriate safety awareness; Follows commands  Perception: Appears intact  Perseveration: No perseveration noted  Safety/Judgement: Awareness of environment; Insight into deficits    Vision/Perceptual:     Acuity: Within Defined Limits    Corrective Lenses: Glasses  Range of Motion:  AROM: Generally decreased, functional (BLEs, WFL BUEs)  PROM: Within functional limits (back precautions)  Strength:  Strength: Generally decreased, functional  Coordination:  Coordination: Within functional limits  Fine Motor Skills-Upper: Left Intact; Right Intact    Gross Motor Skills-Upper: Left Intact; Right Intact  Tone & Sensation:  Tone: Normal  Balance:  Sitting: Intact  Standing: Impaired  Standing - Static: Fair  Standing - Dynamic : Fair  Functional Mobility and Transfers for ADLs:  Bed Mobility:  Rolling: Supervision  Supine to Sit: Supervision  Sit to Supine: Independent  Scooting: Independent  Transfers:  Sit to Stand: Contact guard assistance     Bed to Chair: Minimum assistance (ambulating without an AD)          Toilet Transfer : Contact guard assistance ADL Assessment:  Feeding: Independent    Oral Facial Hygiene/Grooming: Contact guard assistance    Bathing: Moderate assistance    Upper Body Dressing: Supervision;Setup    Lower Body Dressing: Moderate assistance    Toileting: Minimum assistance                Functional Measure:  Barthel Index:    Bathin  Bladder: 5  Bowels: 10  Groomin  Dressin  Feeding: 10  Mobility: 10  Stairs: 0  Toilet Use: 5  Transfer (Bed to Chair and Back): 10  Total: 55       Barthel and G-code impairment scale:  Percentage of impairment CH  0% CI  1-19% CJ  20-39% CK  40-59% CL  60-79% CM  80-99% CN  100%   Barthel Score 0-100 100 99-80 79-60 59-40 20-39 1-19   0   Barthel Score 0-20 20 17-19 13-16 9-12 5-8 1-4 0      The Barthel ADL Index: Guidelines  1. The index should be used as a record of what a patient does, not as a record of what a patient could do. 2. The main aim is to establish degree of independence from any help, physical or verbal, however minor and for whatever reason. 3. The need for supervision renders the patient not independent. 4. A patient's performance should be established using the best available evidence. Asking the patient, friends/relatives and nurses are the usual sources, but direct observation and common sense are also important. However direct testing is not needed. 5. Usually the patient's performance over the preceding 24-48 hours is important, but occasionally longer periods will be relevant. 6. Middle categories imply that the patient supplies over 50 per cent of the effort. 7. Use of aids to be independent is allowed. Tayler Moise., Barthel, D.W. (6667). Functional evaluation: the Barthel Index. [facility] (14)2. PAULINE Bravo Ace, Omar Johnson., Paula Bird., [place], [address] [place] [address] (). Measuring the change indisability after inpatient rehabilitation; comparison of the responsiveness of the Barthel Index and Functional [place] Measure.  Journal of Neurology, Neurosurgery, and Psychiatry, 66(1), 571-995. CALEB Robin.YOLETTE, MATEO Harper, & Neema Jameson M.A. (2004.) Assessment of post-stroke quality of life in cost-effectiveness studies: The usefulness of the Barthel Index and the EuroQoL-5D. Quality of Life Research, 13, 427-43       In compliance with CMSs Claims Based Outcome Reporting, the following G-code set was chosen for this patient based on their primary functional limitation being treated: The outcome measure chosen to determine the severity of the functional limitation was the Barthel Index with a score of 55/100 which was correlated with the impairment scale. ? Self Care:     - CURRENT STATUS: CK - 40%-59% impaired, limited or restricted    - GOAL STATUS:  CJ - 20%-39% impaired, limited or restricted    - D/C STATUS:  ---------------To be determined---------------       ADL Intervention and task modifications:  Initiated bed mobility, transfer, standing grooming, toileting and safety training. Issued aspen LSO and instructed patient in proper donning. Patient performed with supervision. Pain:  Pain Scale 1: Numeric (0 - 10)  Pain Intensity 1: 1  Pain Location 1: Back  Pain Orientation 1: Lower  Pain Description 1: Aching  Pain Intervention(s) 1: Medication (see MAR)  Activity Tolerance:   VSS, Fair overall for functional activity    After treatment:   [x] Patient left in no apparent distress sitting up in chair  [] Patient left in no apparent distress in bed  [x] Call bell left within reach  [x] Nursing notified  [] Caregiver present  [] Bed alarm activated    COMMUNICATION/EDUCATION:   The patients plan of care was discussed with: Physical Therapist.  [x] Home safety education was provided and the patient/caregiver indicated understanding. [x] Patient/family have participated as able in goal setting and plan of care. [x] Patient/family agree to work toward stated goals and plan of care.   [] Patient understands intent and goals of therapy, but is neutral about his/her participation. [] Patient is unable to participate in goal setting and plan of care. This patients plan of care is appropriate for delegation to [facility].     Thank you for this referral.  Inder Vincent, OTR/L  Time Calculation: 30 mins

## 2018-08-07 NOTE — ANESTHESIA POSTPROCEDURE EVALUATION
Post-Anesthesia Evaluation and Assessment    Patient: Jennifer Nguyen MRN: [id number]  SSN: [id number]    YOB: 1935  Age: 80 y.o. Sex: male       Cardiovascular Function/Vital Signs  Visit Vitals    /54 (BP 1 Location: Left arm, BP Patient Position: At rest)    Pulse 73    Temp 36.8 °C (98.2 °F)    Resp 20    Wt 81.4 kg (179 lb 7.3 oz)    SpO2 100%    BMI 26.5 kg/m2       Patient is status post general anesthesia for Procedure(s):  REVISION L5-S1 DECOMPRESSION WITH FUSION. Nausea/Vomiting: None    Postoperative hydration reviewed and adequate. Pain:  Pain Scale 1: Numeric (0 - 10) (08/06/18 2130)  Pain Intensity 1: 3 (08/06/18 2130)   Managed    Neurological Status:   Neuro (WDL): Exceptions to WDL (08/06/18 2012)  Neuro  LUE Motor Response: Purposeful;Spontaneous  (08/06/18 2012)  LLE Motor Response: Other(comment) (painful) (08/06/18 2012)  RUE Motor Response: Purposeful;Spontaneous  (08/06/18 2012)  RLE Motor Response: Weak (08/06/18 2012)   At baseline    Mental Status and Level of Consciousness: Arousable    Pulmonary Status:   O2 Device: Nasal cannula (08/06/18 2130)   Adequate oxygenation and airway patent    Complications related to anesthesia: None    Post-anesthesia assessment completed.  No concerns    Signed By: Carly Duarte MD     August 6, 2018

## 2018-08-07 NOTE — PROGRESS NOTES
Ortho / Neurosurgery NP Note    POD# 1  s/p REVISION L5-S1 DECOMPRESSION WITH FUSION   Pt seen with family at bedside  Discussed with PT who thinks he needs at l;east a PM session and may need to stay overnight to optimize PT    Pt resting in bed. No complaints. VSS Afebrile. Voiding status: has not voided yet. Labs  Lab Results   Component Value Date/Time    HGB 12.5 08/07/2018 03:12 AM      Lab Results   Component Value Date/Time    INR 1.0 08/03/2018 03:31 PM        Body mass index is 26.5 kg/(m^2). : A BMI > 30 is classified as obesity and > 40 is classified as morbid obesity. Dressing c.d.i  Cryotherapy in place over incision  Drain in place - emptying 70 cc every 3-4 hours. Calves soft and supple; No pain with passive stretch  Sensation and motor intact  SCDs for mechanical DVT proph while in bed     PLAN:  1) PT BID  2) POUR - straight cath this morning; needs to void; will start flomax  3) GI Prophylaxis - pepcid  4) Readniess for discharge:     [x] Vital Signs stable    [x] Hgb stable    [x] + Voiding    [x] Wound intact, drainage minimal    [x] Tolerating PO intake     [] Cleared by PT (OT if applicable)     [] Stair training completed (if applicable)    [] Independent / [facility] (household distance)     [] Bed mobility     [] Car transfers     [] ADLs    [x] Adequate pain control on oral medication alone      Needs to void and progress well with PT  Likely home tomorrow with wife.     Yuliana Hankins NP  DNP, ACNP-BC, ONP-C

## 2018-08-07 NOTE — PROGRESS NOTES
Bedside shift change report given to Eli Metzger RN (oncoming nurse) by Leda Dietrich RN (offgoing nurse).  Report included the following information SBAR

## 2018-08-07 NOTE — OP NOTES
[address]  OPERATIVE REPORT    Adonis Langley  MR#: [id number]  : 1935  ACCOUNT #: [de-identified]   DATE OF SERVICE: 2018    PREOPERATIVE DIAGNOSES:  1.  Status post lumbar fusion L2 through L5.  2.  Lumbar disk herniation at L5-S1 in the setting of a previous laminectomy. 3.  Right lower extremity sciatica. 4.  Lumbago. POSTOPERATIVE DIAGNOSES:  1.  Status post lumbar fusion L2 through L5.  2.  Lumbar disk herniation at L5-S1 in the setting of a previous laminectomy. 3.  Right lower extremity sciatica. 4.  Lumbago. PROCEDURES PERFORMED:  1. L5-S1 posterior fusion with a TLIF.  2.  L5-S1 insertion of interbody biomechanical device. 3.  L2 through S1 posterior instrumentation. 4.  Bone marrow aspirate from the left posterior superior iliac spine for spinal fusion augmentation. 5.  Use of allograft bone for spine fusion. 6.  Use of local autograft bone for spine fusion. SURGEON:  Tato Al MD    FIRST ASSISTANT:  Lea Acevedo PA-C    ESTIMATED BLOOD LOSS:  150 mL. COMPLICATIONS:  None. SPECIMENS REMOVED:  None. ANESTHESIA:  General.    DRAINS:  x1. IMPLANTS:  The [employer] Synthes Matrix pedicle screw system and the Glob[employer] Altera TLIF. INDICATIONS FOR PROCEDURE:  The patient is a very pleasant 44-year-old gentleman with spinal stenosis with claudication in the setting of a disk herniation and a previous lumbar fusion. He has failed to improve with nonoperative treatment, and at this point elected to proceed with surgical intervention. I have given him warnings about the possible complications including but not limited to pain, scar, bleeding, infection, nonunion, damage to surrounding structures, death, paralysis, blindness, stroke. He understands and wants to proceed. DESCRIPTION OF PROCEDURE:  Informed consent was obtained and the operative site was properly marked.   The patient was moved back to the operating room and under general endotracheal anesthesia. He was positioned prone on the operating table using the [employer] frame. His arms were placed in the 90/90 position, and the knees were gently bent with pillows. Fluoroscopy was used to emani the level of the incision. We then proceeded to prep and drape in the usual manner. Timeout was obtained verifying this was the correct patient, correct surgery, the correct site, as well as that he had received antibiotics within 30 minutes of the incision. I then proceeded to perform a standard posterior approach to the lumbar spine exposing the area between L2 and S1. Once the area was exposed and hemostasis was obtained, the previous hardware was identified. I was able then to remove the previous hardware and replace the screws with screws 1 diameter larger. In that process, we noticed that the screws on the right side at L3 and left side L4 had violation of the pedicle wall when they were removed. Also, the screw at L5 had a good purchase, but had poor alignment with the overall construct and was left empty. Once we finished with all the screw placement, I verified that the fusion had healed and then proceeded then to use a Jamshidi needle to aspirate 60 mL of bone marrow from the left posterior superior iliac spine. Once that was completed, I augmented the bone graft with bone. I proceeded then to find the L5-S1 area and on the S1 bilaterally, I placed pedicle screws with the following technique: With fluoroscopy in the AP view a drill for the pedicle was marked at the 3 o'clock or 9 o'clock position with an awl tap device. The awl tap was advanced anterior to medial of the pedicle and was then stimulated with electrocautery. No response noted. The awl tap was removed and the premeasured screw was inserted.   Once the screws were in place, I proceeded then to drill a small partial facetectomy above the S1 pedicle screw on the right, drilling between the 1 o'clock and 11 o'clock position until we found the disk space. Once the disk space was identified, the disk was instrumented initially with an opening wedge followed by blunt shaver, sharp rolan, straight and curved curettes, straight and curved pituitaries. Once that area was fully instrumented, I proceeded then to remove the disk debris with the pituitary, making sure that left behind  punctate bleeding bone. I then proceeded to place half of an Amendia Nevos sponge in the anterior half of the vertebral body soaked in bone marrow aspirate and then proceeded to get a TLIF cage inserted into the anterior half of the intervertebral space going across the midline turning 90 degrees and deploying to its final height, restoring the disk height. Once this was completed, I proceeded to perform a facetectomy and find the thecal sac and remove the ligamentum flavum. The nerve roots were identified for both L5 and S1 in the thecal sac centrally. Thecal sac was gently retracted and the disk herniation was removed. Once that area was fully decompressed, I proceeded to irrigate the wound with 3 liters of normal saline, decorticated the posterior elements bilaterally, placed in the local autograft bone mixed with the cortical bone fibers and bone marrow aspirate. Once that was satisfied, I proceeded to place the rods and locking caps and final-tightened the construct in place. Final x-rays were taken saved to PACS. The wound was then closed. The fascia was closed with #1 Stratafix running suture followed by placing superficial drain, closed subcutaneous with 2-0 Vicryl and the skin with staples. Sterile dressing was applied. The patient was then awakened and transferred to PACU in stable condition. POSTOPERATIVE PLAN:  The patient is going to remain here overnight. We are going to give him SCDs, ANDREW hose for DVT prophylaxis, and Ancef for infection prophylaxis.       MD HUE Waters / SANAM  D: 08/06/2018 19:45 T: 08/07/2018 09:47  JOB #: [id number]  CC: Beny Billing Office  CC: Jessa Sanders MD

## 2018-08-07 NOTE — PERIOP NOTES
Handoff Report from Operating Room to PACU    Report received from [facility]  and Arlen Dasilva CRNA  regarding Vero Tran. Surgeon(s):  Dory Mehta MD  And Procedure(s) (LRB):  REVISION L5-S1 DECOMPRESSION WITH FUSION (N/A)  confirmed   with drains and dressings discussed. Anesthesia type, drugs, patient history, complications, estimated blood loss, vital signs, intake and output, and last pain medication and reversal medications were reviewed.

## 2018-08-07 NOTE — PROGRESS NOTES
Problem: Mobility Impaired (Adult and Pediatric)  Goal: *Acute Goals and Plan of Care (Insert Text)  Physical Therapy Goals  Initiated 8/7/2018    1. Patient will perform sit to stand with independence within 4 days. 2. Patient will ambulate with independence for 200 feet with the least restrictive device within 4 days. 3. Patient will ascend/descend 12 stairs with 1 handrail(s) with independence within 4 days. 4. Patient will verbalize and demonstrate understanding of spinal precautions (No bending, lifting greater than 5 lbs, or twisting; log-roll technique; frequent repositioning as instructed) within 4 days. physical Therapy TREATMENT  Patient: Hugo Pompa (69 y.o. male)  Date: 8/7/2018  Diagnosis: LUMBAGO, H&P, STEOSIS, POST LAMINECTOMY  HNP (herniated nucleus pulposus), lumbar <principal problem not specified>  Procedure(s) (LRB):  REVISION L5-S1 DECOMPRESSION WITH FUSION (N/A) 1 Day Post-Op  Precautions: Back  Chart, physical therapy assessment, plan of care and goals were reviewed. ASSESSMENT:  Patient continues to make slow improvement. He is able to come to sitting EOB independently and puts on the quick draw brace with CGA. He ambulated 200 feet slowly with a RW. Returned to supine. His bedroom is upstairs and he anticipates practicing the steps in the morning. Not yet ready for disch this afternoon. Will need HH. He had been using a RW prior to surgery and we used one today but he may not require it tomorrow. He has one at home if needed. Progression toward goals:  [x]      Improving appropriately and progressing toward goals  []      Improving slowly and progressing toward goals  []      Not making progress toward goals and plan of care will be adjusted     PLAN:  Patient continues to benefit from skilled intervention to address the above impairments. Continue treatment per established plan of care.   Discharge Recommendations:  Home Health  Further Equipment Recommendations for Discharge:  none     SUBJECTIVE:   Patient stated I am hurting a little more then this morning.    The patient stated 0/3 back precautions. Reviewed all 3 with patient. OBJECTIVE DATA SUMMARY:   Critical Behavior:  Neurologic State: Alert  Orientation Level: Oriented X4  Cognition: Appropriate decision making, Appropriate for age attention/concentration, Appropriate safety awareness, Follows commands  Safety/Judgement: Awareness of environment, Insight into deficits  Functional Mobility Training:  Bed Mobility:  Log Rolling: Supervision  Supine to Sit: Supervision  Sit to Supine: Supervision  Scooting: Supervision        Brace donned with  minimal assistance/contact guard assist   Transfers:  Sit to Stand: Supervision  Stand to Sit: Supervision        Bed to Chair: Minimum assistance (ambulating without an AD)                    Balance:  Sitting: Intact  Standing: Intact; With support  Standing - Static: Constant support;Good  Standing - Dynamic : Good  Ambulation/Gait Training:  Distance (ft): 200 Feet (ft)  Assistive Device: Walker, rolling;Gait belt  Ambulation - Level of Assistance: Contact guard assistance     Gait Description (WDL): Exceptions to WDL  Gait Abnormalities: Decreased step clearance        Base of Support: Narrowed     Speed/Jemima: Pace decreased (<100 feet/min)  Step Length: Left shortened;Right shortened                        Pain:  Pain Scale 1: Numeric (0 - 10)  Pain Intensity 1: 1  Pain Location 1: Back  Pain Orientation 1: Lower  Pain Description 1: Aching  Pain Intervention(s) 1: Medication (see MAR); Declines  Activity Tolerance:   Good. Please refer to the flowsheet for vital signs taken during this treatment.   After treatment:   []  Patient left in no apparent distress sitting up in chair  [x]  Patient left in no apparent distress in bed  [x]  Call bell left within reach  [x]  Nursing notified  [x]  Caregiver present  []  Bed alarm activated    COMMUNICATION/COLLABORATION: The patients plan of care was discussed with: Registered Nurse and Rehabilitation Attendant    Caterina Saleh, PT   Time Calculation: 20 mins

## 2018-08-07 NOTE — PROGRESS NOTES
Bedside and Verbal shift change report given to Sada Constantino (oncoming nurse) by Otto Christine (offgoing nurse). Report included the following information SBAR, Kardex, MAR and Recent Results.

## 2018-08-07 NOTE — PERIOP NOTES
TRANSFER - OUT REPORT:    Verbal report given to [address] (name) on [employer]  being transferred to [facility](unit) for routine post - op       Report consisted of patients Situation, Background, Assessment and   Recommendations(SBAR). Information from the following report(s) SBAR, Kardex, OR Summary, Intake/Output, MAR and Recent Results was reviewed with the receiving nurse. Opportunity for questions and clarification was provided.       Patient transported with:   O2 @ 2 liters  Registered Nurse

## 2018-08-07 NOTE — PROGRESS NOTES
Problem: Mobility Impaired (Adult and Pediatric)  Goal: *Acute Goals and Plan of Care (Insert Text)  Physical Therapy Goals  Initiated 8/7/2018    1. Patient will perform sit to stand with independence within 4 days. 2. Patient will ambulate with independence for 200 feet with the least restrictive device within 4 days. 3. Patient will ascend/descend 12 stairs with 1 handrail(s) with independence within 4 days. 4. Patient will verbalize and demonstrate understanding of spinal precautions (No bending, lifting greater than 5 lbs, or twisting; log-roll technique; frequent repositioning as instructed) within 4 days. physical Therapy EVALUATION  Patient: Agnieszka Vazquez (41 y.o. male)  Date: 8/7/2018  Primary Diagnosis: LUMBAGO, H&P, STEOSIS, POST LAMINECTOMY  HNP (herniated nucleus pulposus), lumbar  Procedure(s) (LRB):  REVISION L5-S1 DECOMPRESSION WITH FUSION (N/A) 1 Day Post-Op   Precautions:   Back    ASSESSMENT :  Based on the objective data described below, the patient presents with impaired ambulation due to above surgery. Patient had been up in the chair and on arrival is back on the bed. He is independent with bed mobility. He has been using a RW at home due to the back pain so today he ambulated 200 feet with a RW with a slow steady gait. Returned to the bedside chair and instructed in ankle pumps and LAQ. Wife present. They are asking about rehab due to his bedroom being upstairs. Wife is asking questions about if he is able to do the steps and she is informed that he is able to do the steps and if he can he will be able to go home. Patient will benefit from skilled intervention to address the above impairments.   Patients rehabilitation potential is considered to be Good  Factors which may influence rehabilitation potential include:   [x]         None noted  []         Mental ability/status  []         Medical condition  []         Home/family situation and support systems  [] Safety awareness  []         Pain tolerance/management  []         Other:      PLAN :  Recommendations and Planned Interventions:  [x]           Bed Mobility Training             []    Neuromuscular Re-Education  [x]           Transfer Training                   []    Orthotic/Prosthetic Training  [x]           Gait Training                         []    Modalities  []           Therapeutic Exercises           []    Edema Management/Control  []           Therapeutic Activities            [x]    Patient and Family Training/Education  []           Other (comment):    Frequency/Duration: Patient will be followed by physical therapy  twice daily to address goals. Discharge Recommendations: Home Health  Further Equipment Recommendations for Discharge: none     SUBJECTIVE:   Patient stated I can get up and walk.     OBJECTIVE DATA SUMMARY:   HISTORY:    Past Medical History:   Diagnosis Date    Allergic rhinitis 12/15/2017    Arthritis of knee, left 12/15/2017    Aseptic meningitis     Chronic pain     chronic back pain    Colon polyps     Constipation 12/15/2017    Dysphagia 12/15/2017    Elevated blood pressure reading 12/15/2017    Fatigue 12/15/2017    Headache 12/15/2017    Hematuria 12/15/2017    Herpes zoster dermatitis 12/15/2017    Hyperkalemia 12/15/2017    Hypertension 12/15/2017    Insomnia 12/15/2017    Osteoarthritis 12/15/2017    Otitis externa 12/15/2017    Prostate cancer screening 12/15/2017    Right groin hernia     Sciatica 12/15/2017    Sciatica of left side associated with disorder of lumbosacral spine 12/15/2017    Shingles     Spinal stenosis 12/15/2017     Past Surgical History:   Procedure Laterality Date    COLONOSCOPY,DIAGNOSTIC  11/11/2014         HX CATARACT REMOVAL      with lens implants    HX HERNIA REPAIR Left     HX ORTHOPAEDIC  2008    rods placed in back    HX ORTHOPAEDIC  10/6/15    LEFT L5-S1 MICRODISCECTOMY     HX OTHER SURGICAL      steroid injection for back pain    PA EGD INSERT GUIDE WIRE DILATOR PASSAGE ESOPHAGUS  10/17/2013         PA EGD TRANSORAL BIOPSY SINGLE/MULTIPLE  10/17/2013          Prior Level of Function/Home Situation: has been using a RW due to the back pain. Personal factors and/or comorbidities impacting plan of care: back pain. Home Situation  Home Environment: Private residence  One/Two Story Residence: Two story  Living Alone: No  Support Systems: Spouse/Significant Other/Partner  Patient Expects to be Discharged to[de-identified] Private residence  Current DME Used/Available at Home: [address], straight    EXAMINATION/PRESENTATION/DECISION MAKING:   Critical Behavior:  Neurologic State: Alert  Orientation Level: Appropriate for age, Oriented X4  Cognition: Appropriate decision making, Follows commands  Safety/Judgement: Good awareness of safety precautions  Hearing: Auditory  Auditory Impairment: Hearing aid(s)  Skin:  Per nursing. Edema: per nursing. Range Of Motion:  AROM: Within functional limits (back precautions)           PROM: Within functional limits (back precautions)           Strength:    Strength: Within functional limits                    Tone & Sensation:   Tone: Normal              Sensation: Intact               Coordination:  Coordination: Within functional limits  Vision:      Functional Mobility:  Bed Mobility:  Rolling: Independent  Supine to Sit: Independent  Sit to Supine: Independent  Scooting: Independent  Transfers:  Sit to Stand: Supervision  Stand to Sit: Supervision                       Balance:   Sitting: Intact  Standing: Intact; With support  Ambulation/Gait Training:  Distance (ft): 200 Feet (ft)  Assistive Device: Walker, rolling;Gait belt  Ambulation - Level of Assistance: Contact guard assistance     Gait Description (WDL): Exceptions to WDL  Gait Abnormalities: Decreased step clearance        Base of Support: Narrowed     Speed/Jemima: Pace decreased (<100 feet/min)  Step Length: Right shortened;Left shortened      Slow steady gait with a RW. Therapeutic Exercises:   Instructed in ankle pumps and LAQ. Functional Measure:  Barthel Index:    Bathin  Bladder: 0  Bowels: 10  Groomin  Dressin  Feeding: 10  Mobility: 15  Stairs: 0  Toilet Use: 5  Transfer (Bed to Chair and Back): 10  Total: 60       Barthel and G-code impairment scale:  Percentage of impairment CH  0% CI  1-19% CJ  20-39% CK  40-59% CL  60-79% CM  80-99% CN  100%   Barthel Score 0-100 100 99-80 79-60 59-40 20-39 1-19   0   Barthel Score 0-20 20 17-19 13-16 9-12 5-8 1-4 0      The Barthel ADL Index: Guidelines  1. The index should be used as a record of what a patient does, not as a record of what a patient could do. 2. The main aim is to establish degree of independence from any help, physical or verbal, however minor and for whatever reason. 3. The need for supervision renders the patient not independent. 4. A patient's performance should be established using the best available evidence. Asking the patient, friends/relatives and nurses are the usual sources, but direct observation and common sense are also important. However direct testing is not needed. 5. Usually the patient's performance over the preceding 24-48 hours is important, but occasionally longer periods will be relevant. 6. Middle categories imply that the patient supplies over 50 per cent of the effort. 7. Use of aids to be independent is allowed. Shannan Gordillo., Barthel, D.W. ([id number]). Functional evaluation: the Barthel Index. [address] [facility] (14)2. [facility] nish PAULINE Pittman, Tootie Gilman., Maria M Seals., Arthur, [address] [place] [address] (). Measuring the change indisability after inpatient rehabilitation; comparison of the responsiveness of the Barthel Index and Functional Isabella Measure. Journal of Neurology, Neurosurgery, and Psychiatry, 66(4), 228-587.   ALEXANDER Mcmahon, MATEO Harper, Daniela Ruiz M.A. (2004.) Assessment of post-stroke quality of life in cost-effectiveness studies: The usefulness of the Barthel Index and the EuroQoL-5D. Quality of Life Research, 13, 247-56       G codes: In compliance with CMSs Claims Based Outcome Reporting, the following G-code set was chosen for this patient based on their primary functional limitation being treated: The outcome measure chosen to determine the severity of the functional limitation was the Barthel with a score of 60/100 which was correlated with the impairment scale. ? Mobility - Walking and Moving Around:     - CURRENT STATUS: CJ - 20%-39% impaired, limited or restricted    - GOAL STATUS: CI - 1%-19% impaired, limited or restricted    - D/C STATUS:  ---------------To be determined---------------      Pain:  Pain Scale 1: Numeric (0 - 10)  Pain Intensity 1: 1  Pain Location 1: Back  Pain Orientation 1: Lower  Pain Description 1: Aching  Pain Intervention(s) 1: Medication (see MAR)  Activity Tolerance:   Good. Please refer to the flowsheet for vital signs taken during this treatment. After treatment:   [x]         Patient left in no apparent distress sitting up in chair  []         Patient left in no apparent distress in bed  [x]         Call bell left within reach  [x]         Nursing notified  [x]         Caregiver present  []         Bed alarm activated    COMMUNICATION/EDUCATION:   The patients plan of care was discussed with: Occupational Therapist, Registered Nurse and Rehabilitation Attendant., NP. [x]         Fall prevention education was provided and the patient/caregiver indicated understanding. [x]         Patient/family have participated as able in goal setting and plan of care. [x]         Patient/family agree to work toward stated goals and plan of care. []         Patient understands intent and goals of therapy, but is neutral about his/her participation.   []         Patient is unable to participate in goal setting and plan of care.    Thank you for this referral.  Jose R Nunes, PT   Time Calculation: 20 mins

## 2018-08-07 NOTE — DISCHARGE INSTRUCTIONS
[address] [facility]    Discharge Instruction Sheet: POSTERIOR SPINAL FUSION    DR. Letty Burns    Pain control:   Typically, we will prescribe a narcotic usually 1-2 tabs every four hours is    sufficient for the pain. Most patients need this only for the first few weeks. You   should discontinue this as the pain decreases. You should not drive while taking any narcotic pain medications. Constipation  Pain medicines and anesthesia can be constipating-this can be prevented by gentle physical activity and drinking plenty of fluid. It should be treated with over-the-counter medications such as Miralax or suppositories, and/or Fleets enema. You should have a bowel movement at least every other day following surgery. Incision care     Keep this area clean and dry. Your dressing is designed to stay in place for 5-7 days. You will be sent home with one additional dressing to change at that time. Leave this new dressing in place until our follow up visit in the office in about 10-14 days. If staples are in place, they should be removed about 14-20 days after surgery. You may shower with this impermeable dressing in place. DO NOT take a tub bath or go swimming until cleared by your doctor. DO NOT apply lotions, oils, or creams to incision. To increase and promote healing:   Stop Smoking (or at least cut back on smoking).  Eat a well-balanced diet (high in protein and vitamin C)   If your appetite is poor, consider nutritional supplements like Ensure, Glucerna, or [place] Instant Breakfast.   If you are diabetic, controlling you blood sugars is very important to prevent infection and promote wound healing. Nutrition:   If you were on a supplement such as Ensure or Glucerna) while in the hospital, please continue using them with each meal for the next 30 days.    Eat a well-balanced diet - High in protein, high in vitamins and minerals, especially vitamin C and zinc.     Restrictions:   Remember your \"BLT's\"    1. Limited bending at waist    2. Lift no more than 10 pounds    3. No Twisting     If you were given a brace, wear it when out of bed. Warning signs : Please call your physician immediately at [phone] if you have   Bleeding from incision that is constant.  Change in mental status (unusual behavior or confusion)   If your incision develops redness or swelling   Change in wound drainage (increase in amount, color, or foul odor)   [place] over 101.5 degrees Fahrenheit    Headache that is not relieved with pain medication   Tenderness or redness in the calf of your leg    Emergency: CALL 911 if you have   Shortness of breath   Chest pain   Localized chest pain when coughing or taking a deep breath    Follow-up  Please call Dr. Iliana Masterson office for a follow up appointment in 2 weeks at [phone]. You can return to work when cleared by a physician. During normal business hours you may reach Dr. Jimmy Phalen' team directly at [phone] if you have concerns or questions.     Hollie Carney

## 2018-08-08 ENCOUNTER — HOME HEALTH ADMISSION (OUTPATIENT)
Dept: HOME HEALTH SERVICES | Facility: HOME HEALTH | Age: 83
End: 2018-08-08
Payer: MEDICARE

## 2018-08-08 VITALS
DIASTOLIC BLOOD PRESSURE: 49 MMHG | SYSTOLIC BLOOD PRESSURE: 130 MMHG | TEMPERATURE: 98.2 F | RESPIRATION RATE: 16 BRPM | HEART RATE: 69 BPM | BODY MASS INDEX: 26.5 KG/M2 | WEIGHT: 179.45 LBS | OXYGEN SATURATION: 98 %

## 2018-08-08 LAB — HGB BLD-MCNC: 11.4 G/DL (ref 12.1–17)

## 2018-08-08 PROCEDURE — 97530 THERAPEUTIC ACTIVITIES: CPT

## 2018-08-08 PROCEDURE — 97535 SELF CARE MNGMENT TRAINING: CPT | Performed by: OCCUPATIONAL THERAPIST

## 2018-08-08 PROCEDURE — 85018 HEMOGLOBIN: CPT | Performed by: ORTHOPAEDIC SURGERY

## 2018-08-08 PROCEDURE — 97116 GAIT TRAINING THERAPY: CPT

## 2018-08-08 PROCEDURE — 97530 THERAPEUTIC ACTIVITIES: CPT | Performed by: OCCUPATIONAL THERAPIST

## 2018-08-08 PROCEDURE — 74011250637 HC RX REV CODE- 250/637: Performed by: NURSE PRACTITIONER

## 2018-08-08 PROCEDURE — 36415 COLL VENOUS BLD VENIPUNCTURE: CPT | Performed by: ORTHOPAEDIC SURGERY

## 2018-08-08 PROCEDURE — 74011250637 HC RX REV CODE- 250/637: Performed by: ORTHOPAEDIC SURGERY

## 2018-08-08 RX ADMIN — ACETAMINOPHEN 1000 MG: 500 TABLET ORAL at 05:25

## 2018-08-08 RX ADMIN — FAMOTIDINE 20 MG: 20 TABLET, FILM COATED ORAL at 09:11

## 2018-08-08 RX ADMIN — SENNOSIDES AND DOCUSATE SODIUM 1 TABLET: 8.6; 5 TABLET ORAL at 09:11

## 2018-08-08 RX ADMIN — TAMSULOSIN HYDROCHLORIDE 0.4 MG: 0.4 CAPSULE ORAL at 09:10

## 2018-08-08 RX ADMIN — AMLODIPINE BESYLATE 5 MG: 5 TABLET ORAL at 09:11

## 2018-08-08 RX ADMIN — GABAPENTIN 300 MG: 300 CAPSULE ORAL at 09:11

## 2018-08-08 RX ADMIN — ACETAMINOPHEN 1000 MG: 500 TABLET ORAL at 12:17

## 2018-08-08 RX ADMIN — ACETAMINOPHEN 1000 MG: 500 TABLET ORAL at 01:14

## 2018-08-08 RX ADMIN — Medication 10 ML: at 05:26

## 2018-08-08 RX ADMIN — POLYETHYLENE GLYCOL 3350 17 G: 17 POWDER, FOR SOLUTION ORAL at 09:10

## 2018-08-08 NOTE — PROGRESS NOTES
Pt will discharge home with [facility] services via [facility]. CM sent referral via CC for [facility] services. Care Management Interventions  PCP Verified by CM: Yes  Mode of Transport at Discharge:  Other (see comment)  Transition of Care Consult (CM Consult): [address]: Yes  Discharge Durable Medical Equipment: No  Health Maintenance Reviewed: Yes  Physical Therapy Consult: Yes  Occupational Therapy Consult: Yes  Speech Therapy Consult: No  Current Support Network: Lives with Spouse, Own Home  Confirm Follow Up Transport: Family  Plan discussed with Pt/Family/Caregiver: Yes  Freedom of Choice Offered: Yes  Discharge Location  Discharge Placement: Home with home health    KATIA Sumner, [address] [place] [address]   [phone]

## 2018-08-08 NOTE — PROGRESS NOTES
Ortho NP Note:    Pt resting in bed, wife at bedside  No complaints. Progressing more with PT  + Void and tolerating PO  Will ask nursing to change KIMBERLY before discharge.   Plan to remove drain as output has slowed  Home with wife after PT today    Marlena Vitale NP

## 2018-08-08 NOTE — PROGRESS NOTES
ORTHO POST OP SPINE PROGRESS NOTE    2018  Admit Date: 2018  Admit Diagnosis: LUMBAGO, H&P, STEOSIS, POST LAMINECTOMY  HNP (herniated nucleus pulposus), lumbar  Procedure: Procedure(s):  REVISION L5-S1 DECOMPRESSION WITH FUSION  Post Op day: 2 Days Post-Op    Subjective:     Hue Camacho is a patient who has complaints Left-sided low back pain and left hip pain status post revision L5-S1 decompression and fusion with previous L2 through L5 fusion. He is tolerating p.o. and able to void. Wife at bedside. He states last night as he was having high output from his drain and had not yet worked with stairs during physical therapy. He is motivated this morning to progress. Review of Systems: Pertinent items are noted in HPI. Objective:     PT/OT:   Distance Ambulated:           Time Ambulated (min):        Ambulation Response:        Assistive Device:              Assistive Device: Fall prevention device, Walker (comment)    Vital Signs:    Blood pressure 137/67, pulse 87, temperature 98 °F (36.7 °C), resp. rate 16, weight 81.4 kg (179 lb 7.3 oz), SpO2 94 %.     Temp (24hrs), Av.5 °F (36.9 °C), Min:98 °F (36.7 °C), Max:99.3 °F (37.4 °C)          1901 -  0700  In: 300 [I.V.:300]  Out: 2970 [Urine:2570; Drains:350]    LAB:    Recent Labs      18   0535   HGB  11.4*       Wound/Drain Assessment:  Drain:      Dressing:     Physical Exam:  Neurological: no deficit  Incision bloody and KIMBERLY intact, holding suction  5/5 strength bilateral lower extremities    Assessment:      Patient Active Problem List   Diagnosis Code    HNP (herniated nucleus pulposus), lumbar M51.26    Constipation K59.00    Hyperkalemia E87.5    Allergic rhinitis J30.9    Herpes zoster dermatitis B02.8, L30.8    Meningitis, viral A87.9    Arthritis of knee, left M17.12    Sciatica of left side associated with disorder of lumbosacral spine M53.9    Spinal stenosis M48.00    Hypertension I10    Sciatica M54.30    Insomnia G47.00    Prostate cancer screening Z12.5    Osteoarthritis M19.90       Plan:     Continue PT/OT/Rehab  Discontinue: IV and drain Lumbar  Consult: PT  and OT    Discharge To: Home. ?   Home health pending progress   May need KIMBERLY  Changed before discharge    Signed By: KRYSTYNA Brannon

## 2018-08-08 NOTE — PROGRESS NOTES
Problem: Mobility Impaired (Adult and Pediatric)  Goal: *Acute Goals and Plan of Care (Insert Text)  Physical Therapy Goals  Initiated 8/7/2018    1. Patient will perform sit to stand with independence within 4 days. 2. Patient will ambulate with independence for 200 feet with the least restrictive device within 4 days. 3. Patient will ascend/descend 12 stairs with 1 handrail(s) with independence within 4 days. 4. Patient will verbalize and demonstrate understanding of spinal precautions (No bending, lifting greater than 5 lbs, or twisting; log-roll technique; frequent repositioning as instructed) within 4 days. physical Therapy TREATMENT  Patient: Martinez Domingo (47 y.o. male)  Date: 8/8/2018  Diagnosis: LUMBAGO, H&P, STEOSIS, POST LAMINECTOMY  HNP (herniated nucleus pulposus), lumbar <principal problem not specified>  Procedure(s) (LRB):  REVISION L5-S1 DECOMPRESSION WITH FUSION (N/A) 2 Days Post-Op  Precautions: Back  Chart, physical therapy assessment, plan of care and goals were reviewed. ASSESSMENT:  Patient able to safely complete stair training this date, as well as education on concerns over bed mobility performance to his own and spouses satisfaction. He is much more steady with use of RW, as attempted to utilize [facility] as patient initially seen forgoing use of RW for short transfers. With [facility], and basic head turn challenge to simulate community ambulation, he demonstrated multiple balance checks and near tandem ZEHRA. Patient urged to utilize RW ahead with [facility] trials only during therapy. Recommend  PT and OT at [facility]. Progression toward goals:  [x]      Improving appropriately and progressing toward goals  []      Improving slowly and progressing toward goals  []      Not making progress toward goals and plan of care will be adjusted     PLAN:  Patient continues to benefit from skilled intervention to address the above impairments. Continue treatment per established plan of care.   Discharge Recommendations:  Home Health  Further Equipment Recommendations for Discharge: has a RW      SUBJECTIVE:   Patient stated I work hard at my walking.    The patient stated 3/3 back precautions. Reviewed all 3 with patient. OBJECTIVE DATA SUMMARY:   Critical Behavior:  Neurologic State: Alert, Appropriate for age  Orientation Level: Oriented X4  Cognition: Appropriate decision making  Safety/Judgement: Awareness of environment, Insight into deficits  Functional Mobility Training:  Bed Mobility:  Log Rolling: Modified independent  Supine to Sit: Modified independent  Sit to Supine: Modified independent           Brace donned with  supervision/set-up in sitting and standing; VC's to ensure tight enough   Transfers:  Sit to Stand: Independent  Stand to Sit: Independent                             Balance:  Sitting: Intact  Standing: Impaired (device necessary)  Standing - Static: Good;Constant support  Standing - Dynamic : Good (fair with SPC vs good RW )  Ambulation/Gait Training:  Distance (ft): 320 Feet (ft)  Assistive Device: Gait belt;Walker, rolling;Cane, straight  Ambulation - Level of Assistance: Supervision;Stand-by assistance (SBA with SPC )        Gait Abnormalities: Path deviations (deviates mildly with use of SPC during head turn challenge)        Base of Support: Narrowed                 Interventions: Verbal cues; Tactile cues; Safety awareness training        Pain:  Pain Scale 1: Numeric (0 - 10)  Pain Intensity 1: 1              Activity Tolerance: WNL    Please refer to the flowsheet for vital signs taken during this treatment.   After treatment:   [x]  Patient left in no apparent distress sitting up in chair  []  Patient left in no apparent distress in bed  [x]  Call bell left within reach  [x]  Nursing notified  [x]  Caregiver present  []  Bed alarm activated    COMMUNICATION/COLLABORATION:   The patients plan of care was discussed with: Occupational Therapist, Registered Nurse and Hong Bob BRANDON Madera, PT, DPT   Board-Certified Geriatric Clinical Specialist   Certified Exercise Expert for Aging Adults      Time Calculation: 29 mins

## 2018-08-08 NOTE — DISCHARGE SUMMARY
Spine Discharge Summary    Patient ID:  Jose Doran  [id number]  male  80 y.o.  1935    Admit date: 8/6/2018    Discharge date: 8/8/2018    Admitting Physician: Etta Alicea MD     Consulting Physician(s):   Treatment Team: Attending Provider: Etta Alicea MD; Utilization Review: Amanda Saeed; Nurse Practitioner: Camilo Javier NP    Date of Surgery:   8/6/2018     Preoperative Diagnosis:  LUMBAGO, H&P, STEOSIS, POST LAMINECTOMY    Postoperative Diagnosis:   LUMBAGO, H&P, STEOSIS, POST LAMINECTOMY    Procedure(s):  REVISION L5-S1 DECOMPRESSION WITH FUSION     Anesthesia Type:   General     Surgeon: Etta Alicea MD                            HPI:  Pt is a 80 y.o. male who has a history of LUMBAGO, H&P, STEOSIS, POST LAMINECTOMY  with pain and limitations of activities of daily living who presents at this time for a L5-S1 revision PSF following the failure of conservative management. PMH:   Past Medical History:   Diagnosis Date    Allergic rhinitis 12/15/2017    Arthritis of knee, left 12/15/2017    Aseptic meningitis     Chronic pain     chronic back pain    Colon polyps     Constipation 12/15/2017    Dysphagia 12/15/2017    Elevated blood pressure reading 12/15/2017    Fatigue 12/15/2017    Headache 12/15/2017    Hematuria 12/15/2017    Herpes zoster dermatitis 12/15/2017    Hyperkalemia 12/15/2017    Hypertension 12/15/2017    Insomnia 12/15/2017    Osteoarthritis 12/15/2017    Otitis externa 12/15/2017    Prostate cancer screening 12/15/2017    Right groin hernia     Sciatica 12/15/2017    Sciatica of left side associated with disorder of lumbosacral spine 12/15/2017    Shingles     Spinal stenosis 12/15/2017       Body mass index is 26.5 kg/(m^2). : A BMI > 30 is classified as obesity and > 40 is classified as morbid obesity. Medications upon admission :   Prior to Admission Medications   Prescriptions Last Dose Informant Patient Reported? Taking?    ALPRAZolam Alonza Settle) 0.5 mg tablet 8/5/2018 at Unknown time  No Yes   Sig: TAKE 1 TABLET BY MOUTH 3 TIMES A DAY AS NEEDED   Patient taking differently: Take 0.5 mg by mouth nightly. TAKE 1 TABLET BY MOUTH 3 TIMES A DAY AS NEEDED   acetaminophen (TYLENOL) 325 mg tablet Unknown at Unknown time  Yes No   Sig: Take 650 mg by mouth every four (4) hours as needed for Pain. amLODIPine (NORVASC) 5 mg tablet 8/6/2018 at 0600  No Yes   Sig: TAKE 1 TABLET BY MOUTH DAILY   gabapentin (NEURONTIN) 300 mg capsule 8/5/2018 at Unknown time  Yes Yes   Sig: Take 300 mg by mouth three (3) times daily. tiZANidine (ZANAFLEX) 4 mg tablet 8/5/2018 at Unknown time  No Yes   Sig: TAKE 1 TABLET BY MOUTH AT BEDTIME   vit C/E/Zn/coppr/lutein/zeaxan (PRESERVISION AREDS 2 PO) 8/5/2018 at Unknown time  Yes Yes   Sig: Take 1 Tab by mouth daily. Facility-Administered Medications: None        Allergies:  No Known Allergies     Hospital Course: The patient underwent surgery. Complications:  None; patient tolerated the procedure well. Was taken to the PACU in stable condition and then transferred to the ortho floor. Perioperative Antibiotics:  Ancef     Postoperative Pain Management:  Oxycodone      Postoperative transfusions:    Number of units banked PRBCs =   none     Post Op complications: none    Hemoglobin at discharge:    Lab Results   Component Value Date/Time    HGB 11.4 (L) 08/08/2018 05:35 AM    INR 1.0 08/03/2018 03:31 PM       Dressing was changed on POD # 2. Incision - clean, dry and intact. No significant erythema or swelling. Neurovascular exam found to be within normal limits. Wound appears to be healing without any evidence of infection. Pt had a HVAC drain that was removed on POD# 2. Physical Therapy started on the day following surgery and participated in bed mobility, transfers and ambulation.         Gait:  Gait  Base of Support: Narrowed  Speed/Jemima: Pace decreased (<100 feet/min)  Step Length: Left shortened, Right shortened  Gait Abnormalities: Decreased step clearance  Ambulation - Level of Assistance: Contact guard assistance  Distance (ft): 200 Feet (ft)  Assistive Device: Walker, rolling, Gait belt                   Discharged to: Home. Condition on Discharge:   stable    Discharge instructions:    - Take pain medications as prescribed  - Resume pre hospital diet      - Discharge activity: activity as tolerated  - Ambulate as tolerated  - Wound Care Keep wound clean and dry. See discharge instruction sheet. -DISCHARGE MEDICATION LIST     Current Discharge Medication List      START taking these medications    Details   oxyCODONE IR (ROXICODONE) 5 mg immediate release tablet Take 1-2 Tabs by mouth every four (4) hours as needed. Max Daily Amount: 60 mg.  Qty: 80 Tab, Refills: 0    Associated Diagnoses: S/P lumbar spinal fusion      polyethylene glycol (MIRALAX) 17 gram packet Take 1 Packet by mouth daily as needed (constipation) for up to 15 days. Qty: 15 Packet, Refills: 0      senna-docusate (PERICOLACE) 8.6-50 mg per tablet Take 1 Tab by mouth daily. Qty: 30 Tab, Refills: 0      naloxone (NARCAN) 4 mg/actuation nasal spray 1 [place] by IntraNASal route as needed (respiratory depression). Give single spray into one nostril. Call 911. Give additional doses every 2 to 3 minutes alternating nostrils until assistance arrives using a new nasal spray with each dose, if patient does not respond or responds and then relapses. Qty: 1 Each, Refills: 0         CONTINUE these medications which have NOT CHANGED    Details   tiZANidine (ZANAFLEX) 4 mg tablet TAKE 1 TABLET BY MOUTH AT BEDTIME  Qty: 30 Tab, Refills: 3      vit C/E/Zn/coppr/lutein/zeaxan (PRESERVISION AREDS 2 PO) Take 1 Tab by mouth daily.       amLODIPine (NORVASC) 5 mg tablet TAKE 1 TABLET BY MOUTH DAILY  Qty: 30 Tab, Refills: 6      ALPRAZolam (XANAX) 0.5 mg tablet TAKE 1 TABLET BY MOUTH 3 TIMES A DAY AS NEEDED  Qty: 90 Tab, Refills: 0    Comments: Not to exceed 5 additional fills before 10/16/2017NEEDS REFILL, WANTS BRAND NAME. Associated Diagnoses: Anxiety      gabapentin (NEURONTIN) 300 mg capsule Take 300 mg by mouth three (3) times daily. acetaminophen (TYLENOL) 325 mg tablet Take 650 mg by mouth every four (4) hours as needed for Pain.          per medical continuation form      -Follow up in office in 2 weeks      Signed:  Dori Irizarry.  Laurie Brown, MICHAELP-BC, ONP-C  Orthopaedic Nurse Practitioner    8/8/2018  10:47 AM

## 2018-08-08 NOTE — PROGRESS NOTES
Occupational Therapy Goals  Initiated 8/7/2018  1. Patient will perform grooming standing at sink with supervision/set-up within 7 day(s). 2.  Patient will perform sponge bathing with supervision/set-up using AE PRN within 7 day(s). 3.  Patient will perform lower body dressing with supervision/set-up using AE PRN within 7 day(s). 4.  Patient will perform toilet transfers with supervision/set-up within 7 day(s). 5.  Patient will perform all aspects of toileting with supervision/set-up within 7 day(s). Occupational Therapy TREATMENT  Patient: Ash Gay (69 y.o. male)  Date: 8/8/2018  Diagnosis: LUMBAGO, H&P, STEOSIS, POST LAMINECTOMY  HNP (herniated nucleus pulposus), lumbar <principal problem not specified>  Procedure(s) (LRB):  REVISION L5-S1 DECOMPRESSION WITH FUSION (N/A) 2 Days Post-Op  Precautions: Back    ASSESSMENT:  ***  Progression toward goals:  []       Improving appropriately and progressing toward goals  []       Improving slowly and progressing toward goals  []       Not making progress toward goals and plan of care will be adjusted     PLAN:  Patient continues to benefit from skilled intervention to address the above impairments. Continue treatment per established plan of care. Discharge Recommendations:  {AFTER DYQY:[id number]}  Further Equipment Recommendations for Discharge:  { :04414}     SUBJECTIVE:   Patient stated ***.    OBJECTIVE DATA SUMMARY:   Cognitive/Behavioral Status:  Neurologic State: Alert  Orientation Level: Oriented X4  Cognition: Appropriate decision making; Appropriate for age attention/concentration; Appropriate safety awareness        Safety/Judgement: Awareness of environment;Good awareness of safety precautions; Insight into deficits  Functional Mobility and Transfers for ADLs:  Bed Mobility:  Rolling: Modified independent  Supine to Sit: Modified independent  Sit to Supine: Modified independent     Transfers:  Sit to Stand: Independent                Toilet Transfer : Independent           Bathroom Mobility: Supervision/set up      Balance:  Sitting: Intact  Standing: Impaired (device necessary)  Standing - Static: Good;Constant support  Standing - Dynamic : Good (fair with SPC vs good RW )  ADL Intervention:       Grooming  Washing Hands: Independent (standing at sink)  Brushing/Combing Hair: Independent (standing at sink)              Upper Body Dressing Assistance  Pullover Shirt: Supervision/set-up    Lower Body Dressing Assistance  Underpants: Supervision/set-up  Pants With Button/Zipper: Supervision/set-up  Socks: Supervision/set-up  Shoes with Cloth Laces: Minimum assistance    Toileting  Toileting Assistance: Independent  Clothing Management: Independent    Cognitive Retraining  Safety/Judgement: Awareness of environment;Good awareness of safety precautions; Insight into deficits      Reviewed post op spine precautions as they relate to bed mobility and ADL performance. Instructed patient to avoid reaching across the midline of her body to prevent trunk rotation during bathing, dressing and the performance of item retrieval during ADLs and meal prep. Patient instructed in log rolling technique for bed mobility and use of leg crossed technique for LB dressing to prevent trunk rotation and flexion. Instructed patient and family to have needed items placed at a level between shoulder and knee height , so they can be easily accessed to promote proper body mechanics and to minimize risk for LOB. Patient able to verbalize and or demonstrate full understanding completion of this education/training.       After treatment:   [] Patient left in no apparent distress sitting up in chair  [] Patient left in no apparent distress in bed  [] Call bell left within reach  [] Nursing notified  [] Caregiver present  [] Bed alarm activated    COMMUNICATION/COLLABORATION:   The patients plan of care was discussed with: {therapies:[id number]}    Inder Yeung, OTR/L  Time Calculation: 28 mins

## 2018-08-09 ENCOUNTER — PATIENT OUTREACH (OUTPATIENT)
Dept: INTERNAL MEDICINE CLINIC | Age: 83
End: 2018-08-09

## 2018-08-09 ENCOUNTER — HOME CARE VISIT (OUTPATIENT)
Dept: SCHEDULING | Facility: HOME HEALTH | Age: 83
End: 2018-08-09
Payer: MEDICARE

## 2018-08-09 VITALS
SYSTOLIC BLOOD PRESSURE: 142 MMHG | TEMPERATURE: 97.8 F | RESPIRATION RATE: 16 BRPM | OXYGEN SATURATION: 97 % | DIASTOLIC BLOOD PRESSURE: 68 MMHG | BODY MASS INDEX: 26.66 KG/M2 | WEIGHT: 180 LBS | HEIGHT: 69 IN | HEART RATE: 80 BPM

## 2018-08-09 PROCEDURE — 3331090002 HH PPS REVENUE DEBIT

## 2018-08-09 PROCEDURE — G0151 HHCP-SERV OF PT,EA 15 MIN: HCPCS

## 2018-08-09 PROCEDURE — 400013 HH SOC

## 2018-08-09 PROCEDURE — 3331090001 HH PPS REVENUE CREDIT

## 2018-08-09 NOTE — PROGRESS NOTES
To: Korina Brothers MD    From: Robert Garcia MD    Thank you for sending Ree Sepulveda to see us. Encounter Date: 7/2/2018  History and Physical    Assessment:   Right inguinal hernia. Asymptomatic. Comorbid back issues -- surgery soon. Body mass index is 27.32 kg/(m^2). Plan: Will hold off on repair for now. Low risk for strangulation. Back issues taking precedent in his mind. Miralax daily for constipation. Discussed possibility of incarceration, strangulation, increase in size of the hernia over time, and the risk of emergency surgery in the face of strangulation. Discussed techniques for reducing the hernia should it not reduce spontaneously. Knows to call immediately for incarceration. Also discussed the risk of surgery including recurrence, bleeding, hematoma, infection, difficulty voiding, chronic nerve pain, and the risks of general anesthetic. The patient expressed understanding of the risks and all questions were answered to the patient's satisfaction. HPI:   Ree Sepulveda is a 80 y.o. male who is seen in consultation at the request of Korina Brothers MD for right inguinal hernia. Symptoms were first noted this past April. Straining for constipation and began to notice it. No pain. Patient has a bulge. Bulge is reducible. Patient does not have urinary symptoms. Patient has difficulty with bowel movements. Patient does not have nausea or vomiting. Patient has history of previous hernia surgery -- [facility].     Past Medical History:   Diagnosis Date    Allergic rhinitis 12/15/2017    Arthritis of knee, left 12/15/2017    Aseptic meningitis     Chronic pain     chronic back pain    Colon polyps     Constipation 12/15/2017    Dysphagia 12/15/2017    Elevated blood pressure reading 12/15/2017    Fatigue 12/15/2017    Headache 12/15/2017    Hematuria 12/15/2017    Herpes zoster dermatitis 12/15/2017    Hyperkalemia 12/15/2017  Hypertension 12/15/2017    Insomnia 12/15/2017    Osteoarthritis 12/15/2017    Otitis externa 12/15/2017    Prostate cancer screening 12/15/2017    Right groin hernia     Sciatica 12/15/2017    Sciatica of left side associated with disorder of lumbosacral spine 12/15/2017    Shingles     Spinal stenosis 12/15/2017     Past Surgical History:   Procedure Laterality Date    COLONOSCOPY,DIAGNOSTIC  11/11/2014         HX CATARACT REMOVAL      with lens implants    HX HERNIA REPAIR Left     HX ORTHOPAEDIC  2008    rods placed in back    HX ORTHOPAEDIC  10/6/15    LEFT L5-S1 MICRODISCECTOMY     HX OTHER SURGICAL      steroid injection for back pain    [place] EGD INSERT GUIDE WIRE DILATOR PASSAGE ESOPHAGUS  10/17/2013         [place] EGD TRANSORAL BIOPSY SINGLE/MULTIPLE  10/17/2013           Family History   Problem Relation Age of Onset    Lung Disease Mother      lung cancer    Hypertension Mother     Heart Failure Mother     Hypertension Father     Heart Failure Father       Social History   Substance Use Topics    Smoking status: Never Smoker    Smokeless tobacco: Never Used    Alcohol use No      Current Outpatient Prescriptions   Medication Sig    ALPRAZolam (XANAX) 0.5 mg tablet TAKE 1 TABLET BY MOUTH 3 TIMES A DAY AS NEEDED (Patient taking differently: Take 0.5 mg by mouth nightly. TAKE 1 TABLET BY MOUTH 3 TIMES A DAY AS NEEDED)    gabapentin (NEURONTIN) 300 mg capsule Take 300 mg by mouth three (3) times daily.  acetaminophen (TYLENOL) 325 mg tablet Take 650 mg by mouth every four (4) hours as needed for Pain.  oxyCODONE IR (ROXICODONE) 5 mg immediate release tablet Take 1-2 Tabs by mouth every four (4) hours as needed. Max Daily Amount: 60 mg.  polyethylene glycol (MIRALAX) 17 gram packet Take 1 Packet by mouth daily as needed (constipation) for up to 15 days.  senna-docusate (PERICOLACE) 8.6-50 mg per tablet Take 1 Tab by mouth daily.     naloxone (NARCAN) 4 mg/actuation nasal spray 1 [place] by IntraNASal route as needed (respiratory depression). Give single spray into one nostril. Call 911. Give additional doses every 2 to 3 minutes alternating nostrils until assistance arrives using a new nasal spray with each dose, if patient does not respond or responds and then relapses.  tiZANidine (ZANAFLEX) 4 mg tablet TAKE 1 TABLET BY MOUTH AT BEDTIME    vit C/E/Zn/coppr/lutein/zeaxan (PRESERVISION AREDS 2 PO) Take 1 Tab by mouth daily.  amLODIPine (NORVASC) 5 mg tablet TAKE 1 TABLET BY MOUTH DAILY     No current facility-administered medications for this visit. Allergies:  No Known Allergies     Review of Systems:  10 systems reviewed. See scanned sheet in \"Media\" section. See HPI for pertinent positives and negatives. Objective:     Visit Vitals    /77 (BP 1 Location: Left arm, BP Patient Position: Sitting)    Pulse 73    Temp 97.9 °F (36.6 °C)    Resp 18    Ht 5' 9\" (1.753 m)    Wt 83.9 kg (185 lb)    SpO2 94%    BMI 27.32 kg/m2       Physical Exam:  General appearance  Alert, cooperative, no distress, appears stated age   HEENT Anicteric   Neck Supple       Lungs   Clear to auscultation bilaterally   Heart  Regular rate and rhythm. No murmur, rub or gallop   Abdomen   Soft. Bowel sounds normal. No organomegaly. RIH. Reducible. No LIH.      Extremities no cyanosis or edema   Pulses 2+ right radial   Skin Skin color, texture, turgor normal.   Lymph nodes Inguinal nodes normal.   Neurologic Without overt sensory or motor deficit     Signed By: Chelly Rollins MD     August 9, 2018

## 2018-08-10 ENCOUNTER — PATIENT OUTREACH (OUTPATIENT)
Dept: INTERNAL MEDICINE CLINIC | Age: 83
End: 2018-08-10

## 2018-08-10 ENCOUNTER — HOME CARE VISIT (OUTPATIENT)
Dept: HOME HEALTH SERVICES | Facility: HOME HEALTH | Age: 83
End: 2018-08-10
Payer: MEDICARE

## 2018-08-10 PROCEDURE — 3331090002 HH PPS REVENUE DEBIT

## 2018-08-10 PROCEDURE — 3331090001 HH PPS REVENUE CREDIT

## 2018-08-10 NOTE — PROGRESS NOTES
8/10/18-NN left second message for patient to call back to do MARIA ELENA OBRIEN call re: recent hospital stay/vs

## 2018-08-10 NOTE — PROGRESS NOTES
Hospital Discharge Follow-Up      Date/Time:  8/10/2018 4:13 PM    Patient was admitted to [facility] on 18 and discharged on 18 for lumbago, steosis, post laminectomy and revision L5-S1 decompression and fusion. The physician discharge summary was available at the time of outreach. Patient was contacted within three business days of discharge. Top Challenges reviewed with the provider            Method of communication with provider :chart routing    Inpatient RRAT score: 7  Was this a readmission? no   Patient stated reason for the readmission: n/a    Nurse Navigator (NN) contacted the family by telephone to perform post hospital discharge assessment. Verified name and  with family as identifiers. Provided introduction to self, and explanation of the Nurse Navigator role. Reviewed discharge instructions and red flags with family who verbalized understanding. Family given an opportunity to ask questions and does not have any further questions or concerns at this time. The family agrees to contact the PCP office for questions related to their healthcare. NN provided contact information for future reference. Disease Specific:   N/A    Summary of patient's top problems:  1. Patient had revision of L5-J4jdxyozimpnpjn with fusion. Currently walking with cane. Using mostly acetaminophen currently for pain control.         Home Health orders at discharge: [address]: [facility]  Date of initial [facility] ordered/company: n/a  Durable Medical Equipment received: n/a    Barriers to care? none    Advance Care Planning:   Does patient have an Advance Directive:  not on file; education provided     Medication(s):   New Medications at Discharge: narcan, roxicodone, miralax, pericolace  Changed Medications at Discharge: xanax  Discontinued Medications at Discharge: n/a    Medication reconciliation was performed with family, who verbalizes understanding of administration of home medications. There were no barriers to obtaining medications identified at this time. Referral to Pharm D needed: no     Current Outpatient Prescriptions   Medication Sig    polyethylene glycol (MIRALAX) 17 gram packet Take 1 Packet by mouth daily as needed (constipation) for up to 15 days.  tiZANidine (ZANAFLEX) 4 mg tablet TAKE 1 TABLET BY MOUTH AT BEDTIME    vit C/E/Zn/coppr/lutein/zeaxan (PRESERVISION AREDS 2 PO) Take 1 Tab by mouth daily.  amLODIPine (NORVASC) 5 mg tablet TAKE 1 TABLET BY MOUTH DAILY    ALPRAZolam (XANAX) 0.5 mg tablet TAKE 1 TABLET BY MOUTH 3 TIMES A DAY AS NEEDED (Patient taking differently: Take 0.5 mg by mouth nightly. TAKE 1 TABLET BY MOUTH 3 TIMES A DAY AS NEEDED)    gabapentin (NEURONTIN) 300 mg capsule Take 300 mg by mouth three (3) times daily.  acetaminophen (TYLENOL) 325 mg tablet Take 650 mg by mouth every four (4) hours as needed for Pain.  oxyCODONE IR (ROXICODONE) 5 mg immediate release tablet Take 1-2 Tabs by mouth every four (4) hours as needed. Max Daily Amount: 60 mg.    senna-docusate (PERICOLACE) 8.6-50 mg per tablet Take 1 Tab by mouth daily.  naloxone (NARCAN) 4 mg/actuation nasal spray 1 [place] by IntraNASal route as needed (respiratory depression). Give single spray into one nostril. Call 911. Give additional doses every 2 to 3 minutes alternating nostrils until assistance arrives using a new nasal spray with each dose, if patient does not respond or responds and then relapses. No current facility-administered medications for this visit. There are no discontinued medications.     [facility] follow up appointment(s): Future Appointments  Date Time Provider Diane Fisher   8/13/2018 To Be Determined Lucretia Velazquez OT [facility] [address]   8/14/2018 To Be Determined Valentin Durand [place] [id number] [facility]   8/16/2018 To Be Determined Valentin Durand [facility]   8/20/2018 To Be Determined Valentin Durand [place]   12/20/2018 1:40 PM RACHEL Dietrich MD [address] [place] [address],[facility], [id number]      Non-[facility] follow up appointment(s): n/a  [facility]:  n/a       Goals      ACP            8/10/18-NN discussed ACP with patient's wife. Currently patient does not have one on file. Wife would like education material sent to their home. NN will mail \"Your Right to Decide\" pamphlet to patient. / vs       Understands red flags post discharge. 8/10/18-NN spoke with patient's wife re: patient's post hospital stay for revision of L5-S1 decompression with fusion. Reviewed with wife signs and symptoms to be aware of for any infection post-op. Discussed importance of reporting any fever, abnormal or excessive drainage at incision site, extreme fatigue to MD immediately. Patient and wife will be alert to any of those signs, and report should they occur.   NN will check back in 2 weeks with patient to see how he is progressing/vs

## 2018-08-11 PROCEDURE — 3331090001 HH PPS REVENUE CREDIT

## 2018-08-11 PROCEDURE — 3331090002 HH PPS REVENUE DEBIT

## 2018-08-12 PROCEDURE — 3331090002 HH PPS REVENUE DEBIT

## 2018-08-12 PROCEDURE — 3331090001 HH PPS REVENUE CREDIT

## 2018-08-13 ENCOUNTER — HOME CARE VISIT (OUTPATIENT)
Dept: SCHEDULING | Facility: HOME HEALTH | Age: 83
End: 2018-08-13
Payer: MEDICARE

## 2018-08-13 VITALS
TEMPERATURE: 98 F | DIASTOLIC BLOOD PRESSURE: 70 MMHG | HEART RATE: 76 BPM | OXYGEN SATURATION: 98 % | SYSTOLIC BLOOD PRESSURE: 130 MMHG

## 2018-08-13 PROCEDURE — 3331090002 HH PPS REVENUE DEBIT

## 2018-08-13 PROCEDURE — 3331090001 HH PPS REVENUE CREDIT

## 2018-08-13 PROCEDURE — G0152 HHCP-SERV OF OT,EA 15 MIN: HCPCS

## 2018-08-14 ENCOUNTER — HOME CARE VISIT (OUTPATIENT)
Dept: SCHEDULING | Facility: HOME HEALTH | Age: 83
End: 2018-08-14
Payer: MEDICARE

## 2018-08-14 VITALS
RESPIRATION RATE: 16 BRPM | TEMPERATURE: 97.4 F | OXYGEN SATURATION: 98 % | SYSTOLIC BLOOD PRESSURE: 138 MMHG | DIASTOLIC BLOOD PRESSURE: 62 MMHG | HEART RATE: 84 BPM

## 2018-08-14 PROCEDURE — 3331090002 HH PPS REVENUE DEBIT

## 2018-08-14 PROCEDURE — G0151 HHCP-SERV OF PT,EA 15 MIN: HCPCS

## 2018-08-14 PROCEDURE — 3331090001 HH PPS REVENUE CREDIT

## 2018-08-15 PROCEDURE — 3331090002 HH PPS REVENUE DEBIT

## 2018-08-15 PROCEDURE — 3331090001 HH PPS REVENUE CREDIT

## 2018-08-16 ENCOUNTER — HOME CARE VISIT (OUTPATIENT)
Dept: SCHEDULING | Facility: HOME HEALTH | Age: 83
End: 2018-08-16
Payer: MEDICARE

## 2018-08-16 PROCEDURE — 3331090001 HH PPS REVENUE CREDIT

## 2018-08-16 PROCEDURE — 3331090002 HH PPS REVENUE DEBIT

## 2018-08-16 PROCEDURE — 3331090003 HH PPS REVENUE ADJ

## 2018-08-16 PROCEDURE — G0151 HHCP-SERV OF PT,EA 15 MIN: HCPCS

## 2018-08-17 VITALS
TEMPERATURE: 97.6 F | SYSTOLIC BLOOD PRESSURE: 144 MMHG | RESPIRATION RATE: 16 BRPM | HEART RATE: 76 BPM | OXYGEN SATURATION: 98 % | DIASTOLIC BLOOD PRESSURE: 72 MMHG

## 2018-08-29 ENCOUNTER — PATIENT OUTREACH (OUTPATIENT)
Dept: INTERNAL MEDICINE CLINIC | Age: 83
End: 2018-08-29

## 2018-09-05 ENCOUNTER — PATIENT OUTREACH (OUTPATIENT)
Dept: INTERNAL MEDICINE CLINIC | Age: 83
End: 2018-09-05

## 2018-09-05 NOTE — PROGRESS NOTES
Goals  ACP   
     
  8/10/18-NN discussed ACP with patient's wife. Currently patient does not have one on file. Wife would like education material sent to their home. NN will mail \"Your Right to Decide\" pamphlet to patient. / vs 
  
  Understands red flags post discharge. 9/5/18-NN spoke to patient's wife. He is progressing well from his back surgery. He is having no pain in that area and is getting around well. He has f/u with Dr. Amy Bridges in two weeks. /vs 
 
8/29/18-NN left message for patient to call back to check on his progress post L5-S-1 fusion. / vs 
 
8/10/18-NN spoke with patient's wife re: patient's post hospital stay for revision of L5-S1 decompression with fusion. Reviewed with wife signs and symptoms to be aware of for any infection post-op. Discussed importance of reporting any fever, abnormal or excessive drainage at incision site, extreme fatigue to MD immediately. Patient and wife will be alert to any of those signs, and report should they occur. NN will check back in 2 weeks with patient to see how he is progressing/vs 
  
  
 
Patient has graduated from the Transitions of Care Coordination  program on 9/5/18. Patient's symptoms are stable at this time. Patient/family has the ability to self-manage. Care management goals have been completed at this time. No further nurse navigator follow up scheduled. Goals Addressed Most Recent  COMPLETED: ACP   No change (9/5/2018) 8/10/18-NN discussed ACP with patient's wife. Currently patient does not have one on file. Wife would like education material sent to their home. NN will mail \"Your Right to Decide\" pamphlet to patient. / vs 
  
  COMPLETED: Understands red flags post discharge. On track (9/5/2018)  
       
  9/5/18-NN spoke to patient's wife. He is progressing well from his back surgery. He is having no pain in that area and is getting around well. He has f/u with Dr. Leia Nova in two weeks. /vs 
 
8/29/18-NN left message for patient to call back to check on his progress post L5-S-1 fusion. / vs 
 
8/10/18-NN spoke with patient's wife re: patient's post hospital stay for revision of L5-S1 decompression with fusion. Reviewed with wife signs and symptoms to be aware of for any infection post-op. Discussed importance of reporting any fever, abnormal or excessive drainage at incision site, extreme fatigue to MD immediately. Patient and wife will be alert to any of those signs, and report should they occur. NN will check back in 2 weeks with patient to see how he is progressing/vs 
  
  
 
 
Pt has nurse navigator's contact information for any further questions, concerns, or needs. Patients upcoming visits:  Future Appointments Date Time Provider Diane Fisher 12/20/2018 1:40 PM RACHEL Harrison MD [address] [place] [address],[facility], [address]

## 2018-10-21 DIAGNOSIS — F41.9 ANXIETY: ICD-10-CM

## 2018-10-22 RX ORDER — ALPRAZOLAM 0.5 MG/1
TABLET ORAL
Qty: 90 TAB | Refills: 0 | Status: SHIPPED | OUTPATIENT
Start: 2018-10-22 | End: 2019-01-16 | Stop reason: SDUPTHER

## 2018-10-29 NOTE — TELEPHONE ENCOUNTER
Requested Prescriptions     Pending Prescriptions Disp Refills    amLODIPine (NORVASC) 5 mg tablet 90 Tab 3     Sig: Take 1 Tab by mouth daily.        Last Refill: 05/27/17  Next Appointment:12/20/18

## 2018-10-30 RX ORDER — AMLODIPINE BESYLATE 5 MG/1
5 TABLET ORAL DAILY
Qty: 90 TAB | Refills: 3 | Status: SHIPPED | OUTPATIENT
Start: 2018-10-30 | End: 2019-11-19 | Stop reason: SDUPTHER

## 2018-10-31 RX ORDER — TIZANIDINE 4 MG/1
4 TABLET ORAL
Qty: 90 TAB | Refills: 3 | Status: SHIPPED | OUTPATIENT
Start: 2018-10-31 | End: 2019-10-13 | Stop reason: SDUPTHER

## 2018-10-31 NOTE — TELEPHONE ENCOUNTER
Requested Prescriptions     Pending Prescriptions Disp Refills    tiZANidine (ZANAFLEX) 4 mg tablet 90 Tab 3     Sig: Take 1 Tab by mouth nightly.        Last Refill: 08/06/18  Next Appointment:12/20/18

## 2018-11-28 RX ORDER — POLYETHYLENE GLYCOL 3350 17 G/17G
17 POWDER, FOR SOLUTION ORAL
COMMUNITY
End: 2019-06-27 | Stop reason: SDUPTHER

## 2018-11-29 ENCOUNTER — HOSPITAL ENCOUNTER (OUTPATIENT)
Age: 83
Setting detail: OUTPATIENT SURGERY
Discharge: HOME | End: 2018-11-29
Attending: INTERNAL MEDICINE | Admitting: INTERNAL MEDICINE
Payer: MEDICARE

## 2018-11-29 ENCOUNTER — ANESTHESIA (OUTPATIENT)
Dept: ENDOSCOPY | Age: 83
End: 2018-11-29
Payer: MEDICARE

## 2018-11-29 ENCOUNTER — ANESTHESIA EVENT (OUTPATIENT)
Dept: ENDOSCOPY | Age: 83
End: 2018-11-29
Payer: MEDICARE

## 2018-11-29 VITALS
BODY MASS INDEX: 27.33 KG/M2 | DIASTOLIC BLOOD PRESSURE: 93 MMHG | HEIGHT: 69 IN | HEART RATE: 62 BPM | TEMPERATURE: 98 F | OXYGEN SATURATION: 100 % | RESPIRATION RATE: 14 BRPM | SYSTOLIC BLOOD PRESSURE: 132 MMHG | WEIGHT: 184.5 LBS

## 2018-11-29 PROCEDURE — 74011250636 HC RX REV CODE- 250/636: Performed by: INTERNAL MEDICINE

## 2018-11-29 PROCEDURE — 74011250636 HC RX REV CODE- 250/636

## 2018-11-29 PROCEDURE — 76040000019: Performed by: INTERNAL MEDICINE

## 2018-11-29 PROCEDURE — 76060000031 HC ANESTHESIA FIRST 0.5 HR: Performed by: INTERNAL MEDICINE

## 2018-11-29 RX ORDER — NALOXONE HYDROCHLORIDE 0.4 MG/ML
0.4 INJECTION, SOLUTION INTRAMUSCULAR; INTRAVENOUS; SUBCUTANEOUS
Status: DISCONTINUED | OUTPATIENT
Start: 2018-11-29 | End: 2018-11-29 | Stop reason: HOSPADM

## 2018-11-29 RX ORDER — PROPOFOL 10 MG/ML
INJECTION, EMULSION INTRAVENOUS AS NEEDED
Status: DISCONTINUED | OUTPATIENT
Start: 2018-11-29 | End: 2018-11-29 | Stop reason: HOSPADM

## 2018-11-29 RX ORDER — MIDAZOLAM HYDROCHLORIDE 1 MG/ML
.25-5 INJECTION, SOLUTION INTRAMUSCULAR; INTRAVENOUS
Status: DISCONTINUED | OUTPATIENT
Start: 2018-11-29 | End: 2018-11-29 | Stop reason: HOSPADM

## 2018-11-29 RX ORDER — SODIUM CHLORIDE 9 MG/ML
75 INJECTION, SOLUTION INTRAVENOUS CONTINUOUS
Status: DISCONTINUED | OUTPATIENT
Start: 2018-11-29 | End: 2018-11-29 | Stop reason: HOSPADM

## 2018-11-29 RX ORDER — SODIUM CHLORIDE 0.9 % (FLUSH) 0.9 %
5-10 SYRINGE (ML) INJECTION EVERY 8 HOURS
Status: DISCONTINUED | OUTPATIENT
Start: 2018-11-29 | End: 2018-11-29 | Stop reason: HOSPADM

## 2018-11-29 RX ORDER — FLUMAZENIL 0.1 MG/ML
0.2 INJECTION INTRAVENOUS
Status: DISCONTINUED | OUTPATIENT
Start: 2018-11-29 | End: 2018-11-29 | Stop reason: HOSPADM

## 2018-11-29 RX ORDER — FENTANYL CITRATE 50 UG/ML
25 INJECTION, SOLUTION INTRAMUSCULAR; INTRAVENOUS
Status: DISCONTINUED | OUTPATIENT
Start: 2018-11-29 | End: 2018-11-29 | Stop reason: HOSPADM

## 2018-11-29 RX ORDER — SODIUM CHLORIDE 0.9 % (FLUSH) 0.9 %
5-10 SYRINGE (ML) INJECTION AS NEEDED
Status: DISCONTINUED | OUTPATIENT
Start: 2018-11-29 | End: 2018-11-29 | Stop reason: HOSPADM

## 2018-11-29 RX ORDER — DEXTROMETHORPHAN/PSEUDOEPHED 2.5-7.5/.8
1.2 DROPS ORAL
Status: DISCONTINUED | OUTPATIENT
Start: 2018-11-29 | End: 2018-11-29 | Stop reason: HOSPADM

## 2018-11-29 RX ORDER — EPINEPHRINE 0.1 MG/ML
1 INJECTION INTRACARDIAC; INTRAVENOUS
Status: DISCONTINUED | OUTPATIENT
Start: 2018-11-29 | End: 2018-11-29 | Stop reason: HOSPADM

## 2018-11-29 RX ORDER — ATROPINE SULFATE 0.1 MG/ML
0.5 INJECTION INTRAVENOUS
Status: DISCONTINUED | OUTPATIENT
Start: 2018-11-29 | End: 2018-11-29 | Stop reason: HOSPADM

## 2018-11-29 RX ADMIN — PROPOFOL 50 MG: 10 INJECTION, EMULSION INTRAVENOUS at 07:49

## 2018-11-29 RX ADMIN — SODIUM CHLORIDE 75 ML/HR: 900 INJECTION, SOLUTION INTRAVENOUS at 07:30

## 2018-11-29 RX ADMIN — PROPOFOL 50 MG: 10 INJECTION, EMULSION INTRAVENOUS at 07:55

## 2018-11-29 RX ADMIN — PROPOFOL 50 MG: 10 INJECTION, EMULSION INTRAVENOUS at 07:52

## 2018-11-29 RX ADMIN — PROPOFOL 50 MG: 10 INJECTION, EMULSION INTRAVENOUS at 07:47

## 2018-11-29 NOTE — PERIOP NOTES
[facility] 
1935 
[id number] Situation: 
Verbal report received from: [facility] Procedure: Procedure(s): 
COLONOSCOPY Background: 
 
Preoperative diagnosis: CHANGE IN BOWEL HABITS Postoperative diagnosis: Sigmoid diverticulosis, internal hemorrhoids :  Dr. Adri Schmidt Assistant(s): Endoscopy Technician-1: Gregor Leonardo Endoscopy RN-1: Doris Stoddard RN Specimens: * No specimens in log * H. Pylori  no Assessment: 
Intra-procedure medications Anesthesia gave intra-procedure sedation and medications, see anesthesia flow sheet yes Intravenous fluids: NS@ Shahid Graves Vital signs stable Abdominal assessment: round and soft Recommendation: 
Discharge patient per MD order. Family or Friend Permission to share finding with family or friend yes

## 2018-11-29 NOTE — H&P
Gastroenterology Outpatient History and Physical 
 
Patient: Mary Nava 
 
Physician: Jaelyn Recinos MD 
 
Chief Complaint: Change in bowel habits History of Present Illness: 79yo M with Change in bowel habits. No anemia or bleeding noted. Last colonoscopy 11/2014 History: 
Past Medical History:  
Diagnosis Date  Allergic rhinitis 12/15/2017  Arthritis of knee, left 12/15/2017  Aseptic meningitis  Chronic pain   
 chronic back pain  Colon polyps  Constipation 12/15/2017  Dysphagia 12/15/2017  Elevated blood pressure reading 12/15/2017  Fatigue 12/15/2017  Headache 12/15/2017  Hematuria 12/15/2017  Herpes zoster dermatitis 12/15/2017  Hyperkalemia 12/15/2017  Hypertension 12/15/2017  Insomnia 12/15/2017  Osteoarthritis 12/15/2017  Otitis externa 12/15/2017  Prostate cancer screening 12/15/2017  Right groin hernia  Sciatica 12/15/2017  Sciatica of left side associated with disorder of lumbosacral spine 12/15/2017  Shingles  Spinal stenosis 12/15/2017 Past Surgical History:  
Procedure Laterality Date  COLONOSCOPY,DIAGNOSTIC  11/11/2014  HX CATARACT REMOVAL Bilateral   
 with lens implants  HX HERNIA REPAIR Left  HX ORTHOPAEDIC  2008  
 rods placed in back  HX ORTHOPAEDIC  10/6/15 LEFT L5-S1 MICRODISCECTOMY  HX ORTHOPAEDIC  08/2018  
 glynn replacement, by Dr. Nathalia Oropeza  HX OTHER SURGICAL    
 steroid injection for back pain  HX TONSILLECTOMY  UT EGD INSERT GUIDE WIRE DILATOR PASSAGE ESOPHAGUS  10/17/2013  UT EGD TRANSORAL BIOPSY SINGLE/MULTIPLE  10/17/2013 Social History Socioeconomic History  Marital status:  Spouse name: Not on file  Number of children: Not on file  Years of education: Not on file  Highest education level: Not on file Tobacco Use  Smoking status: Never Smoker  Smokeless tobacco: Never Used Substance and Sexual Activity  Alcohol use: No  
 Drug use: No  
  
Family History Problem Relation Age of Onset  Lung Disease Mother   
     lung cancer  Hypertension Mother  Heart Failure Mother  Hypertension Father  Heart Failure Father Patient Active Problem List  
Diagnosis Code  HNP (herniated nucleus pulposus), lumbar M51.26  
 Constipation K59.00  Hyperkalemia E87.5  Allergic rhinitis J30.9  Herpes zoster dermatitis B02.8, L30.8  Meningitis, viral A87.9  Arthritis of knee, left M17.12  
 Sciatica of left side associated with disorder of lumbosacral spine M53.87  Spinal stenosis M48.00  Hypertension I10  
 Sciatica M54.30  Insomnia G47.00  Prostate cancer screening Z12.5  Osteoarthritis M19.90 Allergies: No Known Allergies Medications:  
Prior to Admission medications Medication Sig Start Date End Date Taking? Authorizing Provider Wheat Dextrin (BENEFIBER CLEAR) 3 gram/3.5 gram pwpk Take 1 Each by mouth daily. Yes Provider, Historical  
polyethylene glycol (MIRALAX) 17 gram packet Take 17 g by mouth daily. Yes Provider, Historical  
amLODIPine (NORVASC) 5 mg tablet Take 1 Tab by mouth daily. 10/30/18  Yes Darin Mcclellan MD  
ALPRAZolam Marlan Grieve) 0.5 mg tablet TAKE 1 TABLET BY MOUTH 3 TIMES DAILY AS NEEDED FOR ANXIETY 10/22/18  Yes Darin Mcclellan MD  
vit C/E/Zn/coppr/lutein/zeaxan (PRESERVISION AREDS 2 PO) Take 1 Tab by mouth daily. Yes Provider, Historical  
gabapentin (NEURONTIN) 300 mg capsule Take 300 mg by mouth three (3) times daily. Yes Provider, Historical  
acetaminophen (TYLENOL) 325 mg tablet Take 650 mg by mouth every four (4) hours as needed for Pain. Yes Provider, Historical  
tiZANidine (ZANAFLEX) 4 mg tablet Take 1 Tab by mouth nightly. 10/31/18   Darin Mcclellan MD  
naloxone [facility]) 4 mg/actuation nasal spray 1 [place] by IntraNASal route as needed (respiratory depression). Give single spray into one nostril. Call 911. Give additional doses every 2 to 3 minutes alternating nostrils until assistance arrives using a new nasal spray with each dose, if patient does not respond or responds and then relapses. 8/7/18   Kristie Paredes NP Physical Exam:  
Vital Signs: Blood pressure 161/67, pulse 69, temperature 98 °F (36.7 °C), resp. rate 16, height 5' 9\" (1.753 m), weight 83.7 kg (184 lb 8 oz), SpO2 100 %. General: well developed, well nourished HEENT: unremarkable Heart: regular rhythm no mumur Lungs: clear Abdominal:  benign Neurological: unremarkable Extremities: no edema Findings/Diagnosis: Change in bowel habits Plan of Care/Planned Procedure: Colonoscopy with conscious/deep sedation Signed: 
Maris Richardson MD 11/29/2018

## 2018-11-29 NOTE — DISCHARGE INSTRUCTIONS
Juan Becerra  [id number]  1935    COLON DISCHARGE INSTRUCTIONS  Discomfort:  Redness at IV site- apply warm compress to area; if redness or soreness persist- contact your physician  There may be a slight amount of blood passed from the rectum  Gaseous discomfort- walking, belching will help relieve any discomfort  You may not operate a vehicle for 12 hours  You may not engage in an occupation involving machinery or appliances for rest of today  You may not drink alcoholic beverages for at least 12 hours  Avoid making any critical decisions for at least 24 hour  DIET:   High fiber diet. - however -  remember your colon is empty and a heavy meal will produce gas. Avoid these foods:  vegetables, fried / greasy foods, carbonated drinks for today  MEDICATION:         ACTIVITY:  You may not resume your normal daily activities until tomorrow AM; it is recommended that you spend the remainder of the day resting -  avoid any strenuous activity. CALL M.D.   ANY SIGN OF:   Increasing pain, nausea, vomiting  Abdominal distension (swelling)  New increased bleeding (oral or rectal)  Fever (chills)  Pain in chest area  Bloody discharge from nose or mouth  Shortness of breath    IMPRESSION:  -Normal terminal ileum  -Sigmoid diverticulosis  -Grade 1 internal hemorrhoids without bleeding  -No colon masses, polyps, or inflammation noted    Follow-up Instructions:   Call Dr. Adri Schmidt if questions arise regarding your procedure  Telephone # [phone]  No repeat colonoscopy indicated    Lina Guidry MD

## 2018-11-29 NOTE — ANESTHESIA POSTPROCEDURE EVALUATION
Procedure(s): 
COLONOSCOPY. Anesthesia Post Evaluation Patient location during evaluation: PACU Note status: Adequate. Level of consciousness: responsive to verbal stimuli and sleepy but conscious Pain management: satisfactory to patient Airway patency: patent Anesthetic complications: no 
Cardiovascular status: acceptable Respiratory status: acceptable Hydration status: acceptable Comments: +Post-Anesthesia Evaluation and Assessment Patient: Montserrat Sigala MRN: [id number]  SSN: [id number] YOB: 1935  Age: 80 y.o. Sex: male Cardiovascular Function/Vital Signs BP (!) 132/93   Pulse 62   Temp 36.7 °C (98 °F)   Resp 14   Ht 5' 9\" (1.753 m)   Wt 83.7 kg (184 lb 8 oz)   SpO2 100%   BMI 27.25 kg/m² Patient is status post Procedure(s): 
COLONOSCOPY. Nausea/Vomiting: Controlled. Postoperative hydration reviewed and adequate. Pain: 
Pain Scale 1: Numeric (0 - 10) (11/29/18 9329) Pain Intensity 1: 0 (11/29/18 0834) Managed. Neurological Status: At baseline. Mental Status and Level of Consciousness: Arousable. Pulmonary Status:  
O2 Device: Room air (11/29/18 0834) Adequate oxygenation and airway patent. Complications related to anesthesia: None Post-anesthesia assessment completed. No concerns. Signed By: Lindsey Mejias MD  
 11/29/2018 Post anesthesia nausea and vomiting:  controlled Visit Vitals BP (!) 132/93 Pulse 62 Temp 36.7 °C (98 °F) Resp 14 Ht 5' 9\" (1.753 m) Wt 83.7 kg (184 lb 8 oz) SpO2 100% BMI 27.25 kg/m²

## 2018-11-29 NOTE — PERIOP NOTES
Anesthesia reports 200mg Propofol, and 500mL NS given during procedure. Received report from anesthesia staff on vital signs and status of patient.

## 2018-11-29 NOTE — PROCEDURES
NAME:  Juana Lindsay   :   1935   MRN:   [id number]     Date/Time:  2018 8:02 AM    Colonoscopy Operative Report    Procedure Type:   Colonoscopy --screening     Indications:     Change in bowel habits  Pre-operative Diagnosis: see indication above  Post-operative Diagnosis:  See findings below  :  Junior Foreman MD  Referring Provider: Binu Wright MD    Exam:  Airway: clear, no airway problems anticipated  Heart: RRR, without gallops or rubs  Lungs: clear bilaterally without wheezes, crackles, or rhonchi  Abdomen: soft, nontender, nondistended, bowel sounds present  Mental Status: awake, alert and oriented to person, place and time    Sedation:  MAC anesthesia Propofol 200mg IV  Procedure Details:  After informed consent was obtained with all risks and benefits of procedure explained and preoperative exam completed, the patient was taken to the endoscopy suite and placed in the left lateral decubitus position. Upon sequential sedation as per above, a digital rectal exam was performed demonstrating internal hemorrhoids. The Olympus videocolonoscope  was inserted in the rectum and carefully advanced to the cecum, which was identified by the ileocecal valve and appendiceal orifice. The distal terminal ileum was evaluated. The quality of preparation was adequate. The colonoscope was slowly withdrawn with careful evaluation between folds. Retroflexion in the rectum was completed demonstrating internal hemorrhoids. Findings:     -Normal terminal ileum  -Sigmoid diverticulosis  -Grade 1 internal hemorrhoids without bleeding  -No colon masses, polyps, or inflammation noted    Specimen Removed:  None. Complications: None. EBL:  None. Impression:    -Normal terminal ileum  -Sigmoid diverticulosis  -Grade 1 internal hemorrhoids without bleeding  -No colon masses, polyps, or inflammation noted    Recommendations: --No further colonoscopy indicated. High fiber diet.   Resume normal medication(s). Discharge Disposition:  Home in the company of a  when able to ambulate.     Duane Zelaya MD

## 2018-11-29 NOTE — ROUTINE PROCESS
Parmjit Parks 
1935 
[id number] Situation: 
Verbal report received from: Juliet Deutsch Procedure: Procedure(s): 
COLONOSCOPY Background: 
 
Preoperative diagnosis: CHANGE IN BOWEL HABITS Postoperative diagnosis: Sigmoid diverticulosis, internal hemorrhoids :  Dr. Dain Bullard Assistant(s): Endoscopy Technician-1: Lorene Primrose Endoscopy RN-1: Zakia Davenport RN Specimens: * No specimens in log * H. Pylori  no Assessment: 
Intra-procedure medications Anesthesia gave intra-procedure sedation and medications, see anesthesia flow sheet yes Intravenous fluids: NS@ Amisha Lint Vital signs stable Abdominal assessment: round and soft Recommendation: 
Discharge patient per MD order. Family or Friend Permission to share finding with family or friend yes

## 2018-11-29 NOTE — ANESTHESIA PREPROCEDURE EVALUATION
Anesthetic History No history of anesthetic complications Review of Systems / Medical History Patient summary reviewed, nursing notes reviewed and pertinent labs reviewed Pulmonary Within defined limits Neuro/Psych Within defined limits Cardiovascular Hypertension Exercise tolerance: >4 METS 
  
GI/Hepatic/Renal 
Within defined limits Endo/Other Arthritis Other Findings Comments: Change in bowel habits Lumbago Anesthetic History No history of anesthetic complications Review of Systems / Medical History Patient summary reviewed, nursing notes reviewed and pertinent labs reviewed Pulmonary Within defined limits Neuro/Psych Within defined limits Cardiovascular Within defined limits Exercise tolerance: >4 METS 
  
GI/Hepatic/Renal 
Within defined limits Endo/Other Within defined limits Other Findings Physical Exam 
 
Airway Mallampati: II 
TM Distance: 4 - 6 cm Neck ROM: normal range of motion Mouth opening: Normal 
 
 Cardiovascular Regular rate and rhythm,  S1 and S2 normal,  no murmur, click, rub, or gallop Dental 
No notable dental hx Pulmonary Breath sounds clear to auscultation Abdominal 
GI exam deferred Other Findings Anesthetic Plan ASA: 2 Anesthesia type: general 
 
 
 
 
Induction: Intravenous Anesthetic plan and risks discussed with: Patient Physical Exam 
 
Airway Mallampati: II 
TM Distance: 4 - 6 cm Neck ROM: normal range of motion Mouth opening: Normal 
 
 Cardiovascular Regular rate and rhythm,  S1 and S2 normal,  no murmur, click, rub, or gallop Dental 
No notable dental hx Pulmonary Breath sounds clear to auscultation Abdominal 
GI exam deferred Other Findings Anesthetic Plan ASA: 2 Anesthesia type: general 
 
Monitoring Plan: BIS Induction: Intravenous Anesthetic plan and risks discussed with: Patient

## 2018-12-13 ENCOUNTER — OFFICE VISIT (OUTPATIENT)
Dept: INTERNAL MEDICINE CLINIC | Age: 83
End: 2018-12-13

## 2018-12-13 VITALS
OXYGEN SATURATION: 98 % | WEIGHT: 187.4 LBS | TEMPERATURE: 97.6 F | HEIGHT: 69 IN | BODY MASS INDEX: 27.76 KG/M2 | SYSTOLIC BLOOD PRESSURE: 175 MMHG | RESPIRATION RATE: 20 BRPM | HEART RATE: 79 BPM | DIASTOLIC BLOOD PRESSURE: 72 MMHG

## 2018-12-13 DIAGNOSIS — K59.00 CONSTIPATION, UNSPECIFIED CONSTIPATION TYPE: ICD-10-CM

## 2018-12-13 DIAGNOSIS — R53.83 FATIGUE, UNSPECIFIED TYPE: ICD-10-CM

## 2018-12-13 DIAGNOSIS — E78.00 PURE HYPERCHOLESTEROLEMIA: ICD-10-CM

## 2018-12-13 DIAGNOSIS — I10 ESSENTIAL HYPERTENSION: Primary | ICD-10-CM

## 2018-12-13 DIAGNOSIS — M48.061 SPINAL STENOSIS OF LUMBAR REGION, UNSPECIFIED WHETHER NEUROGENIC CLAUDICATION PRESENT: ICD-10-CM

## 2018-12-13 PROBLEM — M54.50 ACUTE LEFT-SIDED LOW BACK PAIN WITHOUT SCIATICA: Status: ACTIVE | Noted: 2018-12-13

## 2018-12-13 NOTE — PATIENT INSTRUCTIONS
Chief Complaint   Patient presents with    Abdominal Pain     pain after colonoscopy    Back Pain     left side of back between ribs & hip      1. Have you been to the ER, urgent care clinic since your last visit? yes Hospitalized since your last visit? yes    2. Have you seen or consulted any other health care providers outside of the [address] [place] [address] since your last visit?   No

## 2018-12-14 ENCOUNTER — APPOINTMENT (OUTPATIENT)
Dept: INTERNAL MEDICINE CLINIC | Age: 83
End: 2018-12-14

## 2018-12-14 DIAGNOSIS — I10 ESSENTIAL HYPERTENSION: ICD-10-CM

## 2018-12-14 DIAGNOSIS — E78.00 PURE HYPERCHOLESTEROLEMIA: ICD-10-CM

## 2018-12-14 DIAGNOSIS — R53.83 FATIGUE, UNSPECIFIED TYPE: ICD-10-CM

## 2018-12-15 LAB
ALBUMIN SERPL-MCNC: 4.3 G/DL (ref 3.5–4.7)
ALBUMIN/GLOB SERPL: 2 {RATIO} (ref 1.2–2.2)
ALP SERPL-CCNC: 69 IU/L (ref 39–117)
ALT SERPL-CCNC: 10 IU/L (ref 0–44)
APPEARANCE UR: CLEAR
AST SERPL-CCNC: 16 IU/L (ref 0–40)
BILIRUB SERPL-MCNC: 0.5 MG/DL (ref 0–1.2)
BILIRUB UR QL STRIP: NEGATIVE
BUN SERPL-MCNC: 15 MG/DL (ref 8–27)
BUN/CREAT SERPL: 20 (ref 10–24)
CALCIUM SERPL-MCNC: 9.3 MG/DL (ref 8.6–10.2)
CHLORIDE SERPL-SCNC: 104 MMOL/L (ref 96–106)
CHOLEST SERPL-MCNC: 183 MG/DL (ref 100–199)
CO2 SERPL-SCNC: 27 MMOL/L (ref 20–29)
COLOR UR: YELLOW
CREAT SERPL-MCNC: 0.76 MG/DL (ref 0.76–1.27)
ERYTHROCYTE [DISTWIDTH] IN BLOOD BY AUTOMATED COUNT: 13.7 % (ref 12.3–15.4)
GLOBULIN SER CALC-MCNC: 2.2 G/DL (ref 1.5–4.5)
GLUCOSE SERPL-MCNC: 95 MG/DL (ref 65–99)
GLUCOSE UR QL: NEGATIVE
HCT VFR BLD AUTO: 39.7 % (ref 37.5–51)
HDLC SERPL-MCNC: 50 MG/DL
HGB BLD-MCNC: 13.5 G/DL (ref 13–17.7)
HGB UR QL STRIP: NEGATIVE
KETONES UR QL STRIP: NEGATIVE
LDLC SERPL CALC-MCNC: 119 MG/DL (ref 0–99)
LEUKOCYTE ESTERASE UR QL STRIP: NEGATIVE
MCH RBC QN AUTO: 30.7 PG (ref 26.6–33)
MCHC RBC AUTO-ENTMCNC: 34 G/DL (ref 31.5–35.7)
MCV RBC AUTO: 90 FL (ref 79–97)
MICRO URNS: NORMAL
NITRITE UR QL STRIP: NEGATIVE
PH UR STRIP: 7.5 [PH] (ref 5–7.5)
PLATELET # BLD AUTO: 205 X10E3/UL (ref 150–379)
POTASSIUM SERPL-SCNC: 4.6 MMOL/L (ref 3.5–5.2)
PROT SERPL-MCNC: 6.5 G/DL (ref 6–8.5)
PROT UR QL STRIP: NEGATIVE
RBC # BLD AUTO: 4.4 X10E6/UL (ref 4.14–5.8)
SODIUM SERPL-SCNC: 143 MMOL/L (ref 134–144)
SP GR UR: 1.01 (ref 1–1.03)
TRIGL SERPL-MCNC: 71 MG/DL (ref 0–149)
UROBILINOGEN UR STRIP-MCNC: 0.2 MG/DL (ref 0.2–1)
VLDLC SERPL CALC-MCNC: 14 MG/DL (ref 5–40)
WBC # BLD AUTO: 5.3 X10E3/UL (ref 3.4–10.8)

## 2018-12-20 ENCOUNTER — OFFICE VISIT (OUTPATIENT)
Dept: INTERNAL MEDICINE CLINIC | Age: 83
End: 2018-12-20

## 2018-12-20 VITALS
HEART RATE: 79 BPM | DIASTOLIC BLOOD PRESSURE: 78 MMHG | RESPIRATION RATE: 14 BRPM | HEIGHT: 69 IN | WEIGHT: 187.6 LBS | SYSTOLIC BLOOD PRESSURE: 142 MMHG | OXYGEN SATURATION: 98 % | BODY MASS INDEX: 27.78 KG/M2

## 2018-12-20 DIAGNOSIS — Z13.1 SCREENING FOR DIABETES MELLITUS: ICD-10-CM

## 2018-12-20 DIAGNOSIS — Z13.31 SCREENING FOR DEPRESSION: ICD-10-CM

## 2018-12-20 DIAGNOSIS — I10 ESSENTIAL HYPERTENSION: ICD-10-CM

## 2018-12-20 DIAGNOSIS — Z13.6 SCREENING FOR ISCHEMIC HEART DISEASE: ICD-10-CM

## 2018-12-20 DIAGNOSIS — Z00.00 MEDICARE ANNUAL WELLNESS VISIT, SUBSEQUENT: Primary | ICD-10-CM

## 2018-12-20 DIAGNOSIS — Z13.39 SCREENING FOR ALCOHOLISM: ICD-10-CM

## 2018-12-20 NOTE — PATIENT INSTRUCTIONS
Medicare Wellness Visit, Male    The best way to live healthy is to have a lifestyle where you eat a well-balanced diet, exercise regularly, limit alcohol use, and quit all forms of tobacco/nicotine, if applicable. Regular preventive services are another way to keep healthy. Preventive services (vaccines, screening tests, monitoring & exams) can help personalize your care plan, which helps you manage your own care. Screening tests can find health problems at the earliest stages, when they are easiest to treat. [facility] follows the current, evidence-based guidelines published by the [facility] ([facility]) when recommending preventive services for our patients. Because we follow these guidelines, sometimes recommendations change over time as research supports it. (For example, a prostate screening blood test is no longer routinely recommended for men with no symptoms.)  Of course, you and your doctor may decide to screen more often for some diseases, based on your risk and co-morbidities (chronic disease you are already diagnosed with). Preventive services for you include:  - Medicare offers their members a free annual wellness visit, which is time for you and your primary care provider to discuss and plan for your preventive service needs. Take advantage of this benefit every year!  -All adults over age 72 should receive the recommended pneumonia vaccines. Current USPSTF guidelines recommend a series of two vaccines for the best pneumonia protection.   -All adults should have a flu vaccine yearly and an ECG.  All adults age 61 and older should receive a shingles vaccine once in their lifetime.    -All adults age 38-68 who are overweight should have a diabetes screening test once every three years.   -Other screening tests & preventive services for persons with diabetes include: an eye exam to screen for diabetic retinopathy, a kidney function test, a foot exam, and stricter control over your cholesterol.   -Cardiovascular screening for adults with routine risk involves an electrocardiogram (ECG) at intervals determined by the provider.   -Colorectal cancer screening should be done for adults age 54-65 with no increased risk factors for colorectal cancer. There are a number of acceptable methods of screening for this type of cancer. Each test has its own benefits and drawbacks. Discuss with your provider what is most appropriate for you during your annual wellness visit. The different tests include: colonoscopy (considered the best screening method), a fecal occult blood test, a fecal DNA test, and sigmoidoscopy.  -All adults born between [facility] should be screened once for Hepatitis C.  -An Abdominal Aortic Aneurysm (AAA) Screening is recommended for men age 73-68 who has ever smoked in their lifetime. Here is a list of your current Health Maintenance items (your personalized list of preventive services) with a due date:  Health Maintenance Due   Topic Date Due    DTaP/Tdap/Td  (1 - Tdap) 05/09/1956    Shingles Vaccine (1 of 2) 05/09/1985    Glaucoma Screening   05/09/2000    Pneumococcal Vaccine (1 of 2 - PCV13) 05/09/2000    Annual Well Visit  03/14/2018    Flu Vaccine  08/01/2018     Beneficiaries Diagnosed with Pre-Diabetes  Medicare provides coverage for a maximum of 2 diabetes screening tests within a 12-month period (but not less than 6 months apart) for beneficiaries diagnosed with pre-diabetes. Beneficiaries Previously Tested but not Diagnosed as Pre-Diabetic or Who Have Never Been Tested  Medicare provides coverage for 1 diabetes screening test within a 12-month period (i.e., at least 11 months have passed following the month in which the last Medicare-covered diabetes screening test was performed) for beneficiaries who were previously tested and were not diagnosed with pre-diabetes, or who have never been tested.     Risk Factors  To be eligible for the diabetes screening tests, beneficiaries must have any of the following risk factors:   Hypertension,   Dyslipidemia,   Obesity (a body mass index greater than or equal to 30 kg/m), or   Previous identification of an elevated impaired fasting glucose or glucose tolerance. OR  At least two of the following characteristics:   Overweight (a body mass index greater than 25 but less than 30 kg/m),   Family history of diabetes,   Aged 72 years and older, or   A history of gestational diabetes mellitus or delivery of a baby weighing greater than 9 pounds. Body Mass Index: Care Instructions  Your Care Instructions    Body mass index (BMI) can help you see if your weight is raising your risk for health problems. It uses a formula to compare how much you weigh with how tall you are. · A BMI lower than 18.5 is considered underweight. · A BMI between 18.5 and 24.9 is considered healthy. · A BMI between 25 and 29.9 is considered overweight. A BMI of 30 or higher is considered obese. If your BMI is in the normal range, it means that you have a lower risk for weight-related health problems. If your BMI is in the overweight or obese range, you may be at increased risk for weight-related health problems, such as high blood pressure, heart disease, stroke, arthritis or joint pain, and diabetes. If your BMI is in the underweight range, you may be at increased risk for health problems such as fatigue, lower protection (immunity) against illness, muscle loss, bone loss, hair loss, and hormone problems. BMI is just one measure of your risk for weight-related health problems. You may be at higher risk for health problems if you are not active, you eat an unhealthy diet, or you drink too much alcohol or use tobacco products. Follow-up care is a key part of your treatment and safety. Be sure to make and go to all appointments, and call your doctor if you are having problems.  It's also a good idea to know your test results and keep a list of the medicines you take. How can you care for yourself at home? · Practice healthy eating habits. This includes eating plenty of fruits, vegetables, whole grains, lean protein, and low-fat dairy. · If your doctor recommends it, get more exercise. Walking is a good choice. Bit by bit, increase the amount you walk every day. Try for at least 30 minutes on most days of the week. · Do not smoke. Smoking can increase your risk for health problems. If you need help quitting, talk to your doctor about stop-smoking programs and medicines. These can increase your chances of quitting for good. · Limit alcohol to 2 drinks a day for men and 1 drink a day for women. Too much alcohol can cause health problems. If you have a BMI higher than 25  · Your doctor may do other tests to check your risk for weight-related health problems. This may include measuring the distance around your waist. A waist measurement of more than 40 inches in men or 35 inches in women can increase the risk of weight-related health problems. · Talk with your doctor about steps you can take to stay healthy or improve your health. You may need to make lifestyle changes to lose weight and stay healthy, such as changing your diet and getting regular exercise. If you have a BMI lower than 18.5  · Your doctor may do other tests to check your risk for health problems. · Talk with your doctor about steps you can take to stay healthy or improve your health. You may need to make lifestyle changes to gain or maintain weight and stay healthy, such as getting more healthy foods in your diet and doing exercises to build muscle. Where can you learn more? Go to http://lillian-kirk.info/. Enter S176 in the search box to learn more about \"Body Mass Index: Care Instructions. \"  Current as of: October 13, 2016  Content Version: 11.4  © 0409-4930 [employer].  Care instructions adapted under license by [employer] (which disclaims liability or warranty for this information). If you have questions about a medical condition or this instruction, always ask your healthcare professional. [address] any warranty or liability for your use of this information.

## 2018-12-20 NOTE — PROGRESS NOTES
AP VIEW CHEST:

 

HISTORY: 

Cardiac palpitations

 

FINDINGS: 

AP chest was obtained on 2/15/18.

 

AP view chest demonstrates the lungs to be well aerated.  No evidence of active intrathoracic disease
 is seen.  No evidence of effusions, pneumonia, or pneumothorax seen.

 

IMPRESSION: 

Unremarkable AP view chest.

 

POS: [facility] This is the Subsequent Medicare Annual Wellness Exam, performed 12 months or more after the Initial AWV or the last Subsequent AWV    I have reviewed the patient's medical history in detail and updated the computerized patient record. History     Past Medical History:   Diagnosis Date    Allergic rhinitis 12/15/2017    Arthritis of knee, left 12/15/2017    Aseptic meningitis     Chronic pain     chronic back pain    Colon polyps     Constipation 12/15/2017    Dysphagia 12/15/2017    Elevated blood pressure reading 12/15/2017    Fatigue 12/15/2017    Headache 12/15/2017    Hematuria 12/15/2017    Herpes zoster dermatitis 12/15/2017    Hyperkalemia 12/15/2017    Hypertension 12/15/2017    Insomnia 12/15/2017    Osteoarthritis 12/15/2017    Otitis externa 12/15/2017    Prostate cancer screening 12/15/2017    Right groin hernia     Sciatica 12/15/2017    Sciatica of left side associated with disorder of lumbosacral spine 12/15/2017    Shingles     Spinal stenosis 12/15/2017      Past Surgical History:   Procedure Laterality Date    COLONOSCOPY N/A 11/29/2018    [facility]  11/11/2014         COLONOSCOPY,DIAGNOSTIC  11/29/2018         HX CATARACT REMOVAL Bilateral     with lens implants    HX COLONOSCOPY      HX HERNIA REPAIR Left     HX ORTHOPAEDIC  2008    rods placed in back    HX ORTHOPAEDIC  10/6/15    LEFT L5-S1 MICRODISCECTOMY     HX ORTHOPAEDIC  08/2018    glynn replacement, by Dr. [facility]      steroid injection for back pain    HX TONSILLECTOMY      [place] EGD INSERT GUIDE WIRE DILATOR PASSAGE ESOPHAGUS  10/17/2013         [place] EGD TRANSORAL BIOPSY SINGLE/MULTIPLE  10/17/2013          Current Outpatient Medications   Medication Sig Dispense Refill    Wheat Dextrin (BENEFIBER CLEAR) 3 gram/3.5 gram pwpk Take 1 Each by mouth daily.       polyethylene glycol (MIRALAX) 17 gram packet Take 17 g by mouth daily.  tiZANidine (ZANAFLEX) 4 mg tablet Take 1 Tab by mouth nightly. 90 Tab 3    amLODIPine (NORVASC) 5 mg tablet Take 1 Tab by mouth daily. 90 Tab 3    ALPRAZolam (XANAX) 0.5 mg tablet TAKE 1 TABLET BY MOUTH 3 TIMES DAILY AS NEEDED FOR ANXIETY 90 Tab 0    naloxone (NARCAN) 4 mg/actuation nasal spray 1 [place] by IntraNASal route as needed (respiratory depression). Give single spray into one nostril. Call 911. Give additional doses every 2 to 3 minutes alternating nostrils until assistance arrives using a new nasal spray with each dose, if patient does not respond or responds and then relapses. 1 Each 0    vit C/E/Zn/coppr/lutein/zeaxan (PRESERVISION AREDS 2 PO) Take 1 Tab by mouth daily.  gabapentin (NEURONTIN) 300 mg capsule Take 300 mg by mouth three (3) times daily.  acetaminophen (TYLENOL) 325 mg tablet Take 650 mg by mouth every four (4) hours as needed for Pain.        No Known Allergies  Family History   Problem Relation Age of Onset    Lung Disease Mother         lung cancer    Hypertension Mother     Heart Failure Mother     Hypertension Father     Heart Failure Father      Social History     Tobacco Use    Smoking status: Never Smoker    Smokeless tobacco: Never Used   Substance Use Topics    Alcohol use: No     Patient Active Problem List   Diagnosis Code    HNP (herniated nucleus pulposus), lumbar M51.26    Constipation K59.00    Hyperkalemia E87.5    Allergic rhinitis J30.9    Herpes zoster dermatitis B02.8, L30.8    Meningitis, viral A87.9    Arthritis of knee, left M17.12    Sciatica of left side associated with disorder of lumbosacral spine M53.87    Spinal stenosis M48.00    Hypertension I10    Sciatica M54.30    Insomnia G47.00    Prostate cancer screening Z12.5    Osteoarthritis M19.90    Acute left-sided low back pain without sciatica M54.5       Depression Risk Factor Screening:     PHQ over the last two weeks 12/20/2018   Little interest or pleasure in doing things Not at all   Feeling down, depressed, irritable, or hopeless Not at all   Total Score PHQ 2 0     Alcohol Risk Factor Screening: You do not drink alcohol or very rarely. Functional Ability and Level of Safety:   Hearing Loss  Wears hearing aids. Activities of Daily Living  The home contains: no safety equipment. Patient does total self care    Fall Risk  Fall Risk Assessment, last 12 mths 12/20/2018   Able to walk? Yes   Fall in past 12 months? No       Abuse Screen  Patient is not abused    Cognitive Screening   Evaluation of Cognitive Function:  Has your family/caregiver stated any concerns about your memory: no  Normal    Patient Care Team   Patient Care Team:  Yadira Ibarra MD as PCP - General (Internal Medicine)  Nehemias Dutta MD as Surgeon (General Surgery)  Deon Noel MD (Orthopedic Surgery)    Assessment/Plan   Education and counseling provided:  Are appropriate based on today's review and evaluation    Diagnoses and all orders for this visit:    1. Medicare annual wellness visit, subsequent    2. Screening for alcoholism  -     [place] ANNUAL ALCOHOL SCREEN 15 MIN    3. Screening for depression  -     DEPRESSION SCREEN ANNUAL    4. Screening for diabetes mellitus    5. Screening for ischemic heart disease    6. Essential hypertension        Health Maintenance Due   Topic Date Due    DTaP/Tdap/Td series (1 - Tdap) 05/09/1956    Shingrix Vaccine Age 50> (1 of 2) 05/09/1985    GLAUCOMA SCREENING Q2Y  05/09/2000    Pneumococcal 65+ Low/Medium Risk (1 of 2 - PCV13) 05/09/2000    MEDICARE YEARLY EXAM  03/14/2018    Influenza Age 9 to Adult  08/01/2018     This note will not be viewable in [address] Layne Valdovinos is a 80 y.o. male and presents with Hypertension (6 month f/u) and Constipation  . Subjective:  Mr. Edis Brock presents today for follow-up of hypertension.   He did have a recent colonoscopy and had some residual symptoms after this which are improving. He remains on antihypertensive therapy and is tolerating his medication well. He said no headache, palpitations, chest pain, PND, orthopnea, or pedal edema. He takes Neurontin for chronic back pain and has seen orthopedics for this previously. He is status post lumbar laminectomy.   Past Medical History:   Diagnosis Date    Allergic rhinitis 12/15/2017    Arthritis of knee, left 12/15/2017    Aseptic meningitis     Chronic pain     chronic back pain    Colon polyps     Constipation 12/15/2017    Dysphagia 12/15/2017    Elevated blood pressure reading 12/15/2017    Fatigue 12/15/2017    Headache 12/15/2017    Hematuria 12/15/2017    Herpes zoster dermatitis 12/15/2017    Hyperkalemia 12/15/2017    Hypertension 12/15/2017    Insomnia 12/15/2017    Osteoarthritis 12/15/2017    Otitis externa 12/15/2017    Prostate cancer screening 12/15/2017    Right groin hernia     Sciatica 12/15/2017    Sciatica of left side associated with disorder of lumbosacral spine 12/15/2017    Shingles     Spinal stenosis 12/15/2017     Past Surgical History:   Procedure Laterality Date    COLONOSCOPY N/A 11/29/2018    [facility]  11/11/2014         COLONOSCOPY,DIAGNOSTIC  11/29/2018         HX CATARACT REMOVAL Bilateral     with lens implants    HX COLONOSCOPY      HX HERNIA REPAIR Left     HX ORTHOPAEDIC  2008    rods placed in back    HX ORTHOPAEDIC  10/6/15    LEFT L5-S1 MICRODISCECTOMY     HX ORTHOPAEDIC  08/2018    glynn replacement, by Dr. Nicoletta Aschoff      steroid injection for back pain    HX TONSILLECTOMY      [place] EGD INSERT GUIDE WIRE DILATOR PASSAGE ESOPHAGUS  10/17/2013         [place] EGD TRANSORAL BIOPSY SINGLE/MULTIPLE  10/17/2013          No Known Allergies  Current Outpatient Medications   Medication Sig Dispense Refill    Wheat Dextrin (BENEFIBER CLEAR) 3 gram/3.5 gram pwpk Take 1 Each by mouth daily.      polyethylene glycol (MIRALAX) 17 gram packet Take 17 g by mouth daily.  tiZANidine (ZANAFLEX) 4 mg tablet Take 1 Tab by mouth nightly. 90 Tab 3    amLODIPine (NORVASC) 5 mg tablet Take 1 Tab by mouth daily. 90 Tab 3    ALPRAZolam (XANAX) 0.5 mg tablet TAKE 1 TABLET BY MOUTH 3 TIMES DAILY AS NEEDED FOR ANXIETY 90 Tab 0    naloxone (NARCAN) 4 mg/actuation nasal spray 1 [place] by IntraNASal route as needed (respiratory depression). Give single spray into one nostril. Call 911. Give additional doses every 2 to 3 minutes alternating nostrils until assistance arrives using a new nasal spray with each dose, if patient does not respond or responds and then relapses. 1 Each 0    vit C/E/Zn/coppr/lutein/zeaxan (PRESERVISION AREDS 2 PO) Take 1 Tab by mouth daily.  gabapentin (NEURONTIN) 300 mg capsule Take 300 mg by mouth three (3) times daily.  acetaminophen (TYLENOL) 325 mg tablet Take 650 mg by mouth every four (4) hours as needed for Pain. Social History     Socioeconomic History    Marital status:      Spouse name: Not on file    Number of children: Not on file    Years of education: Not on file    Highest education level: Not on file   Tobacco Use    Smoking status: Never Smoker    Smokeless tobacco: Never Used   Substance and Sexual Activity    Alcohol use: No    Drug use: No     Family History   Problem Relation Age of Onset    Lung Disease Mother         lung cancer    Hypertension Mother     Heart Failure Mother     Hypertension Father     Heart Failure Father        Review of Systems  Constitutional:  negative for fevers, chills, anorexia and weight loss  Eyes:    negative for visual disturbance and irritation  ENT:    negative for tinnitus,sore throat,nasal congestion,ear pains. hoarseness  Respiratory:     negative for cough, hemoptysis, dyspnea,wheezing  CV:    negative for chest pain, palpitations, lower extremity edema  GI:    negative for nausea, vomiting, diarrhea, abdominal pain,melena  Endo:               negative for polyuria,polydipsia,polyphagia,heat intolerance  Genitourinary : negative for frequency, dysuria and hematuria  Integumentary: negative for rash and pruritus  Hematologic:   negative for easy bruising and gum/nose bleeding  Musculoskel:  negative for myalgias, arthralgias, back pain, muscle weakness, joint pain  Neurological:   negative for headaches, dizziness, vertigo, memory problems and gait   Behavl/Psych:  negative for feelings of anxiety, depression, mood changes  ROS otherwise negative      Objective:  Visit Vitals  /78 (BP 1 Location: Left arm, BP Patient Position: Sitting)   Pulse 79   Resp 14   Ht 5' 9\" (1.753 m)   Wt 187 lb 9.6 oz (85.1 kg)   SpO2 98%   BMI 27.70 kg/m²     Physical Exam:   General appearance - alert, well appearing, and in no distress  Mental status - alert, oriented to person, place, and time  EYE-TRACY, EOMI, fundi normal, corneas normal, no foreign bodies  ENT-ENT exam normal, no neck nodes or sinus tenderness  Nose - normal and patent, no erythema, discharge or polyps  Mouth - mucous membranes moist, pharynx normal without lesions  Neck - supple, no significant adenopathy   Chest - clear to auscultation, no wheezes, rales or rhonchi, symmetric air entry   Heart - normal rate, regular rhythm, normal S1, S2, no murmurs, rubs, clicks or gallops   Abdomen - soft, nontender, nondistended, no masses or organomegaly  Lymph- no adenopathy palpable  Ext-peripheral pulses normal, no pedal edema, no clubbing or cyanosis  Skin-Warm and dry. no hyperpigmentation, vitiligo, or suspicious lesions  Neuro -alert, oriented, normal speech, no focal findings or movement disorder noted      Assessment/Plan:  Diagnoses and all orders for this visit:    1. Medicare annual wellness visit, subsequent    2.  Screening for alcoholism  -     [place] ANNUAL ALCOHOL SCREEN 15 MIN    3. Screening for depression  -     DEPRESSION SCREEN ANNUAL    4. Screening for diabetes mellitus    5. Screening for ischemic heart disease    6. Essential hypertension          ICD-10-CM ICD-9-CM    1. Medicare annual wellness visit, subsequent Z00.00 V70.0    2. Screening for alcoholism Z13.39 V79.1 [place] ANNUAL ALCOHOL SCREEN 15 MIN   3. Screening for depression Z13.31 V79.0 DEPRESSION SCREEN ANNUAL   4. Screening for diabetes mellitus Z13.1 V77.1    5. Screening for ischemic heart disease Z13.6 V81.0    6. Essential hypertension I10 401.9      Plan:    Hypertension currently well controlled. Reviewed patient's labs with him today which she had prior to his visit. Cholesterol is excellent and glucose is normal.    Follow-up Disposition:  Return in about 6 months (around 6/20/2019) for follow up. I have reviewed with the patient details of the assessment and plan and all questions were answered. Relevent patient education was performed. Verbal and/or written instructions (see AVS) provided. The most recent lab findings were reviewed with the patient. Plan was discussed with patient who verbally expressed understanding. An After Visit Summary was printed and given to the patient. Gal Correa MD        Discussed the patient's BMI with him. The BMI follow up plan is as follows:     dietary management education, guidance, and counseling  encourage exercise  monitor weight  prescribed dietary intake    An After Visit Summary was printed and given to the patient.

## 2018-12-20 NOTE — PROGRESS NOTES
Chief Complaint   Patient presents with    Hypertension     6 month f/u    Constipation           1. Have you been to the ER, urgent care clinic since your last visit? Hospitalized since your last visit? no    2. Have you seen or consulted any other health care providers outside of the [facility] since your last visit? Include any pap smears or colon screening. No    Refused flu shot.

## 2019-01-16 DIAGNOSIS — F41.9 ANXIETY: ICD-10-CM

## 2019-01-16 RX ORDER — ALPRAZOLAM 0.5 MG/1
TABLET ORAL
Qty: 90 TAB | Refills: 0 | Status: SHIPPED | OUTPATIENT
Start: 2019-01-16 | End: 2019-04-02 | Stop reason: SDUPTHER

## 2019-01-16 NOTE — TELEPHONE ENCOUNTER
Requested Prescriptions     Pending Prescriptions Disp Refills    ALPRAZolam (XANAX) 0.5 mg tablet 90 Tab 0     Sig: TAKE 1 TABLET BY MOUTH 3 TIMES DAILY AS NEEDED FOR ANXIETY       Last Refill: 10/22/18  Next Appointment:none

## 2019-04-02 DIAGNOSIS — F41.9 ANXIETY: ICD-10-CM

## 2019-04-03 RX ORDER — ALPRAZOLAM 0.5 MG/1
TABLET ORAL
Qty: 90 TAB | Refills: 0 | Status: SHIPPED | OUTPATIENT
Start: 2019-04-03 | End: 2019-07-01 | Stop reason: SDUPTHER

## 2019-04-03 NOTE — TELEPHONE ENCOUNTER
Last Refill: 1/16/2019  Last visit:12/20/2018    Requested Prescriptions     Pending Prescriptions Disp Refills    ALPRAZolam (XANAX) 0.5 mg tablet [Pharmacy Med Name: ALPRAZOLAM 0.5 MG TABLET] 90 Tab 0     Sig: TAKE 1 TABLET BY MOUTH 3 TIMES DAILY AS NEEDED FOR ANXIETY

## 2019-05-20 ENCOUNTER — OFFICE VISIT (OUTPATIENT)
Dept: SURGERY | Age: 84
End: 2019-05-20

## 2019-05-20 VITALS
DIASTOLIC BLOOD PRESSURE: 64 MMHG | BODY MASS INDEX: 27.28 KG/M2 | WEIGHT: 184.2 LBS | TEMPERATURE: 97.7 F | HEART RATE: 72 BPM | OXYGEN SATURATION: 98 % | SYSTOLIC BLOOD PRESSURE: 143 MMHG | RESPIRATION RATE: 15 BRPM | HEIGHT: 69 IN

## 2019-05-20 DIAGNOSIS — N40.0 BENIGN PROSTATIC HYPERPLASIA, UNSPECIFIED WHETHER LOWER URINARY TRACT SYMPTOMS PRESENT: ICD-10-CM

## 2019-05-20 DIAGNOSIS — K40.90 RIGHT INGUINAL HERNIA: Primary | ICD-10-CM

## 2019-05-20 RX ORDER — TAMSULOSIN HYDROCHLORIDE 0.4 MG/1
0.4 CAPSULE ORAL DAILY
Qty: 14 CAP | Refills: 0 | Status: SHIPPED | OUTPATIENT
Start: 2019-05-20 | End: 2019-05-20

## 2019-05-20 RX ORDER — TAMSULOSIN HYDROCHLORIDE 0.4 MG/1
0.4 CAPSULE ORAL DAILY
Qty: 14 CAP | Refills: 0 | Status: SHIPPED | OUTPATIENT
Start: 2019-05-20 | End: 2019-06-03

## 2019-05-20 NOTE — Clinical Note
6/4/19 Patient: Hue Camacho YOB: 1935 Date of Visit: 5/20/2019 Jeri Koenig MD 
[facility] [id number] [facility] [phone] VIA In Basket Dear Jeri Koenig MD, Thank you for referring Mr. Hue Camacho to [address] for evaluation. My notes for this consultation are attached. If you have questions, please do not hesitate to call me. I look forward to following your patient along with you.  
 
 
Sincerely, 
 
Teresa Correa MD

## 2019-05-20 NOTE — PROGRESS NOTES
Chief Complaint   Patient presents with    Inguinal Hernia     Re-evaluate inguinal hernia, last seen 7/2/18     1. Have you been to the ER, urgent care clinic since your last visit? Hospitalized since your last visit? No    2. Have you seen or consulted any other health care providers outside of the [address] [place] [address] since your last visit? Include any pap smears or colon screening.  No

## 2019-05-29 ENCOUNTER — HOSPITAL ENCOUNTER (OUTPATIENT)
Dept: PREADMISSION TESTING | Age: 84
Discharge: HOME | End: 2019-05-29
Payer: MEDICARE

## 2019-05-29 VITALS
HEART RATE: 67 BPM | RESPIRATION RATE: 16 BRPM | TEMPERATURE: 97.9 F | OXYGEN SATURATION: 100 % | SYSTOLIC BLOOD PRESSURE: 159 MMHG | BODY MASS INDEX: 26.29 KG/M2 | WEIGHT: 177.47 LBS | HEIGHT: 69 IN | DIASTOLIC BLOOD PRESSURE: 70 MMHG

## 2019-05-29 LAB
ANION GAP SERPL CALC-SCNC: 0 MMOL/L (ref 5–15)
BUN SERPL-MCNC: 11 MG/DL (ref 6–20)
BUN/CREAT SERPL: 13 (ref 12–20)
CALCIUM SERPL-MCNC: 9 MG/DL (ref 8.5–10.1)
CHLORIDE SERPL-SCNC: 109 MMOL/L (ref 97–108)
CO2 SERPL-SCNC: 33 MMOL/L (ref 21–32)
CREAT SERPL-MCNC: 0.82 MG/DL (ref 0.7–1.3)
ERYTHROCYTE [DISTWIDTH] IN BLOOD BY AUTOMATED COUNT: 12.9 % (ref 11.5–14.5)
GLUCOSE SERPL-MCNC: 101 MG/DL (ref 65–100)
HCT VFR BLD AUTO: 41.2 % (ref 36.6–50.3)
HGB BLD-MCNC: 13.3 G/DL (ref 12.1–17)
MCH RBC QN AUTO: 31.2 PG (ref 26–34)
MCHC RBC AUTO-ENTMCNC: 32.3 G/DL (ref 30–36.5)
MCV RBC AUTO: 96.7 FL (ref 80–99)
NRBC # BLD: 0 K/UL (ref 0–0.01)
NRBC BLD-RTO: 0 PER 100 WBC
PLATELET # BLD AUTO: 186 K/UL (ref 150–400)
PMV BLD AUTO: 9.7 FL (ref 8.9–12.9)
POTASSIUM SERPL-SCNC: 4.9 MMOL/L (ref 3.5–5.1)
RBC # BLD AUTO: 4.26 M/UL (ref 4.1–5.7)
SODIUM SERPL-SCNC: 142 MMOL/L (ref 136–145)
WBC # BLD AUTO: 5.8 K/UL (ref 4.1–11.1)

## 2019-05-29 PROCEDURE — 36415 COLL VENOUS BLD VENIPUNCTURE: CPT

## 2019-05-29 PROCEDURE — 80048 BASIC METABOLIC PNL TOTAL CA: CPT

## 2019-05-29 PROCEDURE — 85027 COMPLETE CBC AUTOMATED: CPT

## 2019-05-29 RX ORDER — ACETAMINOPHEN 325 MG/1
650 TABLET ORAL
COMMUNITY
End: 2019-06-27 | Stop reason: SDUPTHER

## 2019-05-29 RX ORDER — CEFAZOLIN SODIUM/WATER 2 G/20 ML
2 SYRINGE (ML) INTRAVENOUS ONCE
Status: CANCELLED | OUTPATIENT
Start: 2019-06-05 | End: 2019-06-05

## 2019-05-29 RX ORDER — GABAPENTIN 400 MG/1
400 CAPSULE ORAL 3 TIMES DAILY
COMMUNITY
End: 2019-07-22 | Stop reason: SDUPTHER

## 2019-05-29 RX ORDER — SODIUM CHLORIDE, SODIUM LACTATE, POTASSIUM CHLORIDE, CALCIUM CHLORIDE 600; 310; 30; 20 MG/100ML; MG/100ML; MG/100ML; MG/100ML
25 INJECTION, SOLUTION INTRAVENOUS CONTINUOUS
Status: CANCELLED | OUTPATIENT
Start: 2019-06-05

## 2019-05-29 NOTE — PERIOP NOTES
[facility]  Preoperative Instructions        Surgery Date 06/05/2019          Time of Arrival 0830 am Contact # Val Magana (Wife) [phone]    1. On the day of your surgery, please report to the Surgical Services Registration Desk and sign in at your designated time. The Surgery Center is located to the right of the Emergency Room. 2. You must have someone with you to drive you home. You should not drive a car for 24 hours following surgery. Please make arrangements for a friend or family member to stay with you for the first 24 hours after your surgery. 3. Do not have anything to eat or drink (including water, gum, mints, coffee, juice) after midnight ?? .? This may not apply to medications prescribed by your physician. ?(Please note below the special instructions with medications to take the morning of your procedure.)    4. We recommend you do not drink any alcoholic beverages for 24 hours before and after your surgery. 5. Contact your surgeons office for instructions on the following medications: non-steroidal anti-inflammatory drugs (i.e. Advil, Aleve), vitamins, and supplements. (Some surgeons will want you to stop these medications prior to surgery and others may allow you to take them)  **If you are currently taking Plavix, Coumadin, Aspirin and/or other blood-thinning agents, contact your surgeon for instructions. ** Your surgeon will partner with the physician prescribing these medications to determine if it is safe to stop or if you need to continue taking. Please do not stop taking these medications without instructions from your surgeon    6. Wear comfortable clothes. Wear glasses instead of contacts. Do not bring any money or jewelry. Please bring picture ID, insurance card, and any prearranged co-payment or hospital payment. Do not wear make-up, particularly mascara the morning of your surgery.   Do not wear nail polish, particularly if you are having foot /hand surgery. Wear your hair loose or down, no ponytails, buns, mago pins or clips. All body piercings must be removed. Please shower with antibacterial soap for three consecutive days before and on the morning of surgery, but do not apply any lotions, powders or deodorants after the shower on the day of surgery. Please use a fresh towels after each shower. Please sleep in clean clothes and change bed linens the night before surgery. Please do not shave for 48 hours prior to surgery. Shaving of the face is acceptable. 7. You should understand that if you do not follow these instructions your surgery may be cancelled. If your physical condition changes (I.e. fever, cold or flu) please contact your surgeon as soon as possible. 8. It is important that you be on time. If a situation occurs where you may be late, please call [phone] (OR Holding Area). 9. If you have any questions and or problems, please call [phone] (Pre-admission Testing). 10. Your surgery time may be subject to change. You will receive a phone call the evening prior if your time changes. 11.  If having outpatient surgery, you must have someone to drive you here, stay with you during the duration of your stay, and to drive you home at time of discharge. 12.   In an effort to improve the efficiency, privacy, and safety for all of our Pre-op patients visitors are not allowed in the Holding area. Once you arrive and are registered your family/visitors will be asked to remain in the waiting room. The Pre-op staff will get you from the Surgical Waiting Area and will explain to you and your family/visitors that the Pre-op phase is beginning. The staff will answer any questions and provide instructions for tracking of the patient, by use of the existing tracking number and color-coded status board in the waiting room.   At this time the staff will also ask for your designated spokesperson information in the event that the physician or staff need to provide an update or obtain any pertinent information. The designated spokesperson will be notified if the physician needs to speak to family during the pre-operative phase. If at any time your family/visitors has questions or concerns they may approach the volunteer desk in the waiting area for assistance. Special Instructions:    MEDICATIONS TO TAKE THE MORNING OF SURGERY WITH A SIP OF WATER:tylenol if needed, xanax if needed, amlodipine, gabapentin      I understand a pre-operative phone call will be made to verify my surgery time. In the event that I am not available, I give permission for a message to be left on my answering service and/or with another person?   yes         ___________________      __________   _________    (Signature of Patient)             (Witness)                (Date and Time)

## 2019-05-29 NOTE — PERIOP NOTES
Shelley Deluca in Dr Farmer's office to clarify surgery posting to match order sheet. EKG was not repeated during PAT appointment as last EKG was 07/28/2018. Results in [facility].

## 2019-06-04 NOTE — H&P (VIEW-ONLY)
To: Camila Santiago MD    From: Dario Alegre MD    Thank you for sending Jagruti Leal back to see us. Nice to see him again. Encounter Date: 5/20/2019    Subjective:      Jagruti Leal is a 80 y.o. male presents for evaluation of his right inguinal hernia. Still with mild sxs. Has his back surgery and is recovers. Want to fix the hernia now. Recent colonoscopy was OK. No prostate issues. .      Objective:     Visit Vitals  /64 (BP 1 Location: Left arm, BP Patient Position: Sitting)   Pulse 72   Temp 97.7 °F (36.5 °C) (Oral)   Resp 15   Ht 5' 9\" (1.753 m)   Wt 83.6 kg (184 lb 3.2 oz)   SpO2 98%   BMI 27.20 kg/m²       General:  alert, cooperative, no distress, appears stated age   Abdomen: soft, bowel sounds active, non-tender    Right inguinal hernia reducible. Mildly TTP. No LIH. Assessment:     RIH. Mild symptoms. Wants it repaired. Very active. Plan:     Laparoscopic repair. PAT. OP.     Dario Alegre MD

## 2019-06-04 NOTE — PROGRESS NOTES
To: Tracy Kay MD    From: Dee Buenrostro MD    Thank you for sending Vero Tran back to see us. Nice to see him again. Encounter Date: 5/20/2019    Subjective:      Vero Tran is a 80 y.o. male presents for evaluation of his right inguinal hernia. Still with mild sxs. Has his back surgery and is recovers. Want to fix the hernia now. Recent colonoscopy was OK. No prostate issues. .      Objective:     Visit Vitals  /64 (BP 1 Location: Left arm, BP Patient Position: Sitting)   Pulse 72   Temp 97.7 °F (36.5 °C) (Oral)   Resp 15   Ht 5' 9\" (1.753 m)   Wt 83.6 kg (184 lb 3.2 oz)   SpO2 98%   BMI 27.20 kg/m²       General:  alert, cooperative, no distress, appears stated age   Abdomen: soft, bowel sounds active, non-tender    Right inguinal hernia reducible. Mildly TTP. No LIH. Assessment:     RIH. Mild symptoms. Wants it repaired. Very active. Plan:     Laparoscopic repair. PAT. OP.     Dee Buenrostro MD

## 2019-06-05 ENCOUNTER — ANESTHESIA EVENT (OUTPATIENT)
Dept: SURGERY | Age: 84
End: 2019-06-05
Payer: MEDICARE

## 2019-06-05 ENCOUNTER — ANESTHESIA (OUTPATIENT)
Dept: SURGERY | Age: 84
End: 2019-06-05
Payer: MEDICARE

## 2019-06-05 ENCOUNTER — HOSPITAL ENCOUNTER (OUTPATIENT)
Age: 84
Setting detail: OUTPATIENT SURGERY
Discharge: HOME | End: 2019-06-05
Attending: SURGERY | Admitting: SURGERY
Payer: MEDICARE

## 2019-06-05 VITALS
OXYGEN SATURATION: 98 % | WEIGHT: 179.68 LBS | BODY MASS INDEX: 26.53 KG/M2 | HEART RATE: 79 BPM | RESPIRATION RATE: 20 BRPM | TEMPERATURE: 97.6 F | DIASTOLIC BLOOD PRESSURE: 66 MMHG | SYSTOLIC BLOOD PRESSURE: 160 MMHG

## 2019-06-05 DIAGNOSIS — K40.90 RIGHT INGUINAL HERNIA: Primary | ICD-10-CM

## 2019-06-05 PROCEDURE — C1781 MESH (IMPLANTABLE): HCPCS | Performed by: SURGERY

## 2019-06-05 PROCEDURE — 77030032490 HC SLV COMPR SCD KNE COVD -B: Performed by: SURGERY

## 2019-06-05 PROCEDURE — 74011000272 HC RX REV CODE- 272: Performed by: SURGERY

## 2019-06-05 PROCEDURE — 77030011640 HC PAD GRND REM COVD -A: Performed by: SURGERY

## 2019-06-05 PROCEDURE — 77030020053 HC ELECTRD LAPSCP COVD -B: Performed by: SURGERY

## 2019-06-05 PROCEDURE — 76210000021 HC REC RM PH II 0.5 TO 1 HR: Performed by: SURGERY

## 2019-06-05 PROCEDURE — 74011250636 HC RX REV CODE- 250/636

## 2019-06-05 PROCEDURE — 77030002933 HC SUT MCRYL J&J -A: Performed by: SURGERY

## 2019-06-05 PROCEDURE — 77030018836 HC SOL IRR NACL ICUM -A: Performed by: SURGERY

## 2019-06-05 PROCEDURE — 77030020782 HC GWN BAIR PAWS FLX 3M -B

## 2019-06-05 PROCEDURE — 74011000250 HC RX REV CODE- 250: Performed by: SURGERY

## 2019-06-05 PROCEDURE — 74011250636 HC RX REV CODE- 250/636: Performed by: SURGERY

## 2019-06-05 PROCEDURE — 76010000149 HC OR TIME 1 TO 1.5 HR: Performed by: SURGERY

## 2019-06-05 PROCEDURE — 77030008684 HC TU ET CUF COVD -B: Performed by: NURSE ANESTHETIST, CERTIFIED REGISTERED

## 2019-06-05 PROCEDURE — 74011250637 HC RX REV CODE- 250/637: Performed by: ANESTHESIOLOGY

## 2019-06-05 PROCEDURE — C1727 CATH, BAL TIS DIS, NON-VAS: HCPCS | Performed by: SURGERY

## 2019-06-05 PROCEDURE — 77030039266 HC ADH SKN EXOFIN S2SG -A: Performed by: SURGERY

## 2019-06-05 PROCEDURE — 74011250636 HC RX REV CODE- 250/636: Performed by: ANESTHESIOLOGY

## 2019-06-05 PROCEDURE — 77030018673: Performed by: SURGERY

## 2019-06-05 PROCEDURE — 76210000000 HC OR PH I REC 2 TO 2.5 HR: Performed by: SURGERY

## 2019-06-05 PROCEDURE — 77030008756 HC TU IRR SUC STRY -B: Performed by: SURGERY

## 2019-06-05 PROCEDURE — 76060000033 HC ANESTHESIA 1 TO 1.5 HR: Performed by: SURGERY

## 2019-06-05 PROCEDURE — 77030031139 HC SUT VCRL2 J&J -A: Performed by: SURGERY

## 2019-06-05 PROCEDURE — 77030026438 HC STYL ET INTUB CARD -A: Performed by: NURSE ANESTHETIST, CERTIFIED REGISTERED

## 2019-06-05 PROCEDURE — 77030019908 HC STETH ESOPH SIMS -A: Performed by: NURSE ANESTHETIST, CERTIFIED REGISTERED

## 2019-06-05 PROCEDURE — 74011000250 HC RX REV CODE- 250

## 2019-06-05 DEVICE — LAPAROSCOPIC SELF-FIXATING MESH, RIGHT ANATOMICAL
Type: IMPLANTABLE DEVICE | Site: INGUINAL | Status: FUNCTIONAL
Brand: PROGRIP

## 2019-06-05 RX ORDER — GLYCOPYRROLATE 0.2 MG/ML
INJECTION INTRAMUSCULAR; INTRAVENOUS AS NEEDED
Status: DISCONTINUED | OUTPATIENT
Start: 2019-06-05 | End: 2019-06-05 | Stop reason: HOSPADM

## 2019-06-05 RX ORDER — IBUPROFEN 600 MG/1
600 TABLET ORAL
Qty: 21 TAB | Refills: 0 | Status: SHIPPED | OUTPATIENT
Start: 2019-06-05 | End: 2020-12-23

## 2019-06-05 RX ORDER — MIDAZOLAM HYDROCHLORIDE 1 MG/ML
1 INJECTION, SOLUTION INTRAMUSCULAR; INTRAVENOUS AS NEEDED
Status: DISCONTINUED | OUTPATIENT
Start: 2019-06-05 | End: 2019-06-05 | Stop reason: HOSPADM

## 2019-06-05 RX ORDER — PROPOFOL 10 MG/ML
INJECTION, EMULSION INTRAVENOUS AS NEEDED
Status: DISCONTINUED | OUTPATIENT
Start: 2019-06-05 | End: 2019-06-05 | Stop reason: HOSPADM

## 2019-06-05 RX ORDER — DEXAMETHASONE SODIUM PHOSPHATE 4 MG/ML
INJECTION, SOLUTION INTRA-ARTICULAR; INTRALESIONAL; INTRAMUSCULAR; INTRAVENOUS; SOFT TISSUE AS NEEDED
Status: DISCONTINUED | OUTPATIENT
Start: 2019-06-05 | End: 2019-06-05 | Stop reason: HOSPADM

## 2019-06-05 RX ORDER — SODIUM CHLORIDE 0.9 % (FLUSH) 0.9 %
5-40 SYRINGE (ML) INJECTION EVERY 8 HOURS
Status: DISCONTINUED | OUTPATIENT
Start: 2019-06-05 | End: 2019-06-05 | Stop reason: HOSPADM

## 2019-06-05 RX ORDER — SODIUM CHLORIDE, SODIUM LACTATE, POTASSIUM CHLORIDE, CALCIUM CHLORIDE 600; 310; 30; 20 MG/100ML; MG/100ML; MG/100ML; MG/100ML
25 INJECTION, SOLUTION INTRAVENOUS CONTINUOUS
Status: DISCONTINUED | OUTPATIENT
Start: 2019-06-05 | End: 2019-06-05 | Stop reason: HOSPADM

## 2019-06-05 RX ORDER — CEFAZOLIN SODIUM/WATER 2 G/20 ML
2 SYRINGE (ML) INTRAVENOUS ONCE
Status: COMPLETED | OUTPATIENT
Start: 2019-06-05 | End: 2019-06-05

## 2019-06-05 RX ORDER — MORPHINE SULFATE 10 MG/ML
2 INJECTION, SOLUTION INTRAMUSCULAR; INTRAVENOUS
Status: DISCONTINUED | OUTPATIENT
Start: 2019-06-05 | End: 2019-06-05 | Stop reason: HOSPADM

## 2019-06-05 RX ORDER — SODIUM CHLORIDE 0.9 % (FLUSH) 0.9 %
5-40 SYRINGE (ML) INJECTION AS NEEDED
Status: DISCONTINUED | OUTPATIENT
Start: 2019-06-05 | End: 2019-06-05 | Stop reason: HOSPADM

## 2019-06-05 RX ORDER — SODIUM CHLORIDE, SODIUM LACTATE, POTASSIUM CHLORIDE, CALCIUM CHLORIDE 600; 310; 30; 20 MG/100ML; MG/100ML; MG/100ML; MG/100ML
100 INJECTION, SOLUTION INTRAVENOUS CONTINUOUS
Status: DISCONTINUED | OUTPATIENT
Start: 2019-06-05 | End: 2019-06-05 | Stop reason: HOSPADM

## 2019-06-05 RX ORDER — ONDANSETRON 2 MG/ML
INJECTION INTRAMUSCULAR; INTRAVENOUS AS NEEDED
Status: DISCONTINUED | OUTPATIENT
Start: 2019-06-05 | End: 2019-06-05 | Stop reason: HOSPADM

## 2019-06-05 RX ORDER — ONDANSETRON 2 MG/ML
4 INJECTION INTRAMUSCULAR; INTRAVENOUS AS NEEDED
Status: DISCONTINUED | OUTPATIENT
Start: 2019-06-05 | End: 2019-06-05 | Stop reason: HOSPADM

## 2019-06-05 RX ORDER — BUPIVACAINE HYDROCHLORIDE AND EPINEPHRINE 5; 5 MG/ML; UG/ML
INJECTION, SOLUTION EPIDURAL; INTRACAUDAL; PERINEURAL AS NEEDED
Status: DISCONTINUED | OUTPATIENT
Start: 2019-06-05 | End: 2019-06-05 | Stop reason: HOSPADM

## 2019-06-05 RX ORDER — FENTANYL CITRATE 50 UG/ML
25 INJECTION, SOLUTION INTRAMUSCULAR; INTRAVENOUS
Status: DISCONTINUED | OUTPATIENT
Start: 2019-06-05 | End: 2019-06-05 | Stop reason: HOSPADM

## 2019-06-05 RX ORDER — HYDROCODONE BITARTRATE AND ACETAMINOPHEN 5; 325 MG/1; MG/1
1-2 TABLET ORAL
Qty: 30 TAB | Refills: 0 | Status: SHIPPED | OUTPATIENT
Start: 2019-06-05 | End: 2019-06-10

## 2019-06-05 RX ORDER — SUCCINYLCHOLINE CHLORIDE 20 MG/ML
INJECTION INTRAMUSCULAR; INTRAVENOUS AS NEEDED
Status: DISCONTINUED | OUTPATIENT
Start: 2019-06-05 | End: 2019-06-05 | Stop reason: HOSPADM

## 2019-06-05 RX ORDER — ROCURONIUM BROMIDE 10 MG/ML
INJECTION, SOLUTION INTRAVENOUS AS NEEDED
Status: DISCONTINUED | OUTPATIENT
Start: 2019-06-05 | End: 2019-06-05 | Stop reason: HOSPADM

## 2019-06-05 RX ORDER — LIDOCAINE HYDROCHLORIDE 20 MG/ML
INJECTION, SOLUTION EPIDURAL; INFILTRATION; INTRACAUDAL; PERINEURAL AS NEEDED
Status: DISCONTINUED | OUTPATIENT
Start: 2019-06-05 | End: 2019-06-05 | Stop reason: HOSPADM

## 2019-06-05 RX ORDER — SODIUM CHLORIDE 9 MG/ML
25 INJECTION, SOLUTION INTRAVENOUS CONTINUOUS
Status: DISCONTINUED | OUTPATIENT
Start: 2019-06-05 | End: 2019-06-05 | Stop reason: HOSPADM

## 2019-06-05 RX ORDER — FENTANYL CITRATE 50 UG/ML
INJECTION, SOLUTION INTRAMUSCULAR; INTRAVENOUS AS NEEDED
Status: DISCONTINUED | OUTPATIENT
Start: 2019-06-05 | End: 2019-06-05 | Stop reason: HOSPADM

## 2019-06-05 RX ORDER — ROPIVACAINE HYDROCHLORIDE 5 MG/ML
30 INJECTION, SOLUTION EPIDURAL; INFILTRATION; PERINEURAL AS NEEDED
Status: DISCONTINUED | OUTPATIENT
Start: 2019-06-05 | End: 2019-06-05 | Stop reason: HOSPADM

## 2019-06-05 RX ORDER — POLYETHYLENE GLYCOL 3350 17 G/17G
17 POWDER, FOR SOLUTION ORAL DAILY
Qty: 510 G | Refills: 0 | Status: SHIPPED | OUTPATIENT
Start: 2019-06-05

## 2019-06-05 RX ORDER — MIDAZOLAM HYDROCHLORIDE 1 MG/ML
0.5 INJECTION, SOLUTION INTRAMUSCULAR; INTRAVENOUS
Status: DISCONTINUED | OUTPATIENT
Start: 2019-06-05 | End: 2019-06-05 | Stop reason: HOSPADM

## 2019-06-05 RX ORDER — HYDROMORPHONE HYDROCHLORIDE 1 MG/ML
0.5 INJECTION, SOLUTION INTRAMUSCULAR; INTRAVENOUS; SUBCUTANEOUS
Status: DISCONTINUED | OUTPATIENT
Start: 2019-06-05 | End: 2019-06-05 | Stop reason: HOSPADM

## 2019-06-05 RX ORDER — DIPHENHYDRAMINE HYDROCHLORIDE 50 MG/ML
12.5 INJECTION, SOLUTION INTRAMUSCULAR; INTRAVENOUS AS NEEDED
Status: DISCONTINUED | OUTPATIENT
Start: 2019-06-05 | End: 2019-06-05 | Stop reason: HOSPADM

## 2019-06-05 RX ORDER — EPHEDRINE SULFATE/0.9% NACL/PF 50 MG/5 ML
SYRINGE (ML) INTRAVENOUS AS NEEDED
Status: DISCONTINUED | OUTPATIENT
Start: 2019-06-05 | End: 2019-06-05 | Stop reason: HOSPADM

## 2019-06-05 RX ORDER — ACETAMINOPHEN 325 MG/1
650 TABLET ORAL ONCE
Status: COMPLETED | OUTPATIENT
Start: 2019-06-05 | End: 2019-06-05

## 2019-06-05 RX ORDER — LIDOCAINE HYDROCHLORIDE 10 MG/ML
0.1 INJECTION, SOLUTION EPIDURAL; INFILTRATION; INTRACAUDAL; PERINEURAL AS NEEDED
Status: DISCONTINUED | OUTPATIENT
Start: 2019-06-05 | End: 2019-06-05 | Stop reason: HOSPADM

## 2019-06-05 RX ORDER — FENTANYL CITRATE 50 UG/ML
50 INJECTION, SOLUTION INTRAMUSCULAR; INTRAVENOUS AS NEEDED
Status: DISCONTINUED | OUTPATIENT
Start: 2019-06-05 | End: 2019-06-05 | Stop reason: HOSPADM

## 2019-06-05 RX ADMIN — Medication 5 MG: at 10:42

## 2019-06-05 RX ADMIN — FENTANYL CITRATE 50 MCG: 50 INJECTION, SOLUTION INTRAMUSCULAR; INTRAVENOUS at 10:30

## 2019-06-05 RX ADMIN — LIDOCAINE HYDROCHLORIDE 100 MG: 20 INJECTION, SOLUTION EPIDURAL; INFILTRATION; INTRACAUDAL; PERINEURAL at 10:26

## 2019-06-05 RX ADMIN — ONDANSETRON 4 MG: 2 INJECTION INTRAMUSCULAR; INTRAVENOUS at 10:48

## 2019-06-05 RX ADMIN — DEXAMETHASONE SODIUM PHOSPHATE 8 MG: 4 INJECTION, SOLUTION INTRA-ARTICULAR; INTRALESIONAL; INTRAMUSCULAR; INTRAVENOUS; SOFT TISSUE at 10:45

## 2019-06-05 RX ADMIN — Medication 2 G: at 10:29

## 2019-06-05 RX ADMIN — Medication 5 MG: at 10:38

## 2019-06-05 RX ADMIN — SODIUM CHLORIDE, SODIUM LACTATE, POTASSIUM CHLORIDE, AND CALCIUM CHLORIDE 25 ML/HR: 600; 310; 30; 20 INJECTION, SOLUTION INTRAVENOUS at 08:40

## 2019-06-05 RX ADMIN — PROPOFOL 50 MG: 10 INJECTION, EMULSION INTRAVENOUS at 10:26

## 2019-06-05 RX ADMIN — PROPOFOL 20 MG: 10 INJECTION, EMULSION INTRAVENOUS at 10:50

## 2019-06-05 RX ADMIN — Medication 10 MG: at 11:13

## 2019-06-05 RX ADMIN — ACETAMINOPHEN 650 MG: 325 TABLET ORAL at 08:40

## 2019-06-05 RX ADMIN — SUCCINYLCHOLINE CHLORIDE 140 MG: 20 INJECTION INTRAMUSCULAR; INTRAVENOUS at 10:26

## 2019-06-05 RX ADMIN — FENTANYL CITRATE 50 MCG: 50 INJECTION, SOLUTION INTRAMUSCULAR; INTRAVENOUS at 10:59

## 2019-06-05 RX ADMIN — GLYCOPYRROLATE 0.1 MG: 0.2 INJECTION INTRAMUSCULAR; INTRAVENOUS at 10:26

## 2019-06-05 RX ADMIN — ROCURONIUM BROMIDE 10 MG: 10 INJECTION, SOLUTION INTRAVENOUS at 10:26

## 2019-06-05 NOTE — DISCHARGE INSTRUCTIONS
Discharge Instructions: Hernia -- Dr. Lashonda Ibanez    Call on next business day to arrange appointment for follow up in 3 weeks -- [phone]. Activity:  Walk regularly. No lifting more than 10 pounds for 6 weeks. Light aerobic activity is okay when you feel up to it. You may resume driving in three days unless still requiring narcotics for pain. Return to work in 1-2 weeks but no heavy lifting for 6 weeks. Apply ice to areas of tenderness for 20 minutes at a time. Keep a cloth between the skin and the bag of ice. Work:  You may return to work in 1 or 2 weeks to light activity. No lifting more than 10 pounds (=\"light duty\") for 6 weeks. If your employer can not accommodate \"light duty,\" your employer will need to provide any necessary paperwork to our office. This document with serve as the initial \"note\" to your employer. Diet:  You may resume normal diet. Wound Care:  Glue dressing will fall off on its own. If you experience a lot of drainage, develop redness around the wound, or a fever over 101 F occurs please call the office. There will be significant swelling under the incision(s) that will develop over the next 3-4 days. Ice will help reduce this. Male patients may experience swelling and bruising of the scrotum -- this is normal.  However, if the scrotum becomes tense and painful you should call. Wear a jock strap/athletic supporter for the next week to reduce scrotal swelling. If you can't urinate within 8 hours, call. Intentionally urinate every 2 hours today, even if you don't feel like you have to go. You may shower. No baths or swimming for 3 weeks. Medications:  See attached \"Medication Reconciliation. \"    Resume home medications as indicated on the Medical Reconciliation form. Do not use blood thinners (such as Aspirin, Coumadin, or Plavix) until 3 days after surgery.       Pain medications:  Non steroidal antiinflammatories (ibuprofen -- Advil or Motrin) seem to work best for post surgical pain. Try these first.      PUT ICE ON YOUR INCISIONS 20 MINUTES EVERY HOUR WHILE THEY REMAIN SORE. PLACE A CLOTH BETWEEN YOUR SKIN THE THE ICE BAG. A narcotic prescription will also be given for breakthrough pain. Tylenol can be used in place of the narcotic. Colace or Miralax should be used twice daily to prevent constipation while on narcotics. If you are still having trouble having a BM after 1-2 days, try milk of magnesia. If this does not work within 24 hours, try a bottle of magnesium citrate. If this does not work, call us. Narcotics and anesthesia sometimes cause nausea and vomiting. If persistent please call the office. Do not hesitate to call with questions or concerns. Lisa Heaton MD  Tel [phone]  Fax [phone]  DISCHARGE SUMMARY from Nurse    PATIENT INSTRUCTIONS:    After general anesthesia or intravenous sedation, for 24 hours or while taking prescription Narcotics:  · Limit your activities  · Do not drive and operate hazardous machinery  · Do not make important personal or business decisions  · Do  not drink alcoholic beverages  · If you have not urinated within 8 hours after discharge, please contact your surgeon on call. Report the following to your surgeon:  · Excessive pain, swelling, redness or odor of or around the surgical area  · Temperature over 100.5  · Nausea and vomiting lasting longer than 4 hours or if unable to take medications  · Any signs of decreased circulation or nerve impairment to extremity: change in color, persistent  numbness, tingling, coldness or increase pain  · Any questions    What to do at Home:  A common side effect of anesthesia following surgery is nausea and/or vomiting. In order to decrease symptoms, it is wise to avoid foods that are high in fat, greasy foods, milk products, and spicy foods for the first 24 hours.     Acceptable foods for the first 24 hours following surgery include but are not limited to:     soup   broth    toast    crackers    applesauce    bananas    mashed potatoes,   soft or scrambled eggs   oatmeal    jello    It is important to eat when taking your pain medication. This will help to prevent nausea. If possible, please try to time your meals with your medications. It is very important to stay hydrated following surgery. Sip fluids frequently while awake. Avoid acidic drinks such as citrus juices and soda for 24 hours. Carbonated beverages may cause bloating and gas. Acceptable fluids include:    - water (flavor packets may add variety)  - coffee or tea (in moderation)  - Gatorade  - Sanchez-aid  - apple juice  - cranberry juice    You are encouraged to cough and deep breathe every hour when awake. This will help to prevent respiratory complications following anesthesia. You may want to hug a pillow when coughing and sneezing to add additional support to the surgical area and to decrease discomfort if you had abdominal or chest surgery. If you are discharged home with support stockings, you may remove them after 24 hours. Support stockings are used to help prevent blood clots in the legs following surgery. Please take time to review all of your Home Care Instructions and Medication Information sheets provided in your discharge packet. If you have any questions, please contact your surgeons office. Thank you. How to Care for Your Wound After Its Treated With  DERMABOND* Topical Skin Adhesive  DERMABOND* Topical Skin Adhesive (2-octyl cyanoacrylate) is a sterile, liquid skin adhesive  that holds wound edges together. The film will usually remain in place for 5 to 10 days, then  naturally fall off your skin.   The following will answer some of your questions and provide instructions for proper care for your  wound while it is healing:    CHECK WOUND APPEARANCE   Some swelling, redness, and pain are common with all wounds and normally will go away as the  wound heals. If swelling, redness, or pain increases or if the wound feels warm to the touch,  contact a doctor. Also contact a doctor if the wound edges reopen or separate. REPLACE BANDAGES   If your wound is bandaged, keep the bandage dry.  Replace the dressing daily until the adhesive film has fallen off or if the  bandage should become wet, unless otherwise instructed by your  physician.  When changing the dressing, do not place tape directly over the  DERMABOND adhesive film, because removing the tape later may also  remove the film. AVOID TOPICAL MEDICATIONS   Do not apply liquid or ointment medications or any other product to your wound while the  DERMABOND adhesive film is in place. These may loosen the film before your wound is healed. KEEP WOUND DRY AND PROTECTED   You may occasionally and briefly wet your wound in the shower or bath. Do not soak or scrub  your wound, do not swim, and avoid periods of heavy perspiration until the DERMABOND  adhesive has naturally fallen off. After showering or bathing, gently blot your wound dry with a  soft towel. If a protective dressing is being used, apply a fresh, dry bandage, being sure to keep  the tape off the DERMABOND adhesive film.  Apply a clean, dry bandage over the wound if necessary to protect it.  Protect your wound from injury until the skin has had sufficient time to heal.   Do not scratch, rub, or pick at the DERMABOND adhesive film. This may loosen the film before  your wound is healed.  Protect the wound from prolonged exposure to sunlight or tanning lamps while the film is in  place. If you have any questions or concerns about this product, please consult your doctor. *Trademark ©[employer] 1432EG PREVENT AN INFECTION      1. [address] YOUR HANDS     To prevent infection, good handwashing is the most important thing you or your caregiver can do.        Wash your hands with soap and water or use the hand  we gave you before you touch any wounds. 2. SHOWER     Use the antibacterial soap we gave you when you take a shower.  Shower with this soap until your wounds are healed.  To reach all areas of your body, you may need someone to help you.  Dont forget to clean your belly button with every shower. 3.  USE CLEAN SHEETS     Use freshly cleaned sheets on your bed after surgery.  To keep the surgery site clean, do not allow pets to sleep with you while your wound is still healing. 4. STOP SMOKING     Stop smoking, or at least cut back on smoking     Smoking slows your healing. 5.  CONTROL YOUR BLOOD SUGAR     High blood sugars slow wound healing. If you are diabetic, control your blood sugar levels before and after your surgery. Patient Education      Hydrocodone/Acetaminophen (Vicodin, Norco, Lortab) - (By mouth)   Why this medicine is used:   Treats pain. Contact a nurse or doctor right away if you have:  · Blistering, peeling, red skin rash  · Fast or slow heartbeat, shallow breathing, blue lips, fingernails, or skin  · Anxiety, restlessness, muscle spasms, twitching, seeing or hearing things that are not there  · Dark urine or pale stools, yellow skin or eyes  · Extreme weakness, sweating, seizures, cold or clammy skin  · Lightheadedness, dizziness, fainting, fever, sweating     Common side effects:  · Constipation, nausea, vomiting, loss of appetite, stomach pain  · Tiredness or sleepiness  © 2017 [facility] Information is for End User's use only and may not be sold, redistributed or otherwise used for commercial purposes. Patient Education      Ibuprofen (Advil, Advil Children's, Motrin, Children's Ibuprofen) - (By mouth)   Why this medicine is used:   Treats pain and fever. This medicine is an NSAID.   Contact a nurse or doctor right away if you have:  · Change in how much or how often you urinate  · Severe stomach pain, vomiting blood, bloody or black tarry stools  · Swelling in your hands, ankles, or feet; rapid weight gain     Common side effects:  · Constipation, diarrhea, gas, mild upset stomach  · Ringing in your ears, dizziness, headache  © 2017 [facility] Information is for End User's use only and may not be sold, redistributed or otherwise used for commercial purposes. No smoking/ No tobacco products/ Avoid exposure to second hand smoke  Surgeon General's Warning:  Quitting smoking now greatly reduces serious risk to your health. Obesity, smoking, and sedentary lifestyle greatly increases your risk for illness    A healthy diet, regular physical exercise & weight monitoring are important for maintaining a healthy lifestyle    You may be retaining fluid if you have a history of heart failure or if you experience any of the following symptoms:  Weight gain of 3 pounds or more overnight or 5 pounds in a week, increased swelling in our hands or feet or shortness of breath while lying flat in bed. Please call your doctor as soon as you notice any of these symptoms; do not wait until your next office visit. Recognize signs and symptoms of STROKE:    F-face looks uneven    A-arms unable to move or move unevenly    S-speech slurred or non-existent    T-time-call 911 as soon as signs and symptoms begin-DO NOT go       Back to bed or wait to see if you get better-TIME IS BRAIN. Warning Signs of HEART ATTACK     Call 911 if you have these symptoms:   Chest discomfort. Most heart attacks involve discomfort in the center of the chest that lasts more than a few minutes, or that goes away and comes back. It can feel like uncomfortable pressure, squeezing, fullness, or pain.  Discomfort in other areas of the upper body. Symptoms can include pain or discomfort in one or both arms, the back, neck, jaw, or stomach.  Shortness of breath with or without chest discomfort.    Other signs may include breaking out in a cold sweat, nausea, or lightheadedness. Don't wait more than five minutes to call 911 - MINUTES MATTER! Fast action can save your life. Calling 911 is almost always the fastest way to get lifesaving treatment. Emergency Medical Services staff can begin treatment when they arrive -- up to an hour sooner than if someone gets to the hospital by car. The discharge information has been reviewed with the {PATIENT PARENT GUARDIAN:07880}. The {PATIENT PARENT GUARDIAN:25439} verbalized understanding. Discharge medications reviewed with the {Dishcarge meds reviewed JORF:64200} and appropriate educational materials and side effects teaching were provided.   ___________________________________________________________________________________________________________________________________

## 2019-06-05 NOTE — PERIOP NOTES
Spoke with patient's wife, irene, and provided update. Patient's temperature cool, jerel paws in place for warming. Patient has no complaints. Once temperature is adequate will transition to phase 2 for discharge. Patient already voided 150ml clear yellow urine without difficulty.

## 2019-06-05 NOTE — OP NOTES
DATE OF PROCEDURE:  6/5/2019     SURGEON: Marcie Leigh MD     PREOPERATIVE DIAGNOSIS: Symptomatic right inguinal hernia. POSTOPERATIVE DIAGNOSIS: Symptomatic right inguinal hernia. OPERATIVE PROCEDURE: Laparoscopic right inguinal hernia repair with mesh (CPT K5758544)    ANESTHESIA: General endotracheal.     ESTIMATED BLOOD LOSS: Minimal.     IMPLANTS:     Implant Name Type Inv. Item Serial No.  Lot No. LRB No. Used Action   MESH SLF-FLT PROGRIP RT -- PROGRIP - SNA  MESH SLF-FLT PROGRIP RT -- [facility]GRIP [facility] [id number] Right 1 Implanted       SPECIMENS:  * No specimens in log *     INDICATIONS: Groin pain with bulge. FINDINGS: Direct right inguinal hernia. DESCRIPTION OF THE PROCEDURE: After obtaining informed consent, the patient was taken to the operating room and placed supine on the operating table. An operative timeout was performed, and general endotracheal anesthesia was induced. Preoperative antibiotics were administered, and the abdomen was prepped and draped in the usual sterile fashion. An Apolinar Boehringer was employed. A 2 cm incision just below the umbilicus was made with a blade and taken down through the subcutaneous tissues with electrocautery. The anterior sheath and linea alba were exposed. The contralateral anterior sheath was incised with electrocautery, and the rectus abdominis muscle was identified. This was elevated anteriorly with an S retractor, and the dissecting balloon system was inserted under direct palpation to the pubic symphysis. Under direct vision with the the camera, the dissecting balloon was inflated until adequate space was created. The stabilizing balloon was then inflated, and the dissecting balloon was removed. The preperitoneal space was insufflated without incident. Two 5 mm ports were then placed in the midline below the initial port. These were placed under direct vision.   A total of 30mL of 0.5% Marcaine with epinephrine was infiltrated at the 3 port sites prior to placement. Using blunt graspers, the preperitoneal space was developed laterally and carried medially. The cord structures were then explored, and the hernia sac was identified. This was dissected free of the other cord structures and the peritoneum was reflected posteriorly and superiorly. Finally, the Space of Retzius was developed. The peritoneum was satisfactorily reflected laterally, posteriorly and then caudally. The mesh was soaked in bacitracin instilled saline, and then inserted into the preperitoneal space. It was set in place with the lateral edge tucked deep beneath the reflected peritoneum. An overlap of 1 cm with the pubic symphysis was ensured. The internal ring was directly at the center of the mesh. After ensuring excellent hemostasis, an open grasper was used to hold the mesh over the cord structures, and the preperitoneal space was desufflated and the peritoneum was seen to come up over the mesh properly. The trocars and balloon system was then removed. The anterior sheath was closed with a running 0 Vicryl stitch. The incisions were irrigated with sterile saline and reapproximated with buried interrupted 4-0 Monocryl sutures. The incisions were dressed with Dermabond, and the patient was recovered from general anesthesia and taken to the recovery area in satisfactory condition. All instrument, sponge, and needle counts were reported as correct.

## 2019-06-05 NOTE — INTERVAL H&P NOTE
H&P Update:  Gilford Rayas was seen and examined. History and physical has been reviewed. The patient has been examined.  There have been no significant clinical changes since the completion of the originally dated History and Physical.

## 2019-06-05 NOTE — ANESTHESIA PREPROCEDURE EVALUATION
Relevant Problems   No relevant active problems       Anesthetic History   No history of anesthetic complications            Review of Systems / Medical History  Patient summary reviewed, nursing notes reviewed and pertinent labs reviewed    Pulmonary  Within defined limits                 Neuro/Psych   Within defined limits           Cardiovascular  Within defined limits  Hypertension              Exercise tolerance: >4 METS     GI/Hepatic/Renal  Within defined limits              Endo/Other  Within defined limits      Arthritis     Other Findings              Physical Exam    Airway  Mallampati: II  TM Distance: > 6 cm  Neck ROM: normal range of motion   Mouth opening: Normal     Cardiovascular  Regular rate and rhythm,  S1 and S2 normal,  no murmur, click, rub, or gallop             Dental  No notable dental hx       Pulmonary  Breath sounds clear to auscultation               Abdominal  GI exam deferred       Other Findings            Anesthetic Plan    ASA: 2  Anesthesia type: general          Induction: Intravenous  Anesthetic plan and risks discussed with: Patient

## 2019-06-05 NOTE — PERIOP NOTES
Handoff Report from Operating Room to PACU    Report received from Serge Dolan CRNA and Camille Owen RN regarding Marisabel Lantigua. Surgeon(s):  Neema Culp MD  And Procedure(s) (LRB):  LAPAROSCOPIC RIGHT INGUINAL HERNIA REPAIR WITH MESH (Right)  confirmed   with allergies and dressings discussed. Anesthesia type, drugs, patient history, complications, estimated blood loss, vital signs, intake and output, and last pain medication, lines, reversal medications and temperature were reviewed.

## 2019-06-05 NOTE — ANESTHESIA POSTPROCEDURE EVALUATION
Procedure(s):  LAPAROSCOPIC RIGHT INGUINAL HERNIA REPAIR WITH MESH. general    Anesthesia Post Evaluation        Patient location during evaluation: PACU  Note status: Adequate. Level of consciousness: responsive to verbal stimuli and sleepy but conscious  Pain management: satisfactory to patient  Airway patency: patent  Anesthetic complications: no  Cardiovascular status: acceptable  Respiratory status: acceptable  Hydration status: acceptable  Comments: +Post-Anesthesia Evaluation and Assessment    Patient: Hollie Carney MRN: [id number]  SSN: [id number]   YOB: 1935  Age: 80 y.o. Sex: male          Cardiovascular Function/Vital Signs    /58 (BP 1 Location: Right arm, BP Patient Position: At rest)   Pulse 80   Temp 36.3 °C (97.4 °F)   Resp 18   Wt 81.5 kg (179 lb 10.8 oz)   SpO2 98%   BMI 26.53 kg/m²     Patient is status post Procedure(s):  LAPAROSCOPIC RIGHT INGUINAL HERNIA REPAIR WITH MESH. Nausea/Vomiting: Controlled. Postoperative hydration reviewed and adequate. Pain:  Pain Scale 1: Numeric (0 - 10) (06/05/19 1202)  Pain Intensity 1: 2 (06/05/19 1202)   Managed. Neurological Status:   Neuro (WDL): Exceptions to WDL (06/05/19 1130)   At baseline. Mental Status and Level of Consciousness: Arousable. Pulmonary Status:   O2 Device: Room air (06/05/19 1200)   Adequate oxygenation and airway patent. Complications related to anesthesia: None    Post-anesthesia assessment completed. No concerns. I have evaluated the patient and the patient is stable and ready to be discharged from PACU . Signed By: Yonny Mejias MD    6/5/2019        Vitals Value Taken Time   /60 6/5/2019 12:15 PM   Temp 36.3 °C (97.4 °F) 6/5/2019 11:32 AM   Pulse 80 6/5/2019 12:18 PM   Resp 14 6/5/2019 12:18 PM   SpO2 97 % 6/5/2019 12:18 PM   Vitals shown include unvalidated device data.

## 2019-06-27 ENCOUNTER — OFFICE VISIT (OUTPATIENT)
Dept: INTERNAL MEDICINE CLINIC | Age: 84
End: 2019-06-27

## 2019-06-27 ENCOUNTER — OFFICE VISIT (OUTPATIENT)
Dept: SURGERY | Age: 84
End: 2019-06-27

## 2019-06-27 VITALS
HEIGHT: 69 IN | OXYGEN SATURATION: 98 % | HEART RATE: 69 BPM | TEMPERATURE: 97.8 F | SYSTOLIC BLOOD PRESSURE: 159 MMHG | RESPIRATION RATE: 16 BRPM | DIASTOLIC BLOOD PRESSURE: 72 MMHG | WEIGHT: 182.8 LBS | BODY MASS INDEX: 27.08 KG/M2

## 2019-06-27 VITALS
TEMPERATURE: 97.8 F | HEIGHT: 69 IN | BODY MASS INDEX: 26.98 KG/M2 | SYSTOLIC BLOOD PRESSURE: 130 MMHG | RESPIRATION RATE: 18 BRPM | WEIGHT: 182.2 LBS | OXYGEN SATURATION: 98 % | DIASTOLIC BLOOD PRESSURE: 70 MMHG | HEART RATE: 72 BPM

## 2019-06-27 DIAGNOSIS — M48.061 SPINAL STENOSIS OF LUMBAR REGION, UNSPECIFIED WHETHER NEUROGENIC CLAUDICATION PRESENT: ICD-10-CM

## 2019-06-27 DIAGNOSIS — Z09 POSTOPERATIVE EXAMINATION: Primary | ICD-10-CM

## 2019-06-27 DIAGNOSIS — I10 ESSENTIAL HYPERTENSION: Primary | ICD-10-CM

## 2019-06-27 NOTE — PROGRESS NOTES
This note will not be viewable in [address] Anali Deleon is a 80 y.o. male and presents with Hypertension (6 month follow up)  . Subjective:    Mr. Ilir Hammond presents today for follow-up of hypertension. He is doing well on his current medical regimen. Continues to have significant pain from his lower back with a history of spinal stenosis and has had previous surgery for this. He is able to walk more than 100 feet without having to stop and rest.  Recently underwent a right inguinal hernia repair with no complication. He denies shortness of breath, chest pain, palpitations, PND, orthopnea, or pedal edema. Review of Systems  Constitutional:   Eyes:   negative for visual disturbance and irritation  ENT:   negative for tinnitus,sore throat,nasal congestion,ear pains. hoarseness  Respiratory:  negative for cough, hemoptysis, dyspnea,wheezing  CV:   negative for chest pain, palpitations, lower extremity edema  GI:   negative for nausea, vomiting, diarrhea, abdominal pain,melena  Endo:               negative for polyuria,polydipsia,polyphagia,heat intolerance  Genitourinary: negative for frequency, dysuria and hematuria  Integumentary: negative for rash and pruritus  Hematologic:  negative for easy bruising and gum/nose bleeding  Musculoskel: negative for myalgias, arthralgias,  muscle weakness, joint pain  Neurological:  negative for headaches, dizziness, vertigo, memory problems and gait   Behavl/Psych: negative for feelings of anxiety, depression, mood changes    Past Medical History:   Diagnosis Date    Allergic rhinitis 12/15/2017    Arthritis of knee, left 12/15/2017    Aseptic meningitis     Chronic pain     chronic back pain    Colon polyps     Constipation 12/15/2017    Dysphagia 12/15/2017    Elevated blood pressure reading 12/15/2017    Fatigue 12/15/2017    Headache 12/15/2017    Hematuria 12/15/2017    Herpes zoster dermatitis 12/15/2017    Hyperkalemia 12/15/2017    Hypertension 12/15/2017    Insomnia 12/15/2017    Osteoarthritis 12/15/2017    Otitis externa 12/15/2017    Prostate cancer screening 12/15/2017    Right groin hernia     Sciatica 12/15/2017    Sciatica of left side associated with disorder of lumbosacral spine 12/15/2017    Shingles     Spinal stenosis 12/15/2017     Past Surgical History:   Procedure Laterality Date    COLONOSCOPY N/A 11/29/2018    COLONOSCOPY performed by Areli [facility]ard [facility] at [facility]COPY    COLONOSCOPY,DIAGNOSTIC  11/11/2014         COLONOSCOPY,DIAGNOSTIC  11/29/2018         HX CATARACT REMOVAL Bilateral     with lens implants    HX COLONOSCOPY      HX HERNIA REPAIR Left     HX HERNIA REPAIR  06/05/2019    HX ORTHOPAEDIC  2008    rods placed in back    HX ORTHOPAEDIC  10/6/15    LEFT L5-S1 MICRODISCECTOMY     HX ORTHOPAEDIC  08/2018    glynn replacement, by Dr. Rolanda Ni      steroid injection for back pain    HX TONSILLECTOMY      KS EGD INSERT GUIDE WIRE DILATOR PASSAGE ESOPHAGUS  10/17/2013         KS EGD TRANSORAL BIOPSY SINGLE/MULTIPLE  10/17/2013          Social History     Socioeconomic History    Marital status:      Spouse name: Not on file    Number of children: Not on file    Years of education: Not on file    Highest education level: Not on file   Tobacco Use    Smoking status: Never Smoker    Smokeless tobacco: Never Used   Substance and Sexual Activity    Alcohol use: Never     Frequency: Never    Drug use: Never     Family History   Problem Relation Age of Onset    Lung Disease Mother         lung cancer    Hypertension Mother     Heart Failure Mother     Hypertension Father     Heart Failure Father      Current Outpatient Medications   Medication Sig Dispense Refill    ibuprofen (MOTRIN) 600 mg tablet Take 1 Tab by mouth three (3) times daily (with meals). 21 Tab 0    polyethylene glycol (MIRALAX) 17 gram/dose powder Take 17 g by mouth daily.  510 g 0    gabapentin (NEURONTIN) 400 mg capsule Take 400 mg by mouth three (3) times daily.  ALPRAZolam (XANAX) 0.5 mg tablet TAKE 1 TABLET BY MOUTH 3 TIMES DAILY AS NEEDED FOR ANXIETY 90 Tab 0    Wheat Dextrin (BENEFIBER CLEAR) 3 gram/3.5 gram pwpk Take 1 Each by mouth daily.  tiZANidine (ZANAFLEX) 4 mg tablet Take 1 Tab by mouth nightly. 90 Tab 3    amLODIPine (NORVASC) 5 mg tablet Take 1 Tab by mouth daily. 90 Tab 3    vit C/E/Zn/coppr/lutein/zeaxan (PRESERVISION AREDS 2 PO) Take 1 Tab by mouth two (2) times a day.  acetaminophen (TYLENOL) 325 mg tablet Take 650 mg by mouth every four (4) hours as needed for Pain.  naloxone (NARCAN) 4 mg/actuation nasal spray 1 [place] by IntraNASal route as needed (respiratory depression). Give single spray into one nostril. Call 911. Give additional doses every 2 to 3 minutes alternating nostrils until assistance arrives using a new nasal spray with each dose, if patient does not respond or responds and then relapses.  (Patient not taking: Reported on 6/27/2019) 1 Each 0     No Known Allergies    Objective:  Visit Vitals  /70 (BP 1 Location: Left arm, BP Patient Position: Sitting)   Pulse 72   Temp 97.8 °F (36.6 °C) (Oral)   Resp 18   Ht 5' 9\" (1.753 m)   Wt 182 lb 3.2 oz (82.6 kg)   SpO2 98%   BMI 26.91 kg/m²     Physical Exam:   General appearance - alert, well appearing, and in no distress  Mental status - alert, oriented to person, place, and time  EYE-TRACY, EOMI, fundi normal, corneas normal, no foreign bodies  ENT-ENT exam normal, no neck nodes or sinus tenderness  Nose - normal and patent, no erythema, discharge or polyps  Mouth - mucous membranes moist, pharynx normal without lesions  Neck - supple, no significant adenopathy   Chest - clear to auscultation, no wheezes, rales or rhonchi, symmetric air entry   Heart - normal rate, regular rhythm, normal S1, S2, no murmurs, rubs, clicks or gallops   Abdomen - soft, nontender, nondistended, no masses or organomegaly  Lymph- no adenopathy palpable  Ext-peripheral pulses normal, no pedal edema, no clubbing or cyanosis  Skin-Warm and dry. no hyperpigmentation, vitiligo, or suspicious lesions  Neuro -alert, oriented, normal speech, no focal findings or movement disorder noted  Musculoskeletal- FROM, no bony abnormalities, no point tenderness    No results found for this visit on 06/27/19. All results for lab orders may not have been returned by the time this encountered was closed. Assessment/Plan:       ICD-10-CM ICD-9-CM    1. Essential hypertension I10 401.9    2. Spinal stenosis of lumbar region, unspecified whether neurogenic claudication present M48.061 724.02        No orders of the defined types were placed in this encounter. Plan:    Blood pressure appears to be well controlled on his current regimen. DMV handicap parking form filled out for the patient today for his spinal stenosis and claudication. He may try ibuprofen as needed for her spinal stenosis if this gives him any benefit. He understands the possible risk and complications from taking this on a regular basis. We will of course continue to monitor for complications. Follow-up as scheduled in 6 months. I have reviewed with the patient details of the assessment and plan and all questions were answered. Relevent patient education was performed. Verbal and/or written instructions (see AVS) provided. The most recent lab findings were reviewed with the patient. Plan was discussed with patient who verbal expressed understanding. An After Visit Summary was printed and given to the patient.       Janay Vasques MD

## 2019-06-27 NOTE — PROGRESS NOTES
Juan Mendieta  Identified pt with two pt identifiers(name and ). Chief Complaint   Patient presents with    Hypertension     6 month follow up       1. Have you been to the ER, urgent care clinic since your last visit? Hospitalized since your last visit? Yes, Hernia operation on his right side on 19 at [facility].     2. Have you seen or consulted any other health care providers outside of the [address] [place] [address] since your last visit? Include any pap smears or colon screening. No      Health Maintenance Topics with due status: Overdue       Topic Date Due    DTaP/Tdap/Td series 1956    Shingrix Vaccine Age 50> 1985    GLAUCOMA SCREENING Q2Y 2000    Pneumococcal 65+ years 2000     Health Maintenance Topics with due status: Not Due       Topic Last Completion Date    MEDICARE YEARLY EXAM 2018    Influenza Age 5 to Adult Not Due           Medication reconciliation up to date and corrected with patient at this time. Today's provider has been notified of reason for visit, vitals and flowsheets obtained on patients. Reviewed record in preparation for visit, huddled with provider and have obtained necessary documentation.         Wt Readings from Last 3 Encounters:   19 182 lb 3.2 oz (82.6 kg)   19 182 lb 12.8 oz (82.9 kg)   19 179 lb 10.8 oz (81.5 kg)     Temp Readings from Last 3 Encounters:   19 97.8 °F (36.6 °C) (Oral)   19 97.8 °F (36.6 °C) (Oral)   19 97.6 °F (36.4 °C)     BP Readings from Last 3 Encounters:   19 164/69   19 159/72   19 160/66     Pulse Readings from Last 3 Encounters:   19 72   19 69   19 79     Vitals:    19 1325   BP: 164/69   Pulse: 72   Resp: 18   Temp: 97.8 °F (36.6 °C)   TempSrc: Oral   SpO2: 98%   Weight: 182 lb 3.2 oz (82.6 kg)   Height: 5' 9\" (1.753 m)   PainSc:   0 - No pain         Learning Assessment:  :     Learning Assessment 11/10/2017 PRIMARY LEARNER Patient   HIGHEST LEVEL OF EDUCATION - PRIMARY LEARNER  SOME COLLEGE   BARRIERS PRIMARY LEARNER NONE   CO-LEARNER CAREGIVER No   PRIMARY LANGUAGE ENGLISH   LEARNER PREFERENCE PRIMARY DEMONSTRATION   ANSWERED BY patient   RELATIONSHIP SELF       Depression Screening:  :     3 most recent PHQ Screens 6/27/2019   Little interest or pleasure in doing things Not at all   Feeling down, depressed, irritable, or hopeless Not at all   Total Score PHQ 2 0       No flowsheet data found. Fall Risk Assessment:  :     Fall Risk Assessment, last 12 mths 6/27/2019   Able to walk? Yes   Fall in past 12 months? No       Abuse Screening:  :     Abuse Screening Questionnaire 6/27/2019 12/20/2018 7/2/2018 6/20/2018   Do you ever feel afraid of your partner? N N N N   Are you in a relationship with someone who physically or mentally threatens you? N N N N   Is it safe for you to go home?  Y Y Y -       ADL Screening:  :     ADL Assessment 6/27/2019   Feeding yourself No Help Needed   Getting from bed to chair No Help Needed   Getting dressed No Help Needed   Bathing or showering No Help Needed   Walk across the room (includes cane/walker) No Help Needed   Using the telphone No Help Needed   Taking your medications No Help Needed   Preparing meals No Help Needed   Managing money (expenses/bills) No Help Needed   Moderately strenuous housework (laundry) No Help Needed   Shopping for personal items (toiletries/medicines) No Help Needed   Shopping for groceries No Help Needed   Driving No Help Needed   Climbing a flight of stairs No Help Needed   Getting to places beyond walking distances No Help Needed

## 2019-06-27 NOTE — PROGRESS NOTES
To: Jeff Hoang MD    From: Gifty Granados MD    Thank you for referring Ken Garg. A pleasure meeting him. Amazing -- never even took a single ibuprofen I prescribed. .. Encounter Date: 6/27/2019    Subjective:      eKn Garg is a 80 y.o. male presents for postop care. Has no complaints today. Feels great. Eating a regular diet without difficulty. Bowel movements are regular. Objective:     General:  alert, cooperative, no distress, appears stated age   Abdomen: soft, bowel sounds active, non-tender   Incision(s):  healing well, no drainage, no erythema, repair intact with vigorous valsalva. Assessment:     S/p laparoscopic right inguinal hernia repair. Doing well postoperatively. Plan:     Reminded of activity restrictions documented on discharge instructions. Patient understands no further follow-up required. Told to call for any concerns.       Gifty Granados MD

## 2019-06-27 NOTE — Clinical Note
6/27/19 Patient: Wagner Bush YOB: 1935 Date of Visit: 6/27/2019 Tianna De La Garza MD 
[facility] [address] [address] [facility] [address] [address] VIA In Basket Dear Tianna De La Garza MD, Thank you for referring Mr. Wagner Bush to  [facility] for evaluation. My notes for this consultation are attached. If you have questions, please do not hesitate to call me. I look forward to following your patient along with you.  
 
 
Sincerely, 
 
Shellia Goodpasture, MD

## 2019-06-27 NOTE — PROGRESS NOTES
Chief Complaint   Patient presents with    Surgical Follow-up     3w f/u hernia repair 6/5/19. 1. Have you been to the ER, urgent care clinic since your last visit? Hospitalized since your last visit? No    2. Have you seen or consulted any other health care providers outside of the [address] [place] [address] since your last visit? Include any pap smears or colon screening.  No

## 2019-07-01 DIAGNOSIS — F41.9 ANXIETY: ICD-10-CM

## 2019-07-01 RX ORDER — ALPRAZOLAM 0.5 MG/1
TABLET ORAL
Qty: 90 TAB | Refills: 0 | Status: SHIPPED | OUTPATIENT
Start: 2019-07-01 | End: 2019-09-19 | Stop reason: SDUPTHER

## 2019-07-01 NOTE — TELEPHONE ENCOUNTER
Last filled: 4/3/19 for #90.     PCP: Justo Hyde MD    Last appt: 6/27/2019  Future Appointments   Date Time Provider Diane Fisher   12/26/2019  2:15 PM Justo Hyde MD [address]       Requested Prescriptions     Pending Prescriptions Disp Refills    ALPRAZolam (XANAX) 0.5 mg tablet [Pharmacy Med Name: ALPRAZOLAM 0.5 MG TABLET] 90 Tab      Sig: TAKE 1 TABLET BY MOUTH 3 TIMES A DAY AS NEEDED FOR ANXIETY       Prior labs and Blood pressures:  BP Readings from Last 3 Encounters:   06/27/19 130/70   06/27/19 159/72   06/05/19 160/66     Lab Results   Component Value Date/Time    Sodium 142 05/29/2019 09:01 AM    Potassium 4.9 05/29/2019 09:01 AM    Chloride 109 (H) 05/29/2019 09:01 AM    CO2 33 (H) 05/29/2019 09:01 AM    Anion gap 0 (L) 05/29/2019 09:01 AM    Glucose 101 (H) 05/29/2019 09:01 AM    BUN 11 05/29/2019 09:01 AM    Creatinine 0.82 05/29/2019 09:01 AM    BUN/Creatinine ratio 13 05/29/2019 09:01 AM    GFR est AA >60 05/29/2019 09:01 AM    GFR est non-AA >60 05/29/2019 09:01 AM    Calcium 9.0 05/29/2019 09:01 AM     Lab Results   Component Value Date/Time    Hemoglobin A1c 5.4 08/03/2018 03:31 PM     Lab Results   Component Value Date/Time    Cholesterol, total 183 12/14/2018 09:10 AM    Cholesterol (POC) 176.0 12/21/2017 10:53 AM    HDL Cholesterol 50 12/14/2018 09:10 AM    HDL Cholesterol (POC) 52.0 12/21/2017 10:53 AM    LDL Cholesterol (POC) 101.2 12/21/2017 10:53 AM    LDL, calculated 119 (H) 12/14/2018 09:10 AM    VLDL, calculated 14 12/14/2018 09:10 AM    Triglyceride 71 12/14/2018 09:10 AM    Triglycerides (POC) 114.0 12/21/2017 10:53 AM     Lab Results   Component Value Date/Time    Vitamin D 25-Hydroxy 28.9 (L) 08/03/2018 03:31 PM       No results found for: TSH, TSH2, TSH3, TSHP, TSHEXT

## 2019-07-22 DIAGNOSIS — M48.061 SPINAL STENOSIS OF LUMBAR REGION, UNSPECIFIED WHETHER NEUROGENIC CLAUDICATION PRESENT: Primary | ICD-10-CM

## 2019-07-22 RX ORDER — GABAPENTIN 400 MG/1
400 CAPSULE ORAL 3 TIMES DAILY
Qty: 6 CAP | Refills: 0 | Status: SHIPPED | OUTPATIENT
Start: 2019-07-22 | End: 2019-07-23 | Stop reason: SDUPTHER

## 2019-07-22 NOTE — TELEPHONE ENCOUNTER
Requested Prescriptions     Pending Prescriptions Disp Refills    gabapentin (NEURONTIN) 400 mg capsule 6 Cap 0     Sig: Take 1 Cap by mouth three (3) times daily. Max Daily Amount: 1,200 mg. LOV 6/27/19  NOV 12/26/19  LF - unknown  **patients wife, Fili Torres is calling she advises that Dr. Donna Mendoza (prescriber of requested medication) is in surgery and will not be back in the office until Wednesday. She asked if another physician could refill the medication in his office and was told to call the patients PCP; she is requesting 6 pills be refilled until Dr. Donna Mendoza comes back.

## 2019-07-23 DIAGNOSIS — M48.061 SPINAL STENOSIS OF LUMBAR REGION, UNSPECIFIED WHETHER NEUROGENIC CLAUDICATION PRESENT: ICD-10-CM

## 2019-07-23 RX ORDER — GABAPENTIN 400 MG/1
400 CAPSULE ORAL 3 TIMES DAILY
Qty: 30 CAP | Refills: 0 | Status: SHIPPED | OUTPATIENT
Start: 2019-07-23 | End: 2019-07-30 | Stop reason: SDUPTHER

## 2019-07-30 DIAGNOSIS — M48.061 SPINAL STENOSIS OF LUMBAR REGION, UNSPECIFIED WHETHER NEUROGENIC CLAUDICATION PRESENT: ICD-10-CM

## 2019-07-30 RX ORDER — GABAPENTIN 400 MG/1
400 CAPSULE ORAL 3 TIMES DAILY
Qty: 90 CAP | Refills: 3 | Status: SHIPPED | OUTPATIENT
Start: 2019-07-30 | End: 2019-10-28 | Stop reason: SDUPTHER

## 2019-07-30 NOTE — TELEPHONE ENCOUNTER
PCP: Андрей Marrufo MD    Last appt: 6/27/2019  Future Appointments   Date Time Provider Diane Fisher   10/30/2019  2:00 PM Андрей Marrufo MD [facility] [address]   12/26/2019  2:15 PM Андрей Marrufo MD [address] [place]       Requested Prescriptions     Pending Prescriptions Disp Refills    gabapentin (NEURONTIN) 400 mg capsule 90 Cap 0     Sig: Take 1 Cap by mouth three (3) times daily for 10 days.  Max Daily Amount: 1,200 mg.       Prior labs and Blood pressures:  BP Readings from Last 3 Encounters:   06/27/19 130/70   06/27/19 159/72   06/05/19 160/66     Lab Results   Component Value Date/Time    Sodium 142 05/29/2019 09:01 AM    Potassium 4.9 05/29/2019 09:01 AM    Chloride 109 (H) 05/29/2019 09:01 AM    CO2 33 (H) 05/29/2019 09:01 AM    Anion gap 0 (L) 05/29/2019 09:01 AM    Glucose 101 (H) 05/29/2019 09:01 AM    BUN 11 05/29/2019 09:01 AM    Creatinine 0.82 05/29/2019 09:01 AM    BUN/Creatinine ratio 13 05/29/2019 09:01 AM    GFR est AA >60 05/29/2019 09:01 AM    GFR est non-AA >60 05/29/2019 09:01 AM    Calcium 9.0 05/29/2019 09:01 AM     Lab Results   Component Value Date/Time    Hemoglobin A1c 5.4 08/03/2018 03:31 PM     Lab Results   Component Value Date/Time    Cholesterol, total 183 12/14/2018 09:10 AM    Cholesterol (POC) 176.0 12/21/2017 10:53 AM    HDL Cholesterol 50 12/14/2018 09:10 AM    HDL Cholesterol (POC) 52.0 12/21/2017 10:53 AM    LDL Cholesterol (POC) 101.2 12/21/2017 10:53 AM    LDL, calculated 119 (H) 12/14/2018 09:10 AM    VLDL, calculated 14 12/14/2018 09:10 AM    Triglyceride 71 12/14/2018 09:10 AM    Triglycerides (POC) 114.0 12/21/2017 10:53 AM     Lab Results   Component Value Date/Time    Vitamin D 25-Hydroxy 28.9 (L) 08/03/2018 03:31 PM       No results found for: TSH, TSH2, TSH3, TSHP, TSHEXT

## 2019-07-30 NOTE — TELEPHONE ENCOUNTER
Requested Prescriptions     Pending Prescriptions Disp Refills    gabapentin (NEURONTIN) 400 mg capsule 90 Cap 3     Sig: Take 1 Cap by mouth three (3) times daily for 10 days. Max Daily Amount: 1,200 mg.        Last Refill: 7/23/19 (only a qty of 30 was given)  Next Appointment:10/30/19

## 2019-09-19 DIAGNOSIS — F41.9 ANXIETY: ICD-10-CM

## 2019-09-19 RX ORDER — ALPRAZOLAM 0.5 MG/1
TABLET ORAL
Qty: 90 TAB | Refills: 0 | Status: SHIPPED | OUTPATIENT
Start: 2019-09-19 | End: 2019-12-23

## 2019-09-19 NOTE — TELEPHONE ENCOUNTER
Last filled: 7/1/19    PCP: Panda Dickerson MD    Last appt: 6/27/2019  Future Appointments   Date Time Provider Diane Fisher   10/30/2019  2:00 PM Panda Dickerson MD [address]   12/26/2019  2:15 PM Panda Dickerson MD [address]       Requested Prescriptions     Pending Prescriptions Disp Refills    ALPRAZolam (XANAX) 0.5 mg tablet 90 Tab 0     Sig: TAKE 1 TABLET BY MOUTH 3 TIMES A DAY AS NEEDED FOR ANXIETY       Prior labs and Blood pressures:  BP Readings from Last 3 Encounters:   06/27/19 130/70   06/27/19 159/72   06/05/19 160/66     Lab Results   Component Value Date/Time    Sodium 142 05/29/2019 09:01 AM    Potassium 4.9 05/29/2019 09:01 AM    Chloride 109 (H) 05/29/2019 09:01 AM    CO2 33 (H) 05/29/2019 09:01 AM    Anion gap 0 (L) 05/29/2019 09:01 AM    Glucose 101 (H) 05/29/2019 09:01 AM    BUN 11 05/29/2019 09:01 AM    Creatinine 0.82 05/29/2019 09:01 AM    BUN/Creatinine ratio 13 05/29/2019 09:01 AM    GFR est AA >60 05/29/2019 09:01 AM    GFR est non-AA >60 05/29/2019 09:01 AM    Calcium 9.0 05/29/2019 09:01 AM     Lab Results   Component Value Date/Time    Hemoglobin A1c 5.4 08/03/2018 03:31 PM     Lab Results   Component Value Date/Time    Cholesterol, total 183 12/14/2018 09:10 AM    Cholesterol (POC) 176.0 12/21/2017 10:53 AM    HDL Cholesterol 50 12/14/2018 09:10 AM    HDL Cholesterol (POC) 52.0 12/21/2017 10:53 AM    LDL Cholesterol (POC) 101.2 12/21/2017 10:53 AM    LDL, calculated 119 (H) 12/14/2018 09:10 AM    VLDL, calculated 14 12/14/2018 09:10 AM    Triglyceride 71 12/14/2018 09:10 AM    Triglycerides (POC) 114.0 12/21/2017 10:53 AM     Lab Results   Component Value Date/Time    Vitamin D 25-Hydroxy 28.9 (L) 08/03/2018 03:31 PM       No results found for: TSH, TSH2, TSH3, TSHP, TSHEXT

## 2019-10-28 DIAGNOSIS — M48.061 SPINAL STENOSIS OF LUMBAR REGION, UNSPECIFIED WHETHER NEUROGENIC CLAUDICATION PRESENT: ICD-10-CM

## 2019-10-28 RX ORDER — GABAPENTIN 400 MG/1
CAPSULE ORAL
Qty: 90 CAP | Refills: 3 | Status: SHIPPED | OUTPATIENT
Start: 2019-10-28 | End: 2019-12-26 | Stop reason: SDUPTHER

## 2019-10-30 ENCOUNTER — OFFICE VISIT (OUTPATIENT)
Dept: INTERNAL MEDICINE CLINIC | Age: 84
End: 2019-10-30

## 2019-10-30 VITALS
WEIGHT: 184 LBS | OXYGEN SATURATION: 98 % | TEMPERATURE: 98 F | SYSTOLIC BLOOD PRESSURE: 134 MMHG | DIASTOLIC BLOOD PRESSURE: 78 MMHG | HEIGHT: 69 IN | HEART RATE: 76 BPM | BODY MASS INDEX: 27.25 KG/M2 | RESPIRATION RATE: 16 BRPM

## 2019-10-30 DIAGNOSIS — M48.061 SPINAL STENOSIS OF LUMBAR REGION, UNSPECIFIED WHETHER NEUROGENIC CLAUDICATION PRESENT: ICD-10-CM

## 2019-10-30 DIAGNOSIS — I10 ESSENTIAL HYPERTENSION: Primary | ICD-10-CM

## 2019-10-30 DIAGNOSIS — R53.83 FATIGUE, UNSPECIFIED TYPE: ICD-10-CM

## 2019-10-30 NOTE — PROGRESS NOTES
This note will not be viewable in [address] Kenney Almodovar is a 80 y.o. male and presents with Hypertension (follow up) and Pain (Chronic) (back pain - follow up)  . Subjective:    Mr. Sonia Iglesias presents today for follow-up of hypertension and chronic back pain. He remains on Neurontin this medication. He takes tizanidine for periodic muscle spasms. He also takes Xanax at nighttime. He denies shortness of breath, chest pain, palpitations, PND, orthopnea, or pedal edema. He has some back stiffness but generally this is improved with activity. He denies radiation of pain into his lower extremities. He denies any change in bowel or bladder continence. Review of Systems  Constitutional:   Eyes:   negative for visual disturbance and irritation  ENT:   negative for tinnitus,sore throat,nasal congestion,ear pains. hoarseness  Respiratory:  negative for cough, hemoptysis, dyspnea,wheezing  CV:   negative for chest pain, palpitations, lower extremity edema  GI:   negative for nausea, vomiting, diarrhea, abdominal pain,melena  Endo:               negative for polyuria,polydipsia,polyphagia,heat intolerance  Genitourinary: negative for frequency, dysuria and hematuria  Integumentary: negative for rash and pruritus  Hematologic:  negative for easy bruising and gum/nose bleeding  Musculoskel: negative for myalgias, arthralgias,  muscle weakness, joint pain  Neurological:  negative for headaches, dizziness, vertigo, memory problems and gait   Behavl/Psych: negative for feelings of anxiety, depression, mood changes    Past Medical History:   Diagnosis Date    Allergic rhinitis 12/15/2017    Arthritis of knee, left 12/15/2017    Aseptic meningitis     Chronic pain     chronic back pain    Colon polyps     Constipation 12/15/2017    Dysphagia 12/15/2017    Elevated blood pressure reading 12/15/2017    Fatigue 12/15/2017    Headache 12/15/2017    Hematuria 12/15/2017    Herpes zoster dermatitis 12/15/2017    Hyperkalemia 12/15/2017    Hypertension 12/15/2017    Insomnia 12/15/2017    Osteoarthritis 12/15/2017    Otitis externa 12/15/2017    Prostate cancer screening 12/15/2017    Right groin hernia     Sciatica 12/15/2017    Sciatica of left side associated with disorder of lumbosacral spine 12/15/2017    Shingles     Spinal stenosis 12/15/2017     Past Surgical History:   Procedure Laterality Date    COLONOSCOPY N/A 11/29/2018    COLONOSCOPY performed by Rebecca Cordon MD at \[facility]\"" ENDOSCOPY    COLONOSCOPY,DIAGNOSTIC  11/11/2014         COLONOSCOPY,DIAGNOSTIC  11/29/2018         HX CATARACT REMOVAL Bilateral     with lens implants    HX COLONOSCOPY      HX HERNIA REPAIR Left     HX HERNIA REPAIR  06/05/2019    HX ORTHOPAEDIC  2008    rods placed in back    HX ORTHOPAEDIC  10/6/15    LEFT L5-S1 MICRODISCECTOMY     HX ORTHOPAEDIC  08/2018    glynn replacement, by Dr. Darek Dominguez      steroid injection for back pain    HX TONSILLECTOMY      NE EGD INSERT GUIDE WIRE DILATOR PASSAGE ESOPHAGUS  10/17/2013         NE EGD TRANSORAL BIOPSY SINGLE/MULTIPLE  10/17/2013          Social History     Socioeconomic History    Marital status:      Spouse name: Not on file    Number of children: Not on file    Years of education: Not on file    Highest education level: Not on file   Tobacco Use    Smoking status: Never Smoker    Smokeless tobacco: Never Used   Substance and Sexual Activity    Alcohol use: Never     Frequency: Never    Drug use: Never     Family History   Problem Relation Age of Onset    Lung Disease Mother         lung cancer    Hypertension Mother     Heart Failure Mother     Hypertension Father     Heart Failure Father      Current Outpatient Medications   Medication Sig Dispense Refill    gabapentin (NEURONTIN) 400 mg capsule TAKE ONE CAPSULE BY MOUTH 3 TIMES A DAY .  MAX DAILY AMOUNT:1,200MG 90 Cap 3    tiZANidine (ZANAFLEX) 4 mg tablet TAKE 1 TABLET BY MOUTH EVERY DAY AT NIGHT 90 Tab 3    ALPRAZolam (XANAX) 0.5 mg tablet TAKE 1 TABLET BY MOUTH 3 TIMES A DAY AS NEEDED FOR ANXIETY 90 Tab 0    polyethylene glycol (MIRALAX) 17 gram/dose powder Take 17 g by mouth daily. 510 g 0    Wheat Dextrin (BENEFIBER CLEAR) 3 gram/3.5 gram pwpk Take 1 Each by mouth daily.  amLODIPine (NORVASC) 5 mg tablet Take 1 Tab by mouth daily. 90 Tab 3    vit C/E/Zn/coppr/lutein/zeaxan (PRESERVISION AREDS 2 PO) Take 1 Tab by mouth two (2) times a day.  acetaminophen (TYLENOL) 325 mg tablet Take 650 mg by mouth every four (4) hours as needed for Pain.  ibuprofen (MOTRIN) 600 mg tablet Take 1 Tab by mouth three (3) times daily (with meals). 21 Tab 0    naloxone (NARCAN) 4 mg/actuation nasal spray 1 Scott by IntraNASal route as needed (respiratory depression). Give single spray into one nostril. Call 911. Give additional doses every 2 to 3 minutes alternating nostrils until assistance arrives using a new nasal spray with each dose, if patient does not respond or responds and then relapses.  (Patient not taking: Reported on 6/27/2019) 1 Each 0     No Known Allergies    Objective:  Visit Vitals  /78 (BP 1 Location: Right arm, BP Patient Position: Sitting)   Pulse 76   Temp 98 °F (36.7 °C) (Oral)   Resp 16   Ht 5' 9\" (1.753 m)   Wt 184 lb (83.5 kg)   SpO2 98%   BMI 27.17 kg/m²     Physical Exam:   General appearance - alert, well appearing, and in no distress  Mental status - alert, oriented to person, place, and time  EYE-TRACY, EOMI, fundi normal, corneas normal, no foreign bodies  ENT-ENT exam normal, no neck nodes or sinus tenderness  Nose - normal and patent, no erythema, discharge or polyps  Mouth - mucous membranes moist, pharynx normal without lesions  Neck - supple, no significant adenopathy   Chest - clear to auscultation, no wheezes, rales or rhonchi, symmetric air entry   Heart - normal rate, regular rhythm, normal S1, S2, no murmurs, rubs, clicks or gallops Abdomen - soft, nontender, nondistended, no masses or organomegaly  Lymph- no adenopathy palpable  Ext-peripheral pulses normal, no pedal edema, no clubbing or cyanosis  Skin-Warm and dry. no hyperpigmentation, vitiligo, or suspicious lesions  Neuro -alert, oriented, normal speech, no focal findings or movement disorder noted  Musculoskeletal- FROM, no bony abnormalities, no point tenderness    No results found for this visit on 10/30/19. All results for lab orders may not have been returned by the time this encountered was closed. Assessment/Plan:       ICD-10-CM ICD-9-CM    1. Essential hypertension I10 401.9 LIPID PANEL      METABOLIC PANEL, COMPREHENSIVE      URINALYSIS W/O MICRO   2. Spinal stenosis of lumbar region, unspecified whether neurogenic claudication present M48.061 724.02    3. Fatigue, unspecified type R53.83 780.79 CBC WITH AUTOMATED DIFF       Orders Placed This Encounter    CBC WITH AUTOMATED DIFF ([facility])     Standing Status:   Future     Standing Expiration Date:   1/22/2020    LIPID PANEL ([facility])     Standing Status:   Future     Standing Expiration Date:   8/83/9171    METABOLIC PANEL, COMPREHENSIVE ([facility])     Standing Status:   Future     Standing Expiration Date:   1/22/2020    URINALYSIS W/O MICRO ([facility])     Standing Status:   Future     Standing Expiration Date:   1/22/2020       Follow-up and Dispositions    · Return for as scheduled. Plan:    Continue current medical regimen as outlined above. Return to clinic in December for follow-up exam.     I have reviewed with the patient details of the assessment and plan and all questions were answered. Relevent patient education was performed. Verbal and/or written instructions (see AVS) provided. The most recent lab findings were reviewed with the patient. Plan was discussed with patient who verbal expressed understanding.     An After Visit Summary was printed and given to the patient.       Newton Stephen MD

## 2019-10-30 NOTE — PROGRESS NOTES
Kathie Patel presents today at the clinic for    Chief Complaint   Patient presents with    Hypertension     follow up    Pain (Chronic)     back pain - follow up        Wt Readings from Last 3 Encounters:   10/30/19 184 lb (83.5 kg)   06/27/19 182 lb 3.2 oz (82.6 kg)   06/27/19 182 lb 12.8 oz (82.9 kg)     Temp Readings from Last 3 Encounters:   10/30/19 98 °F (36.7 °C) (Oral)   06/27/19 97.8 °F (36.6 °C) (Oral)   06/27/19 97.8 °F (36.6 °C) (Oral)     BP Readings from Last 3 Encounters:   10/30/19 142/74   06/27/19 130/70   06/27/19 159/72     Pulse Readings from Last 3 Encounters:   10/30/19 76   06/27/19 72   06/27/19 69       Health Maintenance Due   Topic    DTaP/Tdap/Td series (1 - Tdap)    Shingrix Vaccine Age 50> (1 of 2)    GLAUCOMA SCREENING Q2Y     Pneumococcal 65+ years (1 of 2 - PCV13)    Influenza Age 5 to Adult          Learning Assessment:  :     Learning Assessment 11/10/2017   PRIMARY LEARNER Patient   HIGHEST LEVEL OF EDUCATION - PRIMARY LEARNER  SOME COLLEGE   BARRIERS PRIMARY LEARNER NONE   CO-LEARNER CAREGIVER No   PRIMARY LANGUAGE ENGLISH   LEARNER PREFERENCE PRIMARY DEMONSTRATION   ANSWERED BY patient   RELATIONSHIP SELF       Depression Screening:  :     3 most recent PHQ Screens 6/27/2019   Little interest or pleasure in doing things Not at all   Feeling down, depressed, irritable, or hopeless Not at all   Total Score PHQ 2 0       Fall Risk Assessment:  :     Fall Risk Assessment, last 12 mths 6/27/2019   Able to walk? Yes   Fall in past 12 months? No       Abuse Screening:  :     Abuse Screening Questionnaire 6/27/2019 12/20/2018 7/2/2018 6/20/2018   Do you ever feel afraid of your partner? N N N N   Are you in a relationship with someone who physically or mentally threatens you? N N N N   Is it safe for you to go home? Y Y Y -       Coordination of Care Questionnaire:  :     1. Have you been to the ER, urgent care clinic since your last visit?   Hospitalized since your last visit? no    2. Have you seen or consulted any other health care providers outside of the [address] [place] [address] since your last visit? Include any pap smears or colon screening. Patient sees Dr Bairon Early for his back pain. Patient refused flu shot.

## 2019-10-30 NOTE — LETTER
Name:Alisha Beaulieu RFU:[birth year] MR #:[id number] Provider Juliette Ferrer MD  
*[id number]* BSMG-491 (5/16) Page 1 of 5 Initial [facility] CONTROLLED SUBSTANCE AGREEMENT I may be prescribed medications that are controlled substances as part  of my treatment plan for management of my medical condition(s). The goal of my treatment plan is to maintain and/or improve my health and wellbeing. Because controlled substances have an increased risk of abuse or harm, continual re-evaluation is needed determine if the goals of my treatment plan are being met for my safety and the safety of others. Sherren Lady  am entering into this Controlled Substance Agreement with my provider, Slime Rock MD at [address] [place] [address] . I understand that successful treatment requires mutual trust and honesty between me and my provider. I understand that there are state and federal laws and regulations which apply to the medications that my provider may prescribe that must be followed. I understand there are risks and benefits ts of taking the medicines that my provider may prescribe. I understand and agree that following this Agreement is necessary in continuing my provider-patient relationship and success of my treatment plan. As a part of my treatment plan, I agree to the following: COMMUNICATION: 
 
1. I will communicate fully with my provider about my medical condition(s), including the effect on my daily life and how well my medications are helping. I will tell my provider all of the medications that I take for any reason, including medications I receive from another health care provider, and will notify my provider about all issues, problems or concerns, including any side effects, which may be related to my medications.  
 
I understand that this information allows my provider to adjust my treatment plan to help manage my medical condition. I understand that this information will become part of my permanent medical record. 2. I will notify my provider if I have a history of alcohol/drug misuse/addiction or if I have had treatment for alcohol/drug addiction in the past, or if I have a new problem with or concern about alcohol/drug use/addiction, because this increases the likelihood of high risk behaviors and may lead to serious medical conditions. 3. Females Only: I will notify my provider if I am or become pregnant, or if I intend to become pregnant, or if I intend to breastfeed. I understand that communication of these issues with my provider is important, due to possible effects my medication could have on an unborn fetus or breastfeeding child. Name:.Juan Mcgovern TPY:[birth year] MR #:[id number] Provider Clint Gomes MD  
*[id number]* BSMG-491 (5/16) Page 2 of 5 Initial [facility] MISUSE OF MEDICATIONS / DRUGS: 
 
1. I agree to take all controlled substances as prescribed, and will not misuse or abuse any controlled substances prescribed by my provider. For my safety, I will not increase the amount of medicine I take without first talking with and getting permission from my provider. 2. If I have a medical emergency, another health care provider may prescribe me medication. If I seek emergency treatment, I will notify my provider within seventy-two (72) hours. 3. I understand that my provider may discuss my use and/or possible misuse/abuse of controlled substances and alcohol, as appropriate, with any health care provider involved in my care, pharmacist or legal authority. ILLEGAL DRUGS: 
 
1. I will not use illegal drugs of any kind, including but not limited to marijuana, heroin, cocaine, or any prescription drug which is not prescribed to me.  
 
DRUG DIVERSION / PRESCRIPTION FRAUD: 
 
 1. I will not share, sell, trade, give away, or otherwise misuse my prescriptions or medications. 2. I will not alter any prescriptions provided to me by my provider. SINGLE PROVIDER: 
 
1. I agree that all controlled substances that I take will be prescribed only by my provider (or his/her covering provider) under this Agreement. This agreement does not prevent me from seeking emergency medical treatment or receiving pain management related to a surgery. PROTECTING MEDICATIONS: 
 
1. I am responsible for keeping my prescriptions and medications in a safe and secure place including safeguarding them from loss or theft. I understand that lost, stolen or damaged/destroyed prescriptions or medications will not be replaced. Name:.Juan Jones Plaster CZV:[birth year] MR #:[id number] Provider Arita Essex, MD  
*[id number]* BSMG-491 (5/16) Page 3 of 5 Initial [facility] PRESCRIPTION RENEWALS/REFILLS: 
 
 
1. I authorize my provider and my pharmacy to cooperate fully with any local, state, or federal law enforcement agency in the investigation of any possible misuse, sale, or other diversion of my controlled substance prescriptions or medications. RISKS: 
 
 
1. I understand that if I do not adhere to this Agreement in any way, my provider may change my prescriptions, stop prescribing controlled substances or end our provider-patient relationship. 2. If my provider decides to stop prescribing medication, or decides to end our provider-patient relationship,my provider may require that I taper my medications slowly. If necessary, my provider may also provide a prescription for other medications to treat my withdrawal symptoms. UNDERSTANDING THIS AGREEMENT: 
 
I understand that my provider may adjust or stop my prescriptions for controlled substances based on my medical condition and my treatment plan. I understand that this Agreement does not guarantee that I will be prescribed medications or controlled substances. I understand that controlled substances may be just one part of my treatment plan. My initial on each page and my signature below shows that I have read each page of this Agreement, I have had an opportunity to ask questions, and all of my questions have been answered to my satisfaction by my provider. By signing below, I agree to comply with this Agreement, and I understand that if I do not follow the Agreements listed above, my provider may stop 
 
 
 
_________________________________________  Date/Time 10/30/2019 2:21 PM   
             (Patient Signature)

## 2019-12-05 ENCOUNTER — LAB ONLY (OUTPATIENT)
Dept: INTERNAL MEDICINE CLINIC | Age: 84
End: 2019-12-05

## 2019-12-05 DIAGNOSIS — R53.83 FATIGUE, UNSPECIFIED TYPE: ICD-10-CM

## 2019-12-05 DIAGNOSIS — I10 ESSENTIAL HYPERTENSION: ICD-10-CM

## 2019-12-05 DIAGNOSIS — N39.0 URINARY TRACT INFECTION WITHOUT HEMATURIA, SITE UNSPECIFIED: Primary | ICD-10-CM

## 2019-12-05 LAB
A-G RATIO,AGRAT: 1.7 RATIO
ALBUMIN SERPL-MCNC: 4 G/DL (ref 3.9–5.4)
ALP SERPL-CCNC: 70 U/L (ref 38–126)
ALT SERPL-CCNC: 14 U/L (ref 0–50)
ANION GAP SERPL CALC-SCNC: 6 MMOL/L
AST SERPL W P-5'-P-CCNC: 19 U/L (ref 14–36)
BACTERIA,BACTU: ABNORMAL
BILIRUB SERPL-MCNC: 0.6 MG/DL (ref 0.2–1.3)
BILIRUB UR QL: NEGATIVE
BUN SERPL-MCNC: 15 MG/DL (ref 9–20)
BUN/CREATININE RATIO,BUCR: 21 RATIO
CALCIUM SERPL-MCNC: 9.7 MG/DL (ref 8.4–10.2)
CHLORIDE SERPL-SCNC: 103 MMOL/L (ref 98–107)
CHOL/HDL RATIO,CHHD: 4 RATIO (ref 0–4)
CHOLEST SERPL-MCNC: 172 MG/DL (ref 0–200)
CLARITY: CLEAR
CO2 SERPL-SCNC: 31 MMOL/L (ref 22–32)
COLOR UR: ABNORMAL
CREAT SERPL-MCNC: 0.7 MG/DL (ref 0.8–1.5)
GLOBULIN,GLOB: 2.4
GLUCOSE 24H UR-MRATE: NEGATIVE G/(24.H)
GLUCOSE SERPL-MCNC: 96 MG/DL (ref 75–110)
HDLC SERPL-MCNC: 46 MG/DL (ref 35–130)
HGB UR QL STRIP: ABNORMAL
KETONES UR QL STRIP.AUTO: NEGATIVE
LDL/HDL RATIO,LDHD: 2 RATIO
LDLC SERPL CALC-MCNC: 110 MG/DL (ref 0–130)
LEUKOCYTE ESTERASE: NEGATIVE
NITRITE UR QL STRIP.AUTO: NEGATIVE
PH UR STRIP: 6 [PH] (ref 5–7)
POTASSIUM SERPL-SCNC: 4.8 MMOL/L (ref 3.6–5)
PROT SERPL-MCNC: 6.4 G/DL (ref 6.3–8.2)
PROT UR STRIP-MCNC: NEGATIVE MG/DL
RBC #/AREA URNS HPF: ABNORMAL #/HPF
SODIUM SERPL-SCNC: 140 MMOL/L (ref 137–145)
SP GR UR REFRACTOMETRY: 1.01 (ref 1–1.03)
TRIGL SERPL-MCNC: 80 MG/DL (ref 0–200)
UROBILINOGEN UR QL STRIP.AUTO: NEGATIVE
VLDLC SERPL CALC-MCNC: 16 MG/DL
WBC URNS QL MICRO: ABNORMAL #/HPF

## 2019-12-06 LAB
BASOPHILS # BLD AUTO: 0 X10E3/UL (ref 0–0.2)
BASOPHILS NFR BLD AUTO: 1 %
EOSINOPHIL # BLD AUTO: 0.1 X10E3/UL (ref 0–0.4)
EOSINOPHIL NFR BLD AUTO: 2 %
ERYTHROCYTE [DISTWIDTH] IN BLOOD BY AUTOMATED COUNT: 12 % (ref 12.3–15.4)
HCT VFR BLD AUTO: 39.9 % (ref 37.5–51)
HGB BLD-MCNC: 13.3 G/DL (ref 13–17.7)
IMM GRANULOCYTES # BLD AUTO: 0 X10E3/UL (ref 0–0.1)
IMM GRANULOCYTES NFR BLD AUTO: 0 %
LYMPHOCYTES # BLD AUTO: 1.9 X10E3/UL (ref 0.7–3.1)
LYMPHOCYTES NFR BLD AUTO: 29 %
MCH RBC QN AUTO: 30.9 PG (ref 26.6–33)
MCHC RBC AUTO-ENTMCNC: 33.3 G/DL (ref 31.5–35.7)
MCV RBC AUTO: 93 FL (ref 79–97)
MONOCYTES # BLD AUTO: 0.6 X10E3/UL (ref 0.1–0.9)
MONOCYTES NFR BLD AUTO: 9 %
NEUTROPHILS # BLD AUTO: 4 X10E3/UL (ref 1.4–7)
NEUTROPHILS NFR BLD AUTO: 59 %
PLATELET # BLD AUTO: 254 X10E3/UL (ref 150–450)
RBC # BLD AUTO: 4.3 X10E6/UL (ref 4.14–5.8)
WBC # BLD AUTO: 6.6 X10E3/UL (ref 3.4–10.8)

## 2019-12-07 LAB — BACTERIA UR CULT: NORMAL

## 2019-12-23 DIAGNOSIS — F41.9 ANXIETY: ICD-10-CM

## 2019-12-23 RX ORDER — ALPRAZOLAM 0.5 MG/1
TABLET ORAL
Qty: 90 TAB | Refills: 0 | Status: SHIPPED | OUTPATIENT
Start: 2019-12-23 | End: 2019-12-26 | Stop reason: RX

## 2019-12-26 ENCOUNTER — OFFICE VISIT (OUTPATIENT)
Dept: INTERNAL MEDICINE CLINIC | Age: 84
End: 2019-12-26

## 2019-12-26 VITALS
TEMPERATURE: 97.5 F | HEART RATE: 72 BPM | SYSTOLIC BLOOD PRESSURE: 136 MMHG | HEIGHT: 69 IN | BODY MASS INDEX: 27.4 KG/M2 | WEIGHT: 185 LBS | OXYGEN SATURATION: 98 % | RESPIRATION RATE: 16 BRPM | DIASTOLIC BLOOD PRESSURE: 78 MMHG

## 2019-12-26 DIAGNOSIS — Z13.31 SCREENING FOR DEPRESSION: ICD-10-CM

## 2019-12-26 DIAGNOSIS — F41.9 ANXIETY: ICD-10-CM

## 2019-12-26 DIAGNOSIS — M48.061 SPINAL STENOSIS OF LUMBAR REGION, UNSPECIFIED WHETHER NEUROGENIC CLAUDICATION PRESENT: ICD-10-CM

## 2019-12-26 DIAGNOSIS — I10 ESSENTIAL HYPERTENSION: ICD-10-CM

## 2019-12-26 DIAGNOSIS — F41.9 ANXIETY: Primary | ICD-10-CM

## 2019-12-26 DIAGNOSIS — Z13.39 SCREENING FOR ALCOHOLISM: ICD-10-CM

## 2019-12-26 DIAGNOSIS — Z00.00 MEDICARE ANNUAL WELLNESS VISIT, SUBSEQUENT: Primary | ICD-10-CM

## 2019-12-26 RX ORDER — GABAPENTIN 400 MG/1
CAPSULE ORAL
Qty: 90 CAP | Refills: 3 | Status: SHIPPED | OUTPATIENT
Start: 2019-12-26 | End: 2020-01-22 | Stop reason: SDUPTHER

## 2019-12-26 RX ORDER — ALPRAZOLAM 1 MG/1
0.5 TABLET ORAL
Qty: 45 TAB | Refills: 0
Start: 2019-12-26 | End: 2020-09-28 | Stop reason: SDUPTHER

## 2019-12-26 NOTE — PATIENT INSTRUCTIONS
Medicare Wellness Visit, Male The best way to live healthy is to have a lifestyle where you eat a well-balanced diet, exercise regularly, limit alcohol use, and quit all forms of tobacco/nicotine, if applicable. Regular preventive services are another way to keep healthy. Preventive services (vaccines, screening tests, monitoring & exams) can help personalize your care plan, which helps you manage your own care. Screening tests can find health problems at the earliest stages, when they are easiest to treat. [facility] follows the current, evidence-based guidelines published by the [facility] Adin [facility] ([facility]) when recommending preventive services for our patients. Because we follow these guidelines, sometimes recommendations change over time as research supports it. (For example, a prostate screening blood test is no longer routinely recommended for men with no symptoms). Of course, you and your doctor may decide to screen more often for some diseases, based on your risk and co-morbidities (chronic disease you are already diagnosed with). Preventive services for you include: - Medicare offers their members a free annual wellness visit, which is time for you and your primary care provider to discuss and plan for your preventive service needs. Take advantage of this benefit every year! 
-All adults over age 72 should receive the recommended pneumonia vaccines. Current USPSTF guidelines recommend a series of two vaccines for the best pneumonia protection.  
-All adults should have a flu vaccine yearly and tetanus vaccine every 10 years. 
-All adults age 48 and older should receive the shingles vaccines (series of two vaccines).       
-All adults age 38-68 who are overweight should have a diabetes screening test once every three years.  
-Other screening tests & preventive services for persons with diabetes include: an eye exam to screen for diabetic retinopathy, a kidney function test, a foot exam, and stricter control over your cholesterol.  
-Cardiovascular screening for adults with routine risk involves an electrocardiogram (ECG) at intervals determined by the provider.  
-Colorectal cancer screening should be done for adults age 54-65 with no increased risk factors for colorectal cancer. There are a number of acceptable methods of screening for this type of cancer. Each test has its own benefits and drawbacks. Discuss with your provider what is most appropriate for you during your annual wellness visit. The different tests include: colonoscopy (considered the best screening method), a fecal occult blood test, a fecal DNA test, and sigmoidoscopy. 
-All adults born between [facility] should be screened once for Hepatitis C. 
-An Abdominal Aortic Aneurysm (AAA) Screening is recommended for men age 73-68 who has ever smoked in their lifetime. Here is a list of your current Health Maintenance items (your personalized list of preventive services) with a due date: 
Health Maintenance Due Topic Date Due  
 DTaP/Tdap/Td  (1 - Tdap) 05/09/1946  Shingles Vaccine (1 of 2) 05/09/1985  Glaucoma Screening   05/09/2000  Pneumococcal Vaccine (1 of 1 - PPSV23) 05/09/2000 [address] [place] [address] Annual Well Visit  12/21/2019

## 2019-12-26 NOTE — PROGRESS NOTES
Chelo Cortez presents today at the clinic for    Chief Complaint   Patient presents with    Hypertension     follow up    Anxiety     follow up        Wt Readings from Last 3 Encounters:   12/26/19 185 lb (83.9 kg)   10/30/19 184 lb (83.5 kg)   06/27/19 182 lb 3.2 oz (82.6 kg)     Temp Readings from Last 3 Encounters:   12/26/19 97.5 °F (36.4 °C) (Oral)   10/30/19 98 °F (36.7 °C) (Oral)   06/27/19 97.8 °F (36.6 °C) (Oral)     BP Readings from Last 3 Encounters:   12/26/19 136/78   10/30/19 134/78   06/27/19 130/70     Pulse Readings from Last 3 Encounters:   12/26/19 72   10/30/19 76   06/27/19 72       Health Maintenance Due   Topic    DTaP/Tdap/Td series (1 - Tdap)    Shingrix Vaccine Age 50> (1 of 2)    GLAUCOMA SCREENING Q2Y     Pneumococcal 65+ years (1 of 1 - PPSV23)    MEDICARE YEARLY EXAM          Learning Assessment:  :     Learning Assessment 11/10/2017   PRIMARY LEARNER Patient   HIGHEST LEVEL OF EDUCATION - PRIMARY LEARNER  SOME COLLEGE   BARRIERS PRIMARY LEARNER NONE   CO-LEARNER CAREGIVER No   PRIMARY LANGUAGE ENGLISH   LEARNER PREFERENCE PRIMARY DEMONSTRATION   ANSWERED BY patient   RELATIONSHIP SELF       Depression Screening:  :     3 most recent PHQ Screens 6/27/2019   Little interest or pleasure in doing things Not at all   Feeling down, depressed, irritable, or hopeless Not at all   Total Score PHQ 2 0       Fall Risk Assessment:  :     Fall Risk Assessment, last 12 mths 6/27/2019   Able to walk? Yes   Fall in past 12 months? No       Abuse Screening:  :     Abuse Screening Questionnaire 6/27/2019 12/20/2018 7/2/2018 6/20/2018   Do you ever feel afraid of your partner? N N N N   Are you in a relationship with someone who physically or mentally threatens you? N N N N   Is it safe for you to go home? Y Y Y -       Coordination of Care Questionnaire:  :     1. Have you been to the ER, urgent care clinic since your last visit? Hospitalized since your last visit? no    2.  Have you seen or consulted any other health care providers outside of the [address] [place] [address] since your last visit? Include any pap smears or colon screening. No      Patient refused flu shot.

## 2019-12-26 NOTE — PROGRESS NOTES
Alprazolam 0.5 mg currently on backorder. Per verbal order from Dr Martin Peers, change to Alprazolam 1 mg take, 1/2 tab TID PRN, #45 with 0 refills.

## 2019-12-26 NOTE — PROGRESS NOTES
This note will not be viewable in [address] Lizbet Chaves is a 80 y.o. male and presents with Hypertension (follow up) and Anxiety (follow up)  . Subjective:  Mr. Desire Max presents today for follow-up of hypertension, generalized anxiety, insomnia, and chronic back pain. He remains on amlodipine 5 mg daily for hypertension with good results. He takes alprazolam 0.5 mg nightly for anxiety as needed and or insomnia. He has had back surgery previously and continues to have significant back pain but remains active. He has been on gabapentin 400 mg daily with good results. He denies shortness of breath, chest pain, palpitations, PND, orthopnea, or pedal edema. He and his wife are doing well and planning on traveling to [place] this winter. Review of Systems  Constitutional:   Eyes:   negative for visual disturbance and irritation  ENT:   negative for tinnitus,sore throat,nasal congestion,ear pains. hoarseness  Respiratory:  negative for cough, hemoptysis, dyspnea,wheezing  CV:   negative for chest pain, palpitations, lower extremity edema  GI:   negative for nausea, vomiting, diarrhea, abdominal pain,melena  Endo:               negative for polyuria,polydipsia,polyphagia,heat intolerance  Genitourinary: negative for frequency, dysuria and hematuria  Integumentary: negative for rash and pruritus  Hematologic:  negative for easy bruising and gum/nose bleeding  Musculoskel: negative for myalgias, arthralgias, back pain, muscle weakness, joint pain  Neurological:  negative for headaches, dizziness, vertigo, memory problems and gait   Behavl/Psych: negative for feelings of anxiety, depression, mood changes    Past Medical History:   Diagnosis Date    Allergic rhinitis 12/15/2017    Arthritis of knee, left 12/15/2017    Aseptic meningitis     Chronic pain     chronic back pain    Colon polyps     Constipation 12/15/2017    Dysphagia 12/15/2017    Elevated blood pressure reading 12/15/2017    Fatigue 12/15/2017    Headache 12/15/2017    Hematuria 12/15/2017    Herpes zoster dermatitis 12/15/2017    Hyperkalemia 12/15/2017    Hypertension 12/15/2017    Insomnia 12/15/2017    Osteoarthritis 12/15/2017    Otitis externa 12/15/2017    Prostate cancer screening 12/15/2017    Right groin hernia     Sciatica 12/15/2017    Sciatica of left side associated with disorder of lumbosacral spine 12/15/2017    Shingles     Spinal stenosis 12/15/2017     Past Surgical History:   Procedure Laterality Date    COLONOSCOPY N/A 11/29/2018    [facility] ENDOSCOPY    COLONOSCOPY,DIAGNOSTIC  11/11/2014         COLONOSCOPY,DIAGNOSTIC  11/29/2018         HX CATARACT REMOVAL Bilateral     with lens implants    HX COLONOSCOPY      HX HERNIA REPAIR Left     HX HERNIA REPAIR  06/05/2019    HX ORTHOPAEDIC  2008    rods placed in back    HX ORTHOPAEDIC  10/6/15    LEFT L5-S1 MICRODISCECTOMY     HX ORTHOPAEDIC  08/2018    glynn replacement, by Dr. Barbara Vann      steroid injection for back pain    HX TONSILLECTOMY      GA EGD INSERT GUIDE WIRE DILATOR PASSAGE ESOPHAGUS  10/17/2013         GA EGD TRANSORAL BIOPSY SINGLE/MULTIPLE  10/17/2013          Social History     Socioeconomic History    Marital status:      Spouse name: Not on file    Number of children: Not on file    Years of education: Not on file    Highest education level: Not on file   Tobacco Use    Smoking status: Never Smoker    Smokeless tobacco: Never Used   Substance and Sexual Activity    Alcohol use: Never     Frequency: Never    Drug use: Never     Family History   Problem Relation Age of Onset    Lung Disease Mother         lung cancer    Hypertension Mother     Heart Failure Mother     Hypertension Father     Heart Failure Father      Current Outpatient Medications   Medication Sig Dispense Refill    ALPRAZolam (XANAX) 1 mg tablet Take 0.5 Tabs by mouth three (3) times daily as needed for Anxiety. Max Daily Amount: 1.5 mg. 45 Tab 0    gabapentin (NEURONTIN) 400 mg capsule TAKE ONE CAPSULE BY MOUTH 3 TIMES A DAY . MAX DAILY AMOUNT:1,200MG 90 Cap 3    amLODIPine (NORVASC) 5 mg tablet TAKE 1 TABLET BY MOUTH EVERY DAY 90 Tab 3    tiZANidine (ZANAFLEX) 4 mg tablet TAKE 1 TABLET BY MOUTH EVERY DAY AT NIGHT 90 Tab 3    polyethylene glycol (MIRALAX) 17 gram/dose powder Take 17 g by mouth daily. 510 g 0    Wheat Dextrin (BENEFIBER CLEAR) 3 gram/3.5 gram pwpk Take 1 Each by mouth daily.  vit C/E/Zn/coppr/lutein/zeaxan (PRESERVISION AREDS 2 PO) Take 1 Tab by mouth two (2) times a day.  acetaminophen (TYLENOL) 325 mg tablet Take 650 mg by mouth every four (4) hours as needed for Pain.  ibuprofen (MOTRIN) 600 mg tablet Take 1 Tab by mouth three (3) times daily (with meals). 21 Tab 0    naloxone (NARCAN) 4 mg/actuation nasal spray 1 [place] by IntraNASal route as needed (respiratory depression). Give single spray into one nostril. Call 911. Give additional doses every 2 to 3 minutes alternating nostrils until assistance arrives using a new nasal spray with each dose, if patient does not respond or responds and then relapses.  (Patient not taking: Reported on 6/27/2019) 1 Each 0     No Known Allergies    Objective:  Visit Vitals  /78 (BP 1 Location: Left arm, BP Patient Position: Sitting)   Pulse 72   Temp 97.5 °F (36.4 °C) (Oral)   Resp 16   Ht 5' 9\" (1.753 m)   Wt 185 lb (83.9 kg)   SpO2 98%   BMI 27.32 kg/m²     Physical Exam:   General appearance - alert, well appearing, and in no distress  Mental status - alert, oriented to person, place, and time  EYE-TRACY, EOMI, fundi normal, corneas normal, no foreign bodies  ENT-ENT exam normal, no neck nodes or sinus tenderness  Nose - normal and patent, no erythema, discharge or polyps  Mouth - mucous membranes moist, pharynx normal without lesions  Neck - supple, no significant adenopathy   Chest - clear to auscultation, no wheezes, rales or rhonchi, symmetric air entry   Heart - normal rate, regular rhythm, normal S1, S2, no murmurs, rubs, clicks or gallops   Abdomen - soft, nontender, nondistended, no masses or organomegaly  Lymph- no adenopathy palpable  Ext-peripheral pulses normal, no pedal edema, no clubbing or cyanosis  Skin-Warm and dry. no hyperpigmentation, vitiligo, or suspicious lesions  Neuro -alert, oriented, normal speech, no focal findings or movement disorder noted  Musculoskeletal- FROM, no bony abnormalities, no point tenderness    No results found for this visit on 12/26/19. All results for lab orders may not have been returned by the time this encountered was closed. Assessment/Plan:       ICD-10-CM ICD-9-CM    1. Essential hypertension I10 401.9    2. Anxiety F41.9 300.00    3. Spinal stenosis of lumbar region, unspecified whether neurogenic claudication present M48.061 724.02 gabapentin (NEURONTIN) 400 mg capsule       Orders Placed This Encounter    gabapentin (NEURONTIN) 400 mg capsule     Sig: TAKE ONE CAPSULE BY MOUTH 3 TIMES A DAY . MAX DAILY AMOUNT:1,200MG     Dispense:  90 Cap     Refill:  3     Not to exceed 2 additional fills before 01/26/2020. Plan:    Continue current medical regimen as outlined above. I reviewed the patient's labs with him which are excellent at this time. Follow-up in the spring after returning from [place]. I have reviewed with the patient details of the assessment and plan and all questions were answered. Relevent patient education was performed. Verbal and/or written instructions (see AVS) provided. The most recent lab findings were reviewed with the patient. Plan was discussed with patient who verbal expressed understanding. An After Visit Summary was printed and given to the patient.       Donnie Aguilar MD    This is the Subsequent Medicare Annual Wellness Exam, performed 12 months or more after the Initial AWV or the last Subsequent GEORGETTE    I have reviewed the patient's medical history in detail and updated the computerized patient record.      History     Patient Active Problem List   Diagnosis Code    HNP (herniated nucleus pulposus), lumbar M51.26    Constipation K59.00    Hyperkalemia E87.5    Allergic rhinitis J30.9    Herpes zoster dermatitis B02.8, L30.8    Meningitis, viral A87.9    Arthritis of knee, left M17.12    Sciatica of left side associated with disorder of lumbosacral spine M53.87    Spinal stenosis M48.00    Hypertension I10    Sciatica M54.30    Insomnia G47.00    Prostate cancer screening Z12.5    Osteoarthritis M19.90    Acute left-sided low back pain without sciatica M54.5    Right inguinal hernia K40.90     Past Medical History:   Diagnosis Date    Allergic rhinitis 12/15/2017    Arthritis of knee, left 12/15/2017    Aseptic meningitis     Chronic pain     chronic back pain    Colon polyps     Constipation 12/15/2017    Dysphagia 12/15/2017    Elevated blood pressure reading 12/15/2017    Fatigue 12/15/2017    Headache 12/15/2017    Hematuria 12/15/2017    Herpes zoster dermatitis 12/15/2017    Hyperkalemia 12/15/2017    Hypertension 12/15/2017    Insomnia 12/15/2017    Osteoarthritis 12/15/2017    Otitis externa 12/15/2017    Prostate cancer screening 12/15/2017    Right groin hernia     Sciatica 12/15/2017    Sciatica of left side associated with disorder of lumbosacral spine 12/15/2017    Shingles     Spinal stenosis 12/15/2017      Past Surgical History:   Procedure Laterality Date    COLONOSCOPY N/A 11/29/2018    [facility]  11/11/2014         COLONOSCOPY,DIAGNOSTIC  11/29/2018         HX CATARACT REMOVAL Bilateral     with lens implants    HX COLONOSCOPY      HX HERNIA REPAIR Left     HX HERNIA REPAIR  06/05/2019    HX ORTHOPAEDIC  2008    rods placed in back    HX ORTHOPAEDIC  10/6/15    LEFT L5-S1 MICRODISCECTOMY     HX ORTHOPAEDIC  08/2018    glynn replacement, by Dr. Shasha Wallace      steroid injection for back pain    HX TONSILLECTOMY      CA EGD INSERT GUIDE WIRE DILATOR PASSAGE ESOPHAGUS  10/17/2013         CA EGD TRANSORAL BIOPSY SINGLE/MULTIPLE  10/17/2013          Current Outpatient Medications   Medication Sig Dispense Refill    ALPRAZolam (XANAX) 1 mg tablet Take 0.5 Tabs by mouth three (3) times daily as needed for Anxiety. Max Daily Amount: 1.5 mg. 45 Tab 0    gabapentin (NEURONTIN) 400 mg capsule TAKE ONE CAPSULE BY MOUTH 3 TIMES A DAY . MAX DAILY AMOUNT:1,200MG 90 Cap 3    amLODIPine (NORVASC) 5 mg tablet TAKE 1 TABLET BY MOUTH EVERY DAY 90 Tab 3    tiZANidine (ZANAFLEX) 4 mg tablet TAKE 1 TABLET BY MOUTH EVERY DAY AT NIGHT 90 Tab 3    polyethylene glycol (MIRALAX) 17 gram/dose powder Take 17 g by mouth daily. 510 g 0    Wheat Dextrin (BENEFIBER CLEAR) 3 gram/3.5 gram pwpk Take 1 Each by mouth daily.  vit C/E/Zn/coppr/lutein/zeaxan (PRESERVISION AREDS 2 PO) Take 1 Tab by mouth two (2) times a day.  acetaminophen (TYLENOL) 325 mg tablet Take 650 mg by mouth every four (4) hours as needed for Pain.  ibuprofen (MOTRIN) 600 mg tablet Take 1 Tab by mouth three (3) times daily (with meals). 21 Tab 0    naloxone (NARCAN) 4 mg/actuation nasal spray 1 [place] by IntraNASal route as needed (respiratory depression). Give single spray into one nostril. Call 911. Give additional doses every 2 to 3 minutes alternating nostrils until assistance arrives using a new nasal spray with each dose, if patient does not respond or responds and then relapses.  (Patient not taking: Reported on 6/27/2019) 1 Each 0     No Known Allergies    Family History   Problem Relation Age of Onset    Lung Disease Mother         lung cancer    Hypertension Mother     Heart Failure Mother     Hypertension Father     Heart Failure Father      Social History     Tobacco Use    Smoking status: Never Smoker    Smokeless tobacco: Never Used   Substance Use Topics    Alcohol use: Never     Frequency: Never       Depression Risk Factor Screening:     3 most recent PHQ Screens 6/27/2019   Little interest or pleasure in doing things Not at all   Feeling down, depressed, irritable, or hopeless Not at all   Total Score PHQ 2 0       Alcohol Risk Factor Screening (MALE > 65): Do you average more 1 drink per night or more than 7 drinks a week: No    In the past three months have you have had more than 4 drinks containing alcohol on one occasion: No      Functional Ability and Level of Safety:   Hearing: The patient wears hearing aids. Activities of Daily Living: The home contains: no safety equipment. Patient does total self care    Ambulation: with no difficulty    Fall Risk:  Fall Risk Assessment, last 12 mths 6/27/2019   Able to walk? Yes   Fall in past 12 months? No       Abuse Screen:  Patient is not abused    Cognitive Screening   Has your family/caregiver stated any concerns about your memory: no  Cognitive Screening: Normal - Verbal Fluency Test    Patient Care Team   Patient Care Team:  Aliza Cole MD as PCP - General (Internal Medicine)  Aliza Cole MD as PCP - [facility] Provider  Prateek Fagan MD as Surgeon (General Surgery)  Sarah Romero MD (Orthopedic Surgery)    Assessment/Plan   Education and counseling provided:  Are appropriate based on today's review and evaluation    Diagnoses and all orders for this visit:    1. Medicare annual wellness visit, subsequent    2. Anxiety    3. Spinal stenosis of lumbar region, unspecified whether neurogenic claudication present  -     gabapentin (NEURONTIN) 400 mg capsule; TAKE ONE CAPSULE BY MOUTH 3 TIMES A DAY . MAX DAILY AMOUNT:1,200MG    4. Essential hypertension    5.  Screening for alcoholism  -     [place] ANNUAL ALCOHOL SCREEN 15 MIN    6. Screening for depression  -     [place] Maintenance Due Topic Date Due    DTaP/Tdap/Td series (1 - Tdap) 05/09/1946    Shingrix Vaccine Age 50> (1 of 2) 05/09/1985    GLAUCOMA SCREENING Q2Y  05/09/2000    Pneumococcal 65+ years (1 of 1 - PPSV23) 05/09/2000    MEDICARE YEARLY EXAM  12/21/2019

## 2020-01-22 DIAGNOSIS — M48.061 SPINAL STENOSIS OF LUMBAR REGION, UNSPECIFIED WHETHER NEUROGENIC CLAUDICATION PRESENT: ICD-10-CM

## 2020-01-22 RX ORDER — GABAPENTIN 400 MG/1
CAPSULE ORAL
Qty: 90 CAP | Refills: 3 | Status: SHIPPED | OUTPATIENT
Start: 2020-01-22 | End: 2020-03-24

## 2020-01-22 NOTE — TELEPHONE ENCOUNTER
Requested Prescriptions     Pending Prescriptions Disp Refills    gabapentin (NEURONTIN) 400 mg capsule 90 Cap 3     Sig: TAKE ONE CAPSULE BY MOUTH 3 TIMES A DAY .  MAX DAILY AMOUNT:1,200MG       Last Refill: 12/26/19  Next Appointment:no fu last seen 12/26/19

## 2020-03-24 DIAGNOSIS — M48.061 SPINAL STENOSIS OF LUMBAR REGION, UNSPECIFIED WHETHER NEUROGENIC CLAUDICATION PRESENT: ICD-10-CM

## 2020-03-24 DIAGNOSIS — F41.9 ANXIETY: ICD-10-CM

## 2020-03-24 RX ORDER — ALPRAZOLAM 0.5 MG/1
TABLET ORAL
Qty: 90 TAB | Refills: 0 | Status: SHIPPED | OUTPATIENT
Start: 2020-03-24 | End: 2020-03-27 | Stop reason: SDUPTHER

## 2020-03-24 RX ORDER — GABAPENTIN 400 MG/1
CAPSULE ORAL
Qty: 90 CAP | Refills: 1 | Status: SHIPPED | OUTPATIENT
Start: 2020-03-24 | End: 2020-03-27 | Stop reason: SDUPTHER

## 2020-03-24 NOTE — TELEPHONE ENCOUNTER
Requested Prescriptions     Pending Prescriptions Disp Refills    ALPRAZolam (XANAX) 0.5 mg tablet 90 Tab 0     Sig: TAKE 1 TABLET BY MOUTH THREE TIMES A DAY AS NEEDED ANXIETY       Last Refill: 12/23/20  Next Appointment: jessica fu last seen 12/26/19

## 2020-03-27 DIAGNOSIS — F41.9 ANXIETY: ICD-10-CM

## 2020-03-27 DIAGNOSIS — M48.061 SPINAL STENOSIS OF LUMBAR REGION, UNSPECIFIED WHETHER NEUROGENIC CLAUDICATION PRESENT: ICD-10-CM

## 2020-03-27 RX ORDER — GABAPENTIN 400 MG/1
CAPSULE ORAL
Qty: 90 CAP | Refills: 1 | Status: SHIPPED | OUTPATIENT
Start: 2020-03-27 | End: 2020-07-23

## 2020-03-27 RX ORDER — ALPRAZOLAM 0.5 MG/1
TABLET ORAL
Qty: 90 TAB | Refills: 0 | Status: SHIPPED | OUTPATIENT
Start: 2020-03-27 | End: 2020-06-24

## 2020-03-27 NOTE — TELEPHONE ENCOUNTER
Requested Prescriptions     Pending Prescriptions Disp Refills    ALPRAZolam (XANAX) 0.5 mg tablet 90 Tab 0     Sig: TAKE 1 TABLET BY MOUTH THREE TIMES A DAY AS NEEDED ANXIETY    gabapentin (NEURONTIN) 400 mg capsule 90 Cap 1     Sig: TAKE 1 CAPSULE BY MOUTH THREE TIMES A DAY       Last Refill: ? Pharmacy did not receive on 3/24/20  Next Appointment:no fu last seen 12/26/19

## 2020-03-27 NOTE — TELEPHONE ENCOUNTER
PCP: Manuela Valverde MD    Last appt: 12/26/2019  No future appointments.     Requested Prescriptions     Pending Prescriptions Disp Refills    ALPRAZolam (XANAX) 0.5 mg tablet 90 Tab 0     Sig: TAKE 1 TABLET BY MOUTH THREE TIMES A DAY AS NEEDED ANXIETY    gabapentin (NEURONTIN) 400 mg capsule 90 Cap 1     Sig: TAKE 1 CAPSULE BY MOUTH THREE TIMES A DAY       Prior labs and Blood pressures:  BP Readings from Last 3 Encounters:   12/26/19 136/78   10/30/19 134/78   06/27/19 130/70     Lab Results   Component Value Date/Time    Sodium 140 12/05/2019 08:20 AM    Potassium 4.8 12/05/2019 08:20 AM    Chloride 103 12/05/2019 08:20 AM    CO2 31.0 12/05/2019 08:20 AM    Anion gap 6 12/05/2019 08:20 AM    Glucose 96 12/05/2019 08:20 AM    BUN 15.0 12/05/2019 08:20 AM    Creatinine 0.7 (L) 12/05/2019 08:20 AM    BUN/Creatinine ratio 21 12/05/2019 08:20 AM    GFR est AA >60 12/05/2019 08:20 AM    GFR est non-AA >60 12/05/2019 08:20 AM    Calcium 9.7 12/05/2019 08:20 AM     Lab Results   Component Value Date/Time    Hemoglobin A1c 5.4 08/03/2018 03:31 PM     Lab Results   Component Value Date/Time    Cholesterol, total 172 12/05/2019 08:20 AM    Cholesterol (POC) 176.0 12/21/2017 10:53 AM    HDL Cholesterol 46 12/05/2019 08:20 AM    HDL Cholesterol (POC) 52.0 12/21/2017 10:53 AM    LDL Cholesterol (POC) 101.2 12/21/2017 10:53 AM    LDL, calculated 110 12/05/2019 08:20 AM    VLDL, calculated 14 12/14/2018 09:10 AM    VLDL 16 12/05/2019 08:20 AM    Triglyceride 80 12/05/2019 08:20 AM    Triglycerides (POC) 114.0 12/21/2017 10:53 AM    CHOL/HDL Ratio 4 12/05/2019 08:20 AM     Lab Results   Component Value Date/Time    Vitamin D 25-Hydroxy 28.9 (L) 08/03/2018 03:31 PM       No results found for: TSH, TSH2, TSH3, TSHP, TSHEXT

## 2020-04-28 ENCOUNTER — OFFICE VISIT (OUTPATIENT)
Dept: INTERNAL MEDICINE CLINIC | Age: 85
End: 2020-04-28

## 2020-04-28 VITALS
TEMPERATURE: 97.5 F | BODY MASS INDEX: 27.81 KG/M2 | WEIGHT: 187.8 LBS | OXYGEN SATURATION: 97 % | SYSTOLIC BLOOD PRESSURE: 140 MMHG | HEART RATE: 72 BPM | HEIGHT: 69 IN | DIASTOLIC BLOOD PRESSURE: 78 MMHG | RESPIRATION RATE: 16 BRPM

## 2020-04-28 DIAGNOSIS — S30.96XA: Primary | ICD-10-CM

## 2020-04-28 NOTE — PROGRESS NOTES
This note will not be viewable in [address] Cristiano Granados is a 80 y.o. male and presents with Groin Pain  . Subjective:  Mr. Ramona Anton presents today with complaint of \"groin pain\". He states that he and his wife just returned from [place] approximately 3 weeks ago. He has had no fever, congestion, cough, or shortness of breath. He notes that he has been riding a bike frequently while in [place] and sitting at times where his shorts would run up into his groin area. He denies any pain or discomfort but did note some bruising of his foreskin and tip of his penis. He has had no dysuria or decreased force of urinary stream.  He denies any hematuria. Stat remotely he had some trouble with his foreskin and had surgery years ago with some complications. Review of Systems  Constitutional:   Eyes:   negative for visual disturbance and irritation  ENT:   negative for tinnitus,sore throat,nasal congestion,ear pains. hoarseness  Respiratory:  negative for cough, hemoptysis, dyspnea,wheezing  CV:   negative for chest pain, palpitations, lower extremity edema  GI:   negative for nausea, vomiting, diarrhea, abdominal pain,melena  Endo:               negative for polyuria,polydipsia,polyphagia,heat intolerance  Genitourinary: negative for frequency, dysuria and hematuria  Integumentary: negative for rash and pruritus  Hematologic:  negative for easy bruising and gum/nose bleeding  Musculoskel: negative for myalgias, arthralgias, back pain, muscle weakness, joint pain  Neurological:  negative for headaches, dizziness, vertigo, memory problems and gait   Behavl/Psych: negative for feelings of anxiety, depression, mood changes    Past Medical History:   Diagnosis Date    Allergic rhinitis 12/15/2017    Arthritis of knee, left 12/15/2017    Aseptic meningitis     Chronic pain     chronic back pain    Colon polyps     Constipation 12/15/2017    Dysphagia 12/15/2017    Elevated blood pressure reading 12/15/2017    Fatigue 12/15/2017    Headache 12/15/2017    Hematuria 12/15/2017    Herpes zoster dermatitis 12/15/2017    Hyperkalemia 12/15/2017    Hypertension 12/15/2017    Insomnia 12/15/2017    Osteoarthritis 12/15/2017    Otitis externa 12/15/2017    Prostate cancer screening 12/15/2017    Right groin hernia     Sciatica 12/15/2017    Sciatica of left side associated with disorder of lumbosacral spine 12/15/2017    Shingles     Spinal stenosis 12/15/2017     Past Surgical History:   Procedure Laterality Date    COLONOSCOPY N/A 2018    [facility]COPY    COLONOSCOPY,DIAGNOSTIC  2014         COLONOSCOPY,DIAGNOSTIC  2018         HX CATARACT REMOVAL Bilateral     with lens implants    HX COLONOSCOPY      HX HERNIA REPAIR Left     HX HERNIA REPAIR  2019    HX ORTHOPAEDIC  2008    rods placed in back    HX ORTHOPAEDIC  10/6/15    LEFT L5-S1 MICRODISCECTOMY     HX ORTHOPAEDIC  2018    glynn replacement, by Dr. Penny Piper      steroid injection for back pain    HX TONSILLECTOMY      [place] EGD INSERT GUIDE WIRE DILATOR PASSAGE ESOPHAGUS  10/17/2013         [place] EGD TRANSORAL BIOPSY SINGLE/MULTIPLE  10/17/2013          Social History     Socioeconomic History    Marital status:      Spouse name: Not on file    Number of children: Not on file    Years of education: Not on file    Highest education level: Not on file   Tobacco Use    Smoking status: Never Smoker    Smokeless tobacco: Never Used   Substance and Sexual Activity    Alcohol use: Never     Frequency: Never    Drug use: Never     Family History   Problem Relation Age of Onset    Lung Disease Mother         lung cancer    Hypertension Mother     Heart Failure Mother     Hypertension Father     Heart Failure Father      Current Outpatient Medications   Medication Sig Dispense Refill    ALPRAZolam (XANAX) 0.5 mg tablet TAKE 1 TABLET BY MOUTH THREE TIMES A DAY AS NEEDED ANXIETY 90 Tab 0    gabapentin (NEURONTIN) 400 mg capsule TAKE 1 CAPSULE BY MOUTH THREE TIMES A DAY 90 Cap 1    ALPRAZolam (XANAX) 1 mg tablet Take 0.5 Tabs by mouth three (3) times daily as needed for Anxiety. Max Daily Amount: 1.5 mg. 45 Tab 0    amLODIPine (NORVASC) 5 mg tablet TAKE 1 TABLET BY MOUTH EVERY DAY 90 Tab 3    tiZANidine (ZANAFLEX) 4 mg tablet TAKE 1 TABLET BY MOUTH EVERY DAY AT NIGHT 90 Tab 3    polyethylene glycol (MIRALAX) 17 gram/dose powder Take 17 g by mouth daily. (Patient taking differently: Take 17 g by mouth as needed.) 510 g 0    Wheat Dextrin (BENEFIBER CLEAR) 3 gram/3.5 gram pwpk Take 1 Each by mouth daily.  vit C/E/Zn/coppr/lutein/zeaxan (PRESERVISION AREDS 2 PO) Take 1 Tab by mouth two (2) times a day.  acetaminophen (TYLENOL) 325 mg tablet Take 650 mg by mouth every four (4) hours as needed for Pain.  ibuprofen (MOTRIN) 600 mg tablet Take 1 Tab by mouth three (3) times daily (with meals). 21 Tab 0    naloxone (NARCAN) 4 mg/actuation nasal spray 1 [place] by IntraNASal route as needed (respiratory depression). Give single spray into one nostril. Call 911. Give additional doses every 2 to 3 minutes alternating nostrils until assistance arrives using a new nasal spray with each dose, if patient does not respond or responds and then relapses.  (Patient not taking: Reported on 6/27/2019) 1 Each 0     No Known Allergies    Objective:  Visit Vitals  /78 (BP 1 Location: Right arm, BP Patient Position: Sitting)   Pulse 72   Temp 97.5 °F (36.4 °C) (Oral)   Resp 16   Ht 5' 9\" (1.753 m)   Wt 187 lb 12.8 oz (85.2 kg)   SpO2 97%   BMI 27.73 kg/m²     Physical Exam:   General appearance - alert, well appearing, and in no distress  Mental status - alert, oriented to person, place, and time  EYE-TRAYC, EOMI, fundi normal, corneas normal, no foreign bodies  ENT-ENT exam normal, no neck nodes or sinus tenderness  Chest - clear to auscultation, no wheezes, rales or rhonchi, symmetric air entry   Heart - normal rate, regular rhythm, normal S1, S2, no murmurs, rubs, clicks or gallops   Abdomen - soft, nontender, nondistended, no masses or organomegaly  Lymph- no adenopathy palpable  Ext-peripheral pulses normal, no pedal edema, no clubbing or cyanosis  Skin-Warm and dry. no hyperpigmentation, vitiligo, or suspicious lesions  Neuro -alert, oriented, normal speech, no focal findings or movement disorder noted  Musculoskeletal- FROM, no bony abnormalities, no point tenderness  -foreskin without any significant erythema or swelling and retracted without difficulty, the glans penis has some bruising and loss of color with blanching, the meatus is without drainage    No results found for this visit on 04/28/20. All results for lab orders may not have been returned by the time this encountered was closed. Assessment/Plan:       ICD-10-CM ICD-9-CM    1. Superficial injury of male external genitalia, initial encounter S30.96XA 911.8 REFERRAL TO UROLOGY       Orders Placed This Encounter    REFERRAL TO UROLOGY     Referral Priority:   Urgent     Referral Type:   Consultation     Referral Reason:   Specialty Services Required     Referred to Provider:   Kala Soto MD     Requested Specialty:   Urology     Number of Visits Requested:   1       Plan:    Patient's exam shows changes suggesting possible ischemia question from localized trauma. Will refer to urology for further definitive evaluation. I have reviewed with the patient details of the assessment and plan and all questions were answered. Relevent patient education was performed. Verbal and/or written instructions (see AVS) provided. The most recent lab findings were reviewed with the patient. Plan was discussed with patient who verbal expressed understanding. An After Visit Summary was printed and given to the patient.       Sebastien Mendoza MD

## 2020-04-28 NOTE — PROGRESS NOTES
Reviewed record in preparation for visit and have obtained necessary documentation. Identified pt with two pt identifiers(name and ). Chief Complaint   Patient presents with    Groin Pain        Coordination of Care Questionnaire:  :     1) Have you been to an emergency room, urgent care clinic since your last visit? No     Hospitalized since your last visit? No               2) Have you seen or consulted any other health care providers outside of [address] [place] [address] since your last visit?   No

## 2020-06-19 ENCOUNTER — OFFICE VISIT (OUTPATIENT)
Dept: INTERNAL MEDICINE CLINIC | Age: 85
End: 2020-06-19

## 2020-06-19 VITALS
RESPIRATION RATE: 18 BRPM | DIASTOLIC BLOOD PRESSURE: 80 MMHG | HEART RATE: 65 BPM | BODY MASS INDEX: 27.11 KG/M2 | HEIGHT: 69 IN | TEMPERATURE: 98 F | WEIGHT: 183 LBS | SYSTOLIC BLOOD PRESSURE: 130 MMHG | OXYGEN SATURATION: 96 %

## 2020-06-19 DIAGNOSIS — M48.061 SPINAL STENOSIS OF LUMBAR REGION, UNSPECIFIED WHETHER NEUROGENIC CLAUDICATION PRESENT: ICD-10-CM

## 2020-06-19 DIAGNOSIS — Z13.6 SCREENING FOR ISCHEMIC HEART DISEASE: ICD-10-CM

## 2020-06-19 DIAGNOSIS — I10 ESSENTIAL HYPERTENSION: Primary | ICD-10-CM

## 2020-06-19 PROBLEM — Z98.890 S/P LUMBAR LAMINECTOMY: Status: ACTIVE | Noted: 2017-10-19

## 2020-06-19 PROBLEM — M47.26 OSTEOARTHRITIS OF SPINE WITH RADICULOPATHY, LUMBAR REGION: Status: ACTIVE | Noted: 2017-10-19

## 2020-06-19 NOTE — PROGRESS NOTES
This note will not be viewable in [address] Tawana Sheehan is a 80 y.o. male and presents with Hypertension and Pain (Chronic)  . Subjective:    Mr. Jennifer Linton presents today for follow-up of hypertension, and pain related to postlaminectomy syndrome, spondylosis and spinal stenosis. He remains on gabapentin with good results. He is recently started using an inversion therapy table with good results. He remains on amlodipine 5 mg daily for hypertension with good results. He notes increased pain in his lower back when standing for periods of time. Review of Systems  Constitutional:   Eyes:   negative for visual disturbance and irritation  ENT:   negative for tinnitus,sore throat,nasal congestion,ear pains. hoarseness  Respiratory:  negative for cough, hemoptysis, dyspnea,wheezing  CV:   negative for chest pain, palpitations, lower extremity edema  GI:   negative for nausea, vomiting, diarrhea, abdominal pain,melena  Endo:               negative for polyuria,polydipsia,polyphagia,heat intolerance  Genitourinary: negative for frequency, dysuria and hematuria  Integumentary: negative for rash and pruritus  Hematologic:  negative for easy bruising and gum/nose bleeding  Musculoskel: negative for myalgias, arthralgias,  muscle weakness, joint pain  Neurological:  negative for headaches, dizziness, vertigo, memory problems and gait   Behavl/Psych: negative for feelings of anxiety, depression, mood changes    Past Medical History:   Diagnosis Date    Allergic rhinitis 12/15/2017    Arthritis of knee, left 12/15/2017    Aseptic meningitis     Chronic pain     chronic back pain    Colon polyps     Constipation 12/15/2017    Dysphagia 12/15/2017    Elevated blood pressure reading 12/15/2017    Fatigue 12/15/2017    Headache 12/15/2017    Hematuria 12/15/2017    Herpes zoster dermatitis 12/15/2017    Hyperkalemia 12/15/2017    Hypertension 12/15/2017    Insomnia 12/15/2017    Osteoarthritis 12/15/2017    Otitis externa 12/15/2017    Prostate cancer screening 12/15/2017    Right groin hernia     Sciatica 12/15/2017    Sciatica of left side associated with disorder of lumbosacral spine 12/15/2017    Shingles     Spinal stenosis 12/15/2017     Past Surgical History:   Procedure Laterality Date    COLONOSCOPY N/A 11/29/2018    [facility]COPY    COLONOSCOPY,DIAGNOSTIC  11/11/2014         COLONOSCOPY,DIAGNOSTIC  11/29/2018         HX CATARACT REMOVAL Bilateral     with lens implants    HX COLONOSCOPY      HX HERNIA REPAIR Left     HX HERNIA REPAIR  06/05/2019    HX ORTHOPAEDIC  2008    rods placed in back    HX ORTHOPAEDIC  10/6/15    LEFT L5-S1 MICRODISCECTOMY     HX ORTHOPAEDIC  08/2018    glynn replacement, by Dr. Leonidas Lopez      steroid injection for back pain    HX TONSILLECTOMY      [place] EGD INSERT GUIDE WIRE DILATOR PASSAGE ESOPHAGUS  10/17/2013         [place] EGD TRANSORAL BIOPSY SINGLE/MULTIPLE  10/17/2013          Social History     Socioeconomic History    Marital status:      Spouse name: Not on file    Number of children: Not on file    Years of education: Not on file    Highest education level: Not on file   Tobacco Use    Smoking status: Never Smoker    Smokeless tobacco: Never Used   Substance and Sexual Activity    Alcohol use: Never     Frequency: Never    Drug use: Never     Family History   Problem Relation Age of Onset    Lung Disease Mother         lung cancer    Hypertension Mother     Heart Failure Mother     Hypertension Father     Heart Failure Father      Current Outpatient Medications   Medication Sig Dispense Refill    ALPRAZolam (XANAX) 0.5 mg tablet TAKE 1 TABLET BY MOUTH THREE TIMES A DAY AS NEEDED ANXIETY 90 Tab 0    amLODIPine (NORVASC) 5 mg tablet TAKE 1 TABLET BY MOUTH EVERY DAY 90 Tab 3    tiZANidine (ZANAFLEX) 4 mg tablet TAKE 1 TABLET BY MOUTH EVERY DAY AT NIGHT 90 Tab 3    polyethylene glycol (MIRALAX) 17 gram/dose powder Take 17 g by mouth daily. (Patient taking differently: Take 17 g by mouth as needed.) 510 g 0    Wheat Dextrin (BENEFIBER CLEAR) 3 gram/3.5 gram pwpk Take 1 Each by mouth daily.  vit C/E/Zn/coppr/lutein/zeaxan (PRESERVISION AREDS 2 PO) Take 1 Tab by mouth two (2) times a day.  acetaminophen (TYLENOL) 325 mg tablet Take 650 mg by mouth every four (4) hours as needed for Pain.  gabapentin (NEURONTIN) 400 mg capsule TAKE 1 CAPSULE BY MOUTH THREE TIMES A DAY (Patient taking differently: TAKE 1 CAPSULE BY MOUTH THREE TIMES A DAY patient taking 300 mg) 90 Cap 1    ALPRAZolam (XANAX) 1 mg tablet Take 0.5 Tabs by mouth three (3) times daily as needed for Anxiety. Max Daily Amount: 1.5 mg. 45 Tab 0    ibuprofen (MOTRIN) 600 mg tablet Take 1 Tab by mouth three (3) times daily (with meals). 21 Tab 0    naloxone (NARCAN) 4 mg/actuation nasal spray 1 [place] by IntraNASal route as needed (respiratory depression). Give single spray into one nostril. Call 911. Give additional doses every 2 to 3 minutes alternating nostrils until assistance arrives using a new nasal spray with each dose, if patient does not respond or responds and then relapses.  (Patient not taking: Reported on 6/27/2019) 1 Each 0     No Known Allergies    Objective:  Visit Vitals  /80 (BP 1 Location: Left arm, BP Patient Position: Sitting)   Pulse 65   Temp 98 °F (36.7 °C) (Oral)   Resp 18   Ht 5' 9\" (1.753 m)   Wt 183 lb (83 kg)   SpO2 96%   BMI 27.02 kg/m²     Physical Exam:   General appearance - alert, well appearing, and in no distress  Mental status - alert, oriented to person, place, and time  EYE-TRACY, EOMI, fundi normal, corneas normal, no foreign bodies  ENT-ENT exam normal, no neck nodes or sinus tenderness  Nose - normal and patent, no erythema, discharge or polyps  Mouth - mucous membranes moist, pharynx normal without lesions  Neck - supple, no significant adenopathy   Chest - clear to auscultation, no wheezes, rales or rhonchi, symmetric air entry   Heart - normal rate, regular rhythm, normal S1, S2, no murmurs, rubs, clicks or gallops   Abdomen - soft, nontender, nondistended, no masses or organomegaly  Lymph- no adenopathy palpable  Ext-peripheral pulses normal, no pedal edema, no clubbing or cyanosis  Skin-Warm and dry. no hyperpigmentation, vitiligo, or suspicious lesions  Neuro -alert, oriented, normal speech, no focal findings or movement disorder noted  Musculoskeletal- FROM, no bony abnormalities, no point tenderness    No results found for this visit on 06/19/20. All results for lab orders may not have been returned by the time this encountered was closed. Assessment/Plan:       ICD-10-CM ICD-9-CM    1. Essential hypertension I10 401.9    2. Spinal stenosis of lumbar region, unspecified whether neurogenic claudication present M48.061 724.02        No orders of the defined types were placed in this encounter. Plan:    Continue current medical regimen as outlined above. Reviewed previous labs done approximately 6 months prior which are excellent and stable. In 6 months unless otherwise indicated. I have reviewed with the patient details of the assessment and plan and all questions were answered. Relevent patient education was performed. Verbal and/or written instructions (see AVS) provided. The most recent lab findings were reviewed with the patient. Plan was discussed with patient who verbal expressed understanding. An After Visit Summary was printed and given to the patient.       Fernando Sosa MD

## 2020-06-19 NOTE — PROGRESS NOTES
Reviewed record in preparation for visit and have obtained necessary documentation. Identified pt with two pt identifiers(name and ). Chief Complaint   Patient presents with    Hypertension    Pain (Chronic)        Coordination of Care Questionnaire:  :     1) Have you been to an emergency room, urgent care clinic since your last visit? No     Hospitalized since your last visit? No               2) Have you seen or consulted any other health care providers outside of [address] [place] [address] since your last visit?   Yes Dr Burrows Cage

## 2020-06-24 DIAGNOSIS — F41.9 ANXIETY: ICD-10-CM

## 2020-06-24 RX ORDER — ALPRAZOLAM 0.5 MG/1
TABLET ORAL
Qty: 90 TAB | Refills: 0 | Status: SHIPPED | OUTPATIENT
Start: 2020-06-24 | End: 2020-09-23

## 2020-07-23 DIAGNOSIS — M48.061 SPINAL STENOSIS OF LUMBAR REGION, UNSPECIFIED WHETHER NEUROGENIC CLAUDICATION PRESENT: ICD-10-CM

## 2020-07-23 RX ORDER — GABAPENTIN 400 MG/1
CAPSULE ORAL
Qty: 90 CAP | Refills: 3 | Status: SHIPPED | OUTPATIENT
Start: 2020-07-23 | End: 2020-11-25

## 2020-09-22 DIAGNOSIS — F41.9 ANXIETY: ICD-10-CM

## 2020-09-23 RX ORDER — ALPRAZOLAM 0.5 MG/1
TABLET ORAL
Qty: 90 TAB | Refills: 0 | Status: SHIPPED | OUTPATIENT
Start: 2020-09-23 | End: 2020-11-25

## 2020-09-28 ENCOUNTER — VIRTUAL VISIT (OUTPATIENT)
Dept: INTERNAL MEDICINE CLINIC | Age: 85
End: 2020-09-28
Payer: MEDICARE

## 2020-09-28 DIAGNOSIS — J02.0 PHARYNGITIS DUE TO STREPTOCOCCUS SPECIES: Primary | ICD-10-CM

## 2020-09-28 PROCEDURE — 99442 PR PHYS/QHP TELEPHONE EVALUATION 11-20 MIN: CPT | Performed by: INTERNAL MEDICINE

## 2020-09-28 RX ORDER — CEFUROXIME AXETIL 500 MG/1
500 TABLET ORAL 2 TIMES DAILY
Qty: 20 TAB | Refills: 0 | Status: SHIPPED | OUTPATIENT
Start: 2020-09-28 | End: 2020-10-08

## 2020-09-28 NOTE — PROGRESS NOTES
This note will not be viewable in [address] Tyrone Fernando is a 80 y.o. male and presents with Cough (non productive); Nasal Congestion; and Sore Throat  . Subjective:    Mr. Shaneka Guerrero presents today via a virtual audio visit given the current COVID-19 pandemic with complaint of sore throat. His symptoms began over the weekend and have persisted. He denies fever. He does have some nasal congestion and a nonproductive cough. He has taken some over-the-counter cold medicine with little relief. His wife notes that he does not complain of any shortness of breath. He has had no fever or chills. His wife notes that she looked in his throat and saw white spots. Review of Systems  Constitutional:   Eyes:   negative for visual disturbance and irritation  ENT:   negative for tinnitus,ear pains. hoarseness  Respiratory:  negative for  hemoptysis, dyspnea,wheezing  CV:   negative for chest pain, palpitations, lower extremity edema  GI:   negative for nausea, vomiting, diarrhea, abdominal pain,melena  Endo:               negative for polyuria,polydipsia,polyphagia,heat intolerance  Genitourinary: negative for frequency, dysuria and hematuria  Integumentary: negative for rash and pruritus  Hematologic:  negative for easy bruising and gum/nose bleeding  Musculoskel: negative for myalgias, arthralgias, back pain, muscle weakness, joint pain  Neurological:  negative for headaches, dizziness, vertigo, memory problems and gait   Behavl/Psych: negative for feelings of anxiety, depression, mood changes    Past Medical History:   Diagnosis Date    Allergic rhinitis 12/15/2017    Arthritis of knee, left 12/15/2017    Aseptic meningitis     Chronic pain     chronic back pain    Colon polyps     Constipation 12/15/2017    Dysphagia 12/15/2017    Elevated blood pressure reading 12/15/2017    Fatigue 12/15/2017    Headache 12/15/2017    Hematuria 12/15/2017    Herpes zoster dermatitis 12/15/2017    Hyperkalemia 12/15/2017    Hypertension 12/15/2017    Insomnia 12/15/2017    Osteoarthritis 12/15/2017    Otitis externa 12/15/2017    Prostate cancer screening 12/15/2017    Right groin hernia     Sciatica 12/15/2017    Sciatica of left side associated with disorder of lumbosacral spine 12/15/2017    Shingles     Spinal stenosis 12/15/2017     Past Surgical History:   Procedure Laterality Date    COLONOSCOPY N/A 11/29/2018    [facility]OSCOPY    COLONOSCOPY,DIAGNOSTIC  11/11/2014         COLONOSCOPY,DIAGNOSTIC  11/29/2018         HX CATARACT REMOVAL Bilateral     with lens implants    HX COLONOSCOPY      HX HERNIA REPAIR Left     HX HERNIA REPAIR  06/05/2019    HX ORTHOPAEDIC  2008    rods placed in back    HX ORTHOPAEDIC  10/6/15    LEFT L5-S1 MICRODISCECTOMY     HX ORTHOPAEDIC  08/2018    glynn replacement, by Dr. Latasha Hitchcock      steroid injection for back pain    HX TONSILLECTOMY      [place] EGD INSERT GUIDE WIRE DILATOR PASSAGE ESOPHAGUS  10/17/2013         [place] EGD TRANSORAL BIOPSY SINGLE/MULTIPLE  10/17/2013          Social History     Socioeconomic History    Marital status:      Spouse name: Not on file    Number of children: Not on file    Years of education: Not on file    Highest education level: Not on file   Tobacco Use    Smoking status: Never Smoker    Smokeless tobacco: Never Used   Substance and Sexual Activity    Alcohol use: Never     Frequency: Never    Drug use: Never     Family History   Problem Relation Age of Onset    Lung Disease Mother         lung cancer    Hypertension Mother     Heart Failure Mother     Hypertension Father     Heart Failure Father      Current Outpatient Medications   Medication Sig Dispense Refill    pseudoephed/acetaminophen/cpm (DARLENE-SELTZER PLUS COLD PO) Take  by mouth.  cefUROXime (CEFTIN) 500 mg tablet Take 1 Tab by mouth two (2) times a day for 10 days.  20 Tab 0    ALPRAZolam (XANAX) 0.5 mg tablet TAKE 1 TABLET BY MOUTH THREE TIMES A DAY AS NEEDED FOR ANXIETY 90 Tab 0    gabapentin (NEURONTIN) 400 mg capsule TAKE 1 CAPSULE BY MOUTH 3 TIMES A DAY 90 Cap 3    amLODIPine (NORVASC) 5 mg tablet TAKE 1 TABLET BY MOUTH EVERY DAY 90 Tab 3    tiZANidine (ZANAFLEX) 4 mg tablet TAKE 1 TABLET BY MOUTH EVERY DAY AT NIGHT 90 Tab 3    ibuprofen (MOTRIN) 600 mg tablet Take 1 Tab by mouth three (3) times daily (with meals). 21 Tab 0    polyethylene glycol (MIRALAX) 17 gram/dose powder Take 17 g by mouth daily. (Patient taking differently: Take 17 g by mouth as needed.) 510 g 0    Wheat Dextrin (BENEFIBER CLEAR) 3 gram/3.5 gram pwpk Take 1 Each by mouth daily.  naloxone (NARCAN) 4 mg/actuation nasal spray 1 [place] by IntraNASal route as needed (respiratory depression). Give single spray into one nostril. Call 911. Give additional doses every 2 to 3 minutes alternating nostrils until assistance arrives using a new nasal spray with each dose, if patient does not respond or responds and then relapses. 1 Each 0    vit C/E/Zn/coppr/lutein/zeaxan (PRESERVISION AREDS 2 PO) Take 1 Tab by mouth two (2) times a day.  acetaminophen (TYLENOL) 325 mg tablet Take 650 mg by mouth every four (4) hours as needed for Pain. No Known Allergies    Objective: There were no vitals taken for this visit. Physical Exam:   Audio visit  No results found for this visit on 09/28/20. All results for lab orders may not have been returned by the time this encountered was closed. Assessment/Plan:       ICD-10-CM ICD-9-CM    1. Pharyngitis due to Streptococcus species  J02.0 034.0        Orders Placed This Encounter    pseudoephed/acetaminophen/cpm (DARLENE-SELTZER PLUS COLD PO)     Sig: Take  by mouth.  cefUROXime (CEFTIN) 500 mg tablet     Sig: Take 1 Tab by mouth two (2) times a day for 10 days.      Dispense:  20 Tab     Refill:  0       Plan:    Patient symptoms are most consistent with pharyngitis probably strep related. Rowdy Rodriguez, who was evaluated through a synchronous (real-time) audio only encounter, and/or his healthcare decision maker, is aware that it is a billable service, with coverage as determined by his insurance carrier. He provided verbal consent to proceed: Yes, and patient identification was verified. It was conducted pursuant to the emergency declaration under the [address] [place] [address] and the [employer] Act. A caregiver was present when appropriate. Ability to conduct physical exam was limited. I was in the office. The patient was at home. This telephone encounter lasted approximately 10 minutes with greater than 50% of the visit spent counseling the patient and his wife on the medication to be prescribed and the expected response to treatment. I have reviewed with the patient details of the assessment and plan and all questions were answered. Relevent patient education was performed. Verbal and/or written instructions (see AVS) provided. The most recent lab findings were reviewed with the patient. Plan was discussed with patient who verbal expressed understanding. An After Visit Summary was printed and given to the patient.       Aurora Mcintosh MD

## 2020-09-28 NOTE — PROGRESS NOTES
Shiv Sosa presents via phone call today for    Chief Complaint   Patient presents with    Cough     non productive    Nasal Congestion    Sore Throat        Wt Readings from Last 3 Encounters:   06/19/20 183 lb (83 kg)   04/28/20 187 lb 12.8 oz (85.2 kg)   12/26/19 185 lb (83.9 kg)     Temp Readings from Last 3 Encounters:   06/19/20 98 °F (36.7 °C) (Oral)   04/28/20 97.5 °F (36.4 °C) (Oral)   12/26/19 97.5 °F (36.4 °C) (Oral)     BP Readings from Last 3 Encounters:   06/19/20 130/80   04/28/20 140/78   12/26/19 136/78     Pulse Readings from Last 3 Encounters:   06/19/20 65   04/28/20 72   12/26/19 72       Health Maintenance Due   Topic    DTaP/Tdap/Td series (1 - Tdap)    Shingrix Vaccine Age 50> (1 of 2)    GLAUCOMA SCREENING Q2Y     Pneumococcal 65+ years (1 of 1 - PPSV23)    Flu Vaccine (1)         Learning Assessment:  :     Learning Assessment 11/10/2017   PRIMARY LEARNER Patient   HIGHEST LEVEL OF EDUCATION - PRIMARY LEARNER  SOME COLLEGE   BARRIERS PRIMARY LEARNER NONE   CO-LEARNER CAREGIVER No   PRIMARY LANGUAGE ENGLISH   LEARNER PREFERENCE PRIMARY DEMONSTRATION   ANSWERED BY patient   RELATIONSHIP SELF       Depression Screening:  :     3 most recent PHQ Screens 6/19/2020   Little interest or pleasure in doing things Not at all   Feeling down, depressed, irritable, or hopeless Not at all   Total Score PHQ 2 0       Fall Risk Assessment:  :     Fall Risk Assessment, last 12 mths 6/19/2020   Able to walk? Yes   Fall in past 12 months? No       Abuse Screening:  :     Abuse Screening Questionnaire 6/19/2020 4/28/2020 6/27/2019 12/20/2018 7/2/2018 6/20/2018   Do you ever feel afraid of your partner? N N N N N N   Are you in a relationship with someone who physically or mentally threatens you? N N N N N N   Is it safe for you to go home? Y Y Y Y Y -       Coordination of Care Questionnaire:  :     1. Have you been to the ER, urgent care clinic since your last visit?   Hospitalized since your last visit?no    2. Have you seen or consulted any other health care providers outside of the [address] [place] [address] since your last visit? Include any pap smears or colon screening.  no

## 2020-10-12 ENCOUNTER — OFFICE VISIT (OUTPATIENT)
Dept: INTERNAL MEDICINE CLINIC | Age: 85
End: 2020-10-12
Payer: MEDICARE

## 2020-10-12 VITALS
BODY MASS INDEX: 26.96 KG/M2 | RESPIRATION RATE: 16 BRPM | HEART RATE: 70 BPM | HEIGHT: 69 IN | OXYGEN SATURATION: 98 % | DIASTOLIC BLOOD PRESSURE: 80 MMHG | SYSTOLIC BLOOD PRESSURE: 140 MMHG | WEIGHT: 182 LBS | TEMPERATURE: 98.2 F

## 2020-10-12 DIAGNOSIS — R59.0 LEFT CERVICAL LYMPHADENOPATHY: Primary | ICD-10-CM

## 2020-10-12 DIAGNOSIS — L82.1 SK (SEBORRHEIC KERATOSIS): ICD-10-CM

## 2020-10-12 DIAGNOSIS — D22.9 MULTIPLE PIGMENTED NEVI: ICD-10-CM

## 2020-10-12 PROCEDURE — 1101F PT FALLS ASSESS-DOCD LE1/YR: CPT | Performed by: INTERNAL MEDICINE

## 2020-10-12 PROCEDURE — 99213 OFFICE O/P EST LOW 20 MIN: CPT | Performed by: INTERNAL MEDICINE

## 2020-10-12 PROCEDURE — G8427 DOCREV CUR MEDS BY ELIG CLIN: HCPCS | Performed by: INTERNAL MEDICINE

## 2020-10-12 PROCEDURE — G8432 DEP SCR NOT DOC, RNG: HCPCS | Performed by: INTERNAL MEDICINE

## 2020-10-12 PROCEDURE — G8419 CALC BMI OUT NRM PARAM NOF/U: HCPCS | Performed by: INTERNAL MEDICINE

## 2020-10-12 PROCEDURE — G8536 NO DOC ELDER MAL SCRN: HCPCS | Performed by: INTERNAL MEDICINE

## 2020-10-12 PROCEDURE — G8753 SYS BP > OR = 140: HCPCS | Performed by: INTERNAL MEDICINE

## 2020-10-12 PROCEDURE — G8754 DIAS BP LESS 90: HCPCS | Performed by: INTERNAL MEDICINE

## 2020-10-12 NOTE — PROGRESS NOTES
This note will not be viewable in [address] Ny Berg is a 80 y.o. male and presents with Mass (lump in left side of neck that is getting larger)  . Subjective:    Mr. Anastasia Fry presents today with complaint of a lump on the left side of his neck. He has had intermittent issues with swallowing and has noticed some swelling on the left side of his neck which has increased over the past month or so. He denies any night sweats or significant weight loss. His weight is down about 5 pounds from April of this year. He is not a smoker. He denies shortness of breath or cough. He does note some changes in his voice stating that his voice sounds gravelly at times. He would also like to be referred to dermatology for evaluation of skin changes with a history of seborrheic keratoses and a general skin exam.  He does not recall having any skin cancers removed in the past.       Review of Systems  Constitutional:   Eyes:   negative for visual disturbance and irritation  ENT:   negative for tinnitus,sore throat,nasal congestion,ear pains. hoarseness  Respiratory:  negative for cough, hemoptysis, dyspnea,wheezing  CV:   negative for chest pain, palpitations, lower extremity edema  GI:   negative for nausea, vomiting, diarrhea, abdominal pain,melena  Endo:               negative for polyuria,polydipsia,polyphagia,heat intolerance  Genitourinary: negative for frequency, dysuria and hematuria  Integumentary: negative for rash and pruritus  Hematologic:  negative for easy bruising and gum/nose bleeding  Musculoskel: negative for myalgias, arthralgias, back pain, muscle weakness, joint pain  Neurological:  negative for headaches, dizziness, vertigo, memory problems and gait   Behavl/Psych: negative for feelings of anxiety, depression, mood changes    Past Medical History:   Diagnosis Date    Allergic rhinitis 12/15/2017    Arthritis of knee, left 12/15/2017    Aseptic meningitis     Chronic pain     chronic back pain    Colon polyps     Constipation 12/15/2017    Dysphagia 12/15/2017    Elevated blood pressure reading 12/15/2017    Fatigue 12/15/2017    Headache 12/15/2017    Hematuria 12/15/2017    Herpes zoster dermatitis 12/15/2017    Hyperkalemia 12/15/2017    Hypertension 12/15/2017    Insomnia 12/15/2017    Osteoarthritis 12/15/2017    Otitis externa 12/15/2017    Prostate cancer screening 12/15/2017    Right groin hernia     Sciatica 12/15/2017    Sciatica of left side associated with disorder of lumbosacral spine 12/15/2017    Shingles     Spinal stenosis 12/15/2017     Past Surgical History:   Procedure Laterality Date    COLONOSCOPY N/A 11/29/2018    [facility]COPY    COLONOSCOPY,DIAGNOSTIC  11/11/2014         COLONOSCOPY,DIAGNOSTIC  11/29/2018         HX CATARACT REMOVAL Bilateral     with lens implants    HX COLONOSCOPY      HX HERNIA REPAIR Left     HX HERNIA REPAIR  06/05/2019    HX ORTHOPAEDIC  2008    rods placed in back    HX ORTHOPAEDIC  10/6/15    LEFT L5-S1 MICRODISCECTOMY     HX ORTHOPAEDIC  08/2018    glynn replacement, by Dr. Hitesh Villanueva      steroid injection for back pain    HX TONSILLECTOMY      [place] EGD INSERT GUIDE WIRE DILATOR PASSAGE ESOPHAGUS  10/17/2013         [place] EGD TRANSORAL BIOPSY SINGLE/MULTIPLE  10/17/2013          Social History     Socioeconomic History    Marital status:      Spouse name: Not on file    Number of children: Not on file    Years of education: Not on file    Highest education level: Not on file   Tobacco Use    Smoking status: Never Smoker    Smokeless tobacco: Never Used   Substance and Sexual Activity    Alcohol use: Never     Frequency: Never    Drug use: Never     Family History   Problem Relation Age of Onset    Lung Disease Mother         lung cancer    Hypertension Mother     Heart Failure Mother     Hypertension Father     Heart Failure Father      Current Outpatient Medications   Medication Sig Dispense Refill    ALPRAZolam (XANAX) 0.5 mg tablet TAKE 1 TABLET BY MOUTH THREE TIMES A DAY AS NEEDED FOR ANXIETY 90 Tab 0    gabapentin (NEURONTIN) 400 mg capsule TAKE 1 CAPSULE BY MOUTH 3 TIMES A DAY 90 Cap 3    amLODIPine (NORVASC) 5 mg tablet TAKE 1 TABLET BY MOUTH EVERY DAY 90 Tab 3    tiZANidine (ZANAFLEX) 4 mg tablet TAKE 1 TABLET BY MOUTH EVERY DAY AT NIGHT 90 Tab 3    ibuprofen (MOTRIN) 600 mg tablet Take 1 Tab by mouth three (3) times daily (with meals). 21 Tab 0    polyethylene glycol (MIRALAX) 17 gram/dose powder Take 17 g by mouth daily. (Patient taking differently: Take 17 g by mouth as needed.) 510 g 0    Wheat Dextrin (BENEFIBER CLEAR) 3 gram/3.5 gram pwpk Take 1 Each by mouth daily.  vit C/E/Zn/coppr/lutein/zeaxan (PRESERVISION AREDS 2 PO) Take 1 Tab by mouth two (2) times a day.  acetaminophen (TYLENOL) 325 mg tablet Take 650 mg by mouth every four (4) hours as needed for Pain.  pseudoephed/acetaminophen/cpm (DARLENE-SELTZER PLUS COLD PO) Take  by mouth.  naloxone (NARCAN) 4 mg/actuation nasal spray 1 [place] by IntraNASal route as needed (respiratory depression). Give single spray into one nostril. Call 911. Give additional doses every 2 to 3 minutes alternating nostrils until assistance arrives using a new nasal spray with each dose, if patient does not respond or responds and then relapses.  1 Each 0     No Known Allergies    Objective:  Visit Vitals  BP (!) 140/80 (BP 1 Location: Left arm, BP Patient Position: Sitting)   Pulse 70   Temp 98.2 °F (36.8 °C) (Oral)   Resp 16   Ht 5' 9\" (1.753 m)   Wt 182 lb (82.6 kg)   SpO2 98%   BMI 26.88 kg/m²     Physical Exam:   General appearance - alert, well appearing, and in no distress  Mental status - alert, oriented to person, place, and time  EYE-TRACY, EOMI, fundi normal, corneas normal, no foreign bodies  ENT-ENT exam normal, no neck nodes or sinus tenderness  Nose - normal and patent, no erythema, discharge or polyps  Mouth - mucous membranes moist, pharynx normal without lesions  Neck - supple, freely mobile lymph node left anterior cervical chain approximately 1 cm with firm consistency, freely mobile smaller approximately 6 mm nodule just left of the midline  Chest - clear to auscultation, no wheezes, rales or rhonchi, symmetric air entry   Heart - normal rate, regular rhythm, normal S1, S2, no murmurs, rubs, clicks or gallops   Abdomen - soft, nontender, nondistended, no masses or organomegaly  Lymph- no adenopathy palpable  Ext-peripheral pulses normal, no pedal edema, no clubbing or cyanosis  Skin-multiple seborrheic keratoses and nevi  Neuro -alert, oriented, normal speech, no focal findings or movement disorder noted  Musculoskeletal- FROM, no bony abnormalities, no point tenderness    No results found for this visit on 10/12/20. All results for lab orders may not have been returned by the time this encountered was closed. Assessment/Plan:       ICD-10-CM ICD-9-CM    1. Left cervical lymphadenopathy  R59.0 785.6 REFERRAL TO ENT-OTOLARYNGOLOGY   2. SK (seborrheic keratosis)  L82.1 702.19 REFERRAL TO DERMATOLOGY   3. Multiple pigmented nevi  D22.9 216.9 REFERRAL TO DERMATOLOGY       Orders Placed This Encounter    REFERRAL TO ENT-OTOLARYNGOLOGY     Referral Priority:   Routine     Referral Type:   Consultation     Referral Reason:   Specialty Services Required     Referred to Provider:   Leann Bravo MD     Requested Specialty:   Otolaryngology     Number of Visits Requested:   1    REFERRAL TO DERMATOLOGY     Referral Priority:   Routine     Referral Type:   Consultation     Referral Reason:   Specialty Services Required     Referred to Provider:   Adela Atwood MD     Requested Specialty:   Dermatology     Number of Visits Requested:   1              I have reviewed with the patient details of the assessment and plan and all questions were answered.  Relevent patient education was performed. Verbal and/or written instructions (see AVS) provided. The most recent lab findings were reviewed with the patient. Plan was discussed with patient who verbal expressed understanding. An After Visit Summary was printed and given to the patient.       Alejo Stone MD

## 2020-10-12 NOTE — PROGRESS NOTES
Donovan Russell presents today at the clinic for    Chief Complaint   Patient presents with    Mass     lump in left side of neck that is getting larger        Wt Readings from Last 3 Encounters:   10/12/20 182 lb (82.6 kg)   06/19/20 183 lb (83 kg)   04/28/20 187 lb 12.8 oz (85.2 kg)     Temp Readings from Last 3 Encounters:   10/12/20 98.2 °F (36.8 °C) (Oral)   06/19/20 98 °F (36.7 °C) (Oral)   04/28/20 97.5 °F (36.4 °C) (Oral)     BP Readings from Last 3 Encounters:   10/12/20 (!) 140/80   06/19/20 130/80   04/28/20 140/78     Pulse Readings from Last 3 Encounters:   10/12/20 70   06/19/20 65   04/28/20 72       Health Maintenance Due   Topic    DTaP/Tdap/Td series (1 - Tdap)    Shingrix Vaccine Age 50> (1 of 2)    GLAUCOMA SCREENING Q2Y     Pneumococcal 65+ years (1 of 1 - PPSV23)    Flu Vaccine (1)         Learning Assessment:  :     Learning Assessment 11/10/2017   PRIMARY LEARNER Patient   HIGHEST LEVEL OF EDUCATION - PRIMARY LEARNER  SOME COLLEGE   BARRIERS PRIMARY LEARNER NONE   CO-LEARNER CAREGIVER No   PRIMARY LANGUAGE ENGLISH   LEARNER PREFERENCE PRIMARY DEMONSTRATION   ANSWERED BY patient   RELATIONSHIP SELF       Depression Screening:  :     3 most recent PHQ Screens 6/19/2020   Little interest or pleasure in doing things Not at all   Feeling down, depressed, irritable, or hopeless Not at all   Total Score PHQ 2 0       Fall Risk Assessment:  :     Fall Risk Assessment, last 12 mths 6/19/2020   Able to walk? Yes   Fall in past 12 months? No       Abuse Screening:  :     Abuse Screening Questionnaire 6/19/2020 4/28/2020 6/27/2019 12/20/2018 7/2/2018 6/20/2018   Do you ever feel afraid of your partner? N N N N N N   Are you in a relationship with someone who physically or mentally threatens you? N N N N N N   Is it safe for you to go home? Y Y Y Y Y -       Coordination of Care Questionnaire:  :     1. Have you been to the ER, urgent care clinic since your last visit?   Hospitalized since your last visit? no    2. Have you seen or consulted any other health care providers outside of the [address] [place] [address] since your last visit? Include any pap smears or colon screening.  no

## 2020-10-20 ENCOUNTER — TRANSCRIBE ORDER (OUTPATIENT)
Dept: SCHEDULING | Age: 85
End: 2020-10-20

## 2020-10-20 DIAGNOSIS — Z78.9 NON-SMOKER: ICD-10-CM

## 2020-10-20 DIAGNOSIS — R22.1 MASS IN NECK: ICD-10-CM

## 2020-10-20 DIAGNOSIS — Z00.8 OTHER SPECIFIED GENERAL MEDICAL EXAMINATION: ICD-10-CM

## 2020-10-20 DIAGNOSIS — R13.10 DYSPHAGIA: ICD-10-CM

## 2020-10-20 DIAGNOSIS — R13.13 DYSPHAGIA, CRICOPHARYNGEAL: Primary | ICD-10-CM

## 2020-10-21 RX ORDER — AMLODIPINE BESYLATE 5 MG/1
TABLET ORAL
Qty: 30 TAB | Refills: 5 | Status: SHIPPED | OUTPATIENT
Start: 2020-10-21 | End: 2021-04-19

## 2020-10-21 RX ORDER — TIZANIDINE 4 MG/1
TABLET ORAL
Qty: 30 TAB | Refills: 17 | Status: SHIPPED | OUTPATIENT
Start: 2020-10-21 | End: 2021-11-10

## 2020-10-27 ENCOUNTER — HOSPITAL ENCOUNTER (OUTPATIENT)
Dept: GENERAL RADIOLOGY | Age: 85
Discharge: HOME | End: 2020-10-27
Attending: OTOLARYNGOLOGY
Payer: MEDICARE

## 2020-10-27 DIAGNOSIS — R22.1 MASS IN NECK: ICD-10-CM

## 2020-10-27 DIAGNOSIS — R13.10 DYSPHAGIA: ICD-10-CM

## 2020-10-27 DIAGNOSIS — Z00.8 OTHER SPECIFIED GENERAL MEDICAL EXAMINATION: ICD-10-CM

## 2020-10-27 DIAGNOSIS — Z78.9 NON-SMOKER: ICD-10-CM

## 2020-10-27 DIAGNOSIS — R13.13 DYSPHAGIA, CRICOPHARYNGEAL: ICD-10-CM

## 2020-10-27 PROCEDURE — 74230 X-RAY XM SWLNG FUNCJ C+: CPT

## 2020-10-27 PROCEDURE — 92611 MOTION FLUOROSCOPY/SWALLOW: CPT

## 2020-10-27 NOTE — PROGRESS NOTES
[address] [place] [address] MODIFIED BARIUM SWALLOW STUDY  Patient: Ceasar Valerio (89 y.o. male)  Date: 10/27/2020  Referring Provider:  Dr. Carlo Stauffer:   Patient reported that he has a tickle in his throat every time he drinks which makes him cough. Patient also reported certain foods get stuck in his throat, specifically raisins, sweet food, and bread, which he later regurgitates. Patient was seen by ENT due to L neck mass, however per patient report, ENT was not concerned about mass. Patient also reported voice changes during which his voice will become hoarse, however his voice is normal today. Patient reported this began 3-4 weeks ago and occurs every time he eats/drinks. Patient denied significant neurological, pulmonary, or GI history.      OBJECTIVE:   Past Medical History:   Past Medical History:   Diagnosis Date    Allergic rhinitis 12/15/2017    Arthritis of knee, left 12/15/2017    Aseptic meningitis     Chronic pain     chronic back pain    Colon polyps     Constipation 12/15/2017    Dysphagia 12/15/2017    Elevated blood pressure reading 12/15/2017    Fatigue 12/15/2017    Headache 12/15/2017    Hematuria 12/15/2017    Herpes zoster dermatitis 12/15/2017    Hyperkalemia 12/15/2017    Hypertension 12/15/2017    Insomnia 12/15/2017    Osteoarthritis 12/15/2017    Otitis externa 12/15/2017    Prostate cancer screening 12/15/2017    Right groin hernia     Sciatica 12/15/2017    Sciatica of left side associated with disorder of lumbosacral spine 12/15/2017    Shingles     Spinal stenosis 12/15/2017     Past Surgical History:   Procedure Laterality Date    COLONOSCOPY N/A 11/29/2018    COLONOSCOPY performed by Samuel Chavarria MD at [facility] ENDOSCOPY    COLONOSCOPY,DIAGNOSTIC  11/11/2014         COLONOSCOPY,DIAGNOSTIC  11/29/2018         HX CATARACT REMOVAL Bilateral     with lens implants    HX COLONOSCOPY      HX HERNIA REPAIR Left     HX HERNIA REPAIR  06/05/2019    HX ORTHOPAEDIC  2008    rods placed in back    HX ORTHOPAEDIC  10/6/15    LEFT L5-S1 MICRODISCECTOMY     HX ORTHOPAEDIC  08/2018    glynn replacement, by Dr. Tania Schirmer      steroid injection for back pain    HX TONSILLECTOMY      [place] EGD INSERT GUIDE WIRE DILATOR PASSAGE ESOPHAGUS  10/17/2013         [place] EGD TRANSORAL BIOPSY SINGLE/MULTIPLE  10/17/2013          Current Dietary Status:  Regular/thin  Radiologist: Dr. Arellano Bicker: Lateral;Fluoro  Patient Position: standing upright     Trial 1:   Consistency Presented: Thin liquid;Puree; Solid   How Presented: Self-fed/presented;Cup/sip;Cup/gulp;Straw;SLP-fed/presented;Spoon       Bolus Acceptance: No impairment   Bolus Formation/Control: No impairment:             Initiation of Swallow: No impairment   Timing: No impairment   Penetration: During swallow; After swallow   Aspiration/Timing: After   Pharyngeal Clearance: Vallecular residue;Pyriform residue ;Greater than 50%   Attempted Modifications: Double swallow; Alternate liquids/solids   Effective Modifications: Double swallow; Alternate liquids/solids   Cues for Modifications: Minimal           Decreased Tongue Base Retraction?: Yes  Laryngeal Elevation: Inadequate epiglottic inversion; Incomplete laryngeal closure; Reduced excursion with laryngeal vestibule gap  Aspiration/Penetration Score: 8 (Aspiration-Contrast passes cords/glottis with no effort to eject, ie/silent aspiration)  Pharyngeal Symmetry: Not assessed  Pharyngeal-Esophageal Segment: Decreased relaxation of upper esophageal segment  Pharyngeal Dysfunction: Decreased tongue base retraction;Decreased strength;Decreased elevation/closure;Decreased pharyngeal wall constriction    Oral Phase Severity: No impairment  Pharyngeal Phase Severity: Moderately severe     The NOMS functional outcome measure was used to quantify this patient's level of swallowing impairment. Based on the NOMS, the patient was determined to be at level 5 for swallow function         NOMS Swallowing Levels:  Level 1 (CN): NPO  Level 2 (CM): NPO but takes consistency in therapy  Level 3 (CL): Takes less than 50% of nutrition p.o. and continues with nonoral feedings; and/or safe with mod cues; and/or max diet restriction  Level 4 (CK): Safe swallow but needs mod cues; and/or mod diet restriction; and/or still requires some nonoral feeding/supplements  Level 5 (CJ): Safe swallow with min diet restriction; and/or needs min cues  Level 6 (CI): Independent with p.o.; rare cues; usually self cues; may need to avoid some foods or needs extra time  Level 7 ([address] [place] [address]): Independent for all p.o.  SANAM. (2003). National Outcomes Measurement System (NOMS): Adult Speech-Language Pathology User's Guide. ASSESSMENT :  Based on the objective data described above, the patient presents with moderate-severe pharyngeal dysphagia characterized by no epiglottic inversion and reduced tongue base retraction, anterior hyoid excursion, pharyngeal stripping wave, laryngeal elevation/closure, and PES opening. This results in majority of bolus remaining in vallecula and pyriform sinus, with puree bolus streaming over airway. Patient spontaneous elicited additional swallows which only minimally reduced residue. Liquid wash was more effective in reducing puree and solid residue, however collection remained in vallecula and pyriform sinuses. Patient with repeated penetration and aspiration of thin liquids both during and after the swallow secondary to reduced laryngeal elevation/closure and pharyngeal residue. Patient with excellent sensation resulting in spontaneous additional swallow, throat clear, or cough which consistently cleared aspiration and penetrated material from vocal folds (however penetrated material remained in laryngeal vestibule).  Patient with one episode of silent aspiration with residue (? If thin liquid vs puree) that did not clear spontaneously but did clear with a cued cough. Patient with wet vocal quality when liquid penetrated to the vocal folds during MBS, and note wet vocal quality followed by light throat clear when talking after MBS as well. PLAN/RECOMMENDATIONS :  -Mechanical soft/thin liquid diet as patient is overall healthy for age, has not experienced respiratory issues, and has excellent sensation  -Alternate bite/sip. Extra swallow as needed. Throat clear/cough as needed   -If patient is ever hospitalized, suspect swallow function will decline and goals of care related to nutrition will likely need to be addressed   -OP SLP treatment to trial pharyngeal swallowing exercises  -Question etiology of dysphagia? Could patient benefit from further neurological and GI workup? COMMUNICATION/EDUCATION:   The above findings and recommendations were discussed with: patient at length who verbalized understanding.     Thank you for this referral.  Tanya Suarez, SLP  Time Calculation: 45 mins

## 2020-11-04 ENCOUNTER — OFFICE VISIT (OUTPATIENT)
Dept: INTERNAL MEDICINE CLINIC | Age: 85
End: 2020-11-04
Payer: MEDICARE

## 2020-11-04 VITALS
HEIGHT: 69 IN | RESPIRATION RATE: 16 BRPM | TEMPERATURE: 97.8 F | DIASTOLIC BLOOD PRESSURE: 78 MMHG | BODY MASS INDEX: 27.55 KG/M2 | OXYGEN SATURATION: 97 % | WEIGHT: 186 LBS | HEART RATE: 66 BPM | SYSTOLIC BLOOD PRESSURE: 136 MMHG

## 2020-11-04 DIAGNOSIS — R13.12 OROPHARYNGEAL DYSPHAGIA: Primary | ICD-10-CM

## 2020-11-04 DIAGNOSIS — I10 ESSENTIAL HYPERTENSION: ICD-10-CM

## 2020-11-04 DIAGNOSIS — M47.26 OSTEOARTHRITIS OF SPINE WITH RADICULOPATHY, LUMBAR REGION: ICD-10-CM

## 2020-11-04 PROCEDURE — G8754 DIAS BP LESS 90: HCPCS | Performed by: INTERNAL MEDICINE

## 2020-11-04 PROCEDURE — G8419 CALC BMI OUT NRM PARAM NOF/U: HCPCS | Performed by: INTERNAL MEDICINE

## 2020-11-04 PROCEDURE — G8536 NO DOC ELDER MAL SCRN: HCPCS | Performed by: INTERNAL MEDICINE

## 2020-11-04 PROCEDURE — G8752 SYS BP LESS 140: HCPCS | Performed by: INTERNAL MEDICINE

## 2020-11-04 PROCEDURE — G8427 DOCREV CUR MEDS BY ELIG CLIN: HCPCS | Performed by: INTERNAL MEDICINE

## 2020-11-04 PROCEDURE — 1101F PT FALLS ASSESS-DOCD LE1/YR: CPT | Performed by: INTERNAL MEDICINE

## 2020-11-04 PROCEDURE — 99214 OFFICE O/P EST MOD 30 MIN: CPT | Performed by: INTERNAL MEDICINE

## 2020-11-04 PROCEDURE — G8432 DEP SCR NOT DOC, RNG: HCPCS | Performed by: INTERNAL MEDICINE

## 2020-11-04 NOTE — PROGRESS NOTES
Meredith Metzger presents today at the clinic for    Chief Complaint   Patient presents with    Dysphagia     follow up from barium swallow        Wt Readings from Last 3 Encounters:   11/04/20 186 lb (84.4 kg)   10/12/20 182 lb (82.6 kg)   06/19/20 183 lb (83 kg)     Temp Readings from Last 3 Encounters:   11/04/20 97.8 °F (36.6 °C) (Oral)   10/12/20 98.2 °F (36.8 °C) (Oral)   06/19/20 98 °F (36.7 °C) (Oral)     BP Readings from Last 3 Encounters:   11/04/20 (!) 148/78   10/12/20 (!) 140/80   06/19/20 130/80     Pulse Readings from Last 3 Encounters:   11/04/20 66   10/12/20 70   06/19/20 65       Health Maintenance Due   Topic    DTaP/Tdap/Td series (1 - Tdap)    Shingrix Vaccine Age 50> (1 of 2)    GLAUCOMA SCREENING Q2Y     Pneumococcal 65+ years (1 of 1 - PPSV23)    Flu Vaccine (1)         Learning Assessment:  :     Learning Assessment 11/10/2017   PRIMARY LEARNER Patient   HIGHEST LEVEL OF EDUCATION - PRIMARY LEARNER  SOME COLLEGE   BARRIERS PRIMARY LEARNER NONE   CO-LEARNER CAREGIVER No   PRIMARY LANGUAGE ENGLISH   LEARNER PREFERENCE PRIMARY DEMONSTRATION   ANSWERED BY patient   RELATIONSHIP SELF       Depression Screening:  :     3 most recent PHQ Screens 6/19/2020   Little interest or pleasure in doing things Not at all   Feeling down, depressed, irritable, or hopeless Not at all   Total Score PHQ 2 0       Fall Risk Assessment:  :     Fall Risk Assessment, last 12 mths 6/19/2020   Able to walk? Yes   Fall in past 12 months? No       Abuse Screening:  :     Abuse Screening Questionnaire 6/19/2020 4/28/2020 6/27/2019 12/20/2018 7/2/2018 6/20/2018   Do you ever feel afraid of your partner? N N N N N N   Are you in a relationship with someone who physically or mentally threatens you? N N N N N N   Is it safe for you to go home? Y Y Y Y Y -       Coordination of Care Questionnaire:  :     1. Have you been to the ER, urgent care clinic since your last visit? Hospitalized since your last visit?no    2. Have you seen or consulted any other health care providers outside of the [address] [place] [address] since your last visit? Include any pap smears or colon screening.    Barium Swallow 10/27/20

## 2020-11-04 NOTE — PROGRESS NOTES
This note will not be viewable in [address] Paola Bacon is a 80 y.o. male and presents with Dysphagia (follow up from barium swallow)  . Subjective:  Mr. Luca Soni presents today for follow-up of dysphagia. He had been having hoarseness of voice and occasional coughing after eating. He was referred to ENT for further evaluation after noticing a nodule on his neck. He had a direct laryngoscopy which did not reveal any abnormality. He was referred for a modified barium swallow which showed minimal penetration of thin liquids and accumulation material in the vallecula. His symptoms really have not progressed. He has been more conscious of chewing his food properly and taking time to swallow completely. You alternate sips and clear with a cough if needed. He does not feel this has progressed. He is not interested in having further work-up for this at this time. We discussed follow-up with GI or neurology if symptoms do progress. He remains on amlodipine 5 mg daily for hypertension with good results. He denies any side effects with his medication. He has no shortness of breath, chest pain, palpitations, PND, orthopnea, or pedal edema. He continues to have chronic low back pain status post previous lumbar laminectomy and previous history of sciatica with radiculopathy. He remains on gabapentin daily. He takes Tylenol as needed for back pain. He will occasionally use tizanidine if needed. He does do inversion therapy which he feels helps his back tremendously. He remains relatively active.       Past Medical History:   Diagnosis Date    Allergic rhinitis 12/15/2017    Arthritis of knee, left 12/15/2017    Aseptic meningitis     Chronic pain     chronic back pain    Colon polyps     Constipation 12/15/2017    Dysphagia 12/15/2017    Elevated blood pressure reading 12/15/2017    Fatigue 12/15/2017    Headache 12/15/2017    Hematuria 12/15/2017    Herpes zoster dermatitis 12/15/2017  Hyperkalemia 12/15/2017    Hypertension 12/15/2017    Insomnia 12/15/2017    Osteoarthritis 12/15/2017    Otitis externa 12/15/2017    Prostate cancer screening 12/15/2017    Right groin hernia     Sciatica 12/15/2017    Sciatica of left side associated with disorder of lumbosacral spine 12/15/2017    Shingles     Spinal stenosis 12/15/2017     Past Surgical History:   Procedure Laterality Date    COLONOSCOPY N/A 11/29/2018    COLON[facility]co [facility]  11/11/2014         COLONOSCOPY,DIAGNOSTIC  11/29/2018         HX CATARACT REMOVAL Bilateral     with lens implants    HX COLONOSCOPY      HX HERNIA REPAIR Left     HX HERNIA REPAIR  06/05/2019    HX ORTHOPAEDIC  2008    rods placed in back    HX ORTHOPAEDIC  10/6/15    LEFT L5-S1 MICRODISCECTOMY     HX ORTHOPAEDIC  08/2018    glynn replacement, by Dr. Tristan Nose      steroid injection for back pain    HX TONSILLECTOMY      [facility] EGD INSERT GUIDE WIRE DILATOR PASSAGE ESOPHAGUS  10/17/2013         [place] EGD TRANSORAL BIOPSY SINGLE/MULTIPLE  10/17/2013          No Known Allergies  Current Outpatient Medications   Medication Sig Dispense Refill    tiZANidine (ZANAFLEX) 4 mg tablet TAKE 1 TABLET BY MOUTH EVERY DAY EVERY NIGHT 30 Tab 17    amLODIPine (NORVASC) 5 mg tablet TAKE 1 TABLET BY MOUTH EVERY DAY 30 Tab 5    pseudoephed/acetaminophen/cpm (DARLENE-SELTZER PLUS COLD PO) Take  by mouth.  ALPRAZolam (XANAX) 0.5 mg tablet TAKE 1 TABLET BY MOUTH THREE TIMES A DAY AS NEEDED FOR ANXIETY 90 Tab 0    gabapentin (NEURONTIN) 400 mg capsule TAKE 1 CAPSULE BY MOUTH 3 TIMES A DAY 90 Cap 3    ibuprofen (MOTRIN) 600 mg tablet Take 1 Tab by mouth three (3) times daily (with meals). 21 Tab 0    polyethylene glycol (MIRALAX) 17 gram/dose powder Take 17 g by mouth daily.  (Patient taking differently: Take 17 g by mouth as needed.) 510 g 0    Wheat Dextrin (BENEFIBER CLEAR) 3 gram/3.5 gram pwpk Take 1 Each by mouth daily.  naloxone (NARCAN) 4 mg/actuation nasal spray 1 Milfay by IntraNASal route as needed (respiratory depression). Give single spray into one nostril. Call 911. Give additional doses every 2 to 3 minutes alternating nostrils until assistance arrives using a new nasal spray with each dose, if patient does not respond or responds and then relapses. 1 Each 0    vit C/E/Zn/coppr/lutein/zeaxan (PRESERVISION AREDS 2 PO) Take 1 Tab by mouth two (2) times a day.  acetaminophen (TYLENOL) 325 mg tablet Take 650 mg by mouth every four (4) hours as needed for Pain. Social History     Socioeconomic History    Marital status:      Spouse name: Not on file    Number of children: Not on file    Years of education: Not on file    Highest education level: Not on file   Tobacco Use    Smoking status: Never Smoker    Smokeless tobacco: Never Used   Substance and Sexual Activity    Alcohol use: Never     Frequency: Never    Drug use: Never     Family History   Problem Relation Age of Onset    Lung Disease Mother         lung cancer    Hypertension Mother     Heart Failure Mother     Hypertension Father     Heart Failure Father        Review of Systems  Constitutional:  negative for fevers, chills, anorexia and weight loss  Eyes:    negative for visual disturbance and irritation  ENT:    negative for tinnitus,sore throat,nasal congestion,ear pains. hoarseness  Respiratory:     negative for cough, hemoptysis, dyspnea,wheezing  CV:    negative for chest pain, palpitations, lower extremity edema  GI:    negative for nausea, vomiting, diarrhea, abdominal pain,melena  Endo:               negative for polyuria,polydipsia,polyphagia,heat intolerance  Genitourinary : negative for frequency, dysuria and hematuria  Integumentary: negative for rash and pruritus  Hematologic:   negative for easy bruising and gum/nose bleeding  Musculoskel:  negative for myalgias, arthralgias, back pain, muscle weakness, joint pain  Neurological:   negative for headaches, dizziness, vertigo, memory problems and gait   Behavl/Psych:  negative for feelings of anxiety, depression, mood changes  ROS otherwise negative      Objective:  Visit Vitals  /78 (BP 1 Location: Left arm, BP Patient Position: Sitting)   Pulse 66   Temp 97.8 °F (36.6 °C) (Oral)   Resp 16   Ht 5' 9\" (1.753 m)   Wt 186 lb (84.4 kg)   SpO2 97%   BMI 27.47 kg/m²     Physical Exam:   General appearance - alert, well appearing, and in no distress  Mental status - alert, oriented to person, place, and time  EYE-TRACY, EOMI, fundi normal, corneas normal, no foreign bodies  ENT-ENT exam normal, no neck nodes or sinus tenderness  Nose - normal and patent, no erythema, discharge or polyps  Mouth - mucous membranes moist, pharynx normal without lesions  Neck - supple, no significant adenopathy   Chest - clear to auscultation, no wheezes, rales or rhonchi, symmetric air entry   Heart - normal rate, regular rhythm, normal S1, S2, no murmurs, rubs, clicks or gallops   Abdomen - soft, nontender, nondistended, no masses or organomegaly  Lymph- no adenopathy palpable  Ext-peripheral pulses normal, no pedal edema, no clubbing or cyanosis  Skin-Warm and dry. no hyperpigmentation, vitiligo, or suspicious lesions  Neuro -alert, oriented, normal speech, no focal findings or movement disorder noted      Assessment/Plan:  Diagnoses and all orders for this visit:    1. Oropharyngeal dysphagia    2. Essential hypertension    3. Osteoarthritis of spine with radiculopathy, lumbar region          ICD-10-CM ICD-9-CM    1. Oropharyngeal dysphagia  R13.12 787.22    2. Essential hypertension  I10 401.9    3. Osteoarthritis of spine with radiculopathy, lumbar region  M47.26 721.3      Plan:    The patient did have minimal changes by modified barium swallow evaluation. I have offered him follow-up with speech therapy as an outpatient which she is declined at this time. We also discussed referral to neurology or GI should symptoms progress. As his symptoms do not seem to be bothersome at this time he does not wish to pursue any further work-up. He will make a more conscious effort to chew his food completely and take his time swallowing. He may alternate with sips and cough as needed to clear his throat. He will try to avoid talking while he is eating which has been a trigger for him in the past.  He will continue his current medical regimen and follow-up in 6 months unless otherwise indicated. Continue current medical regimen as outlined above. Patient will be due for a Medicare annual wellness review after December 22, 2020. He will be in [place] for the next few months and will return in 6 months at which time we will update his wellness visit. I have reviewed with the patient details of the assessment and plan and all questions were answered. Relevent patient education was performed. Verbal and/or written instructions (see AVS) provided. The most recent lab findings were reviewed with the patient. Plan was discussed with patient who verbally expressed understanding. An After Visit Summary was printed and given to the patient.     Kenyetta Haynes MD

## 2020-11-24 DIAGNOSIS — M48.061 SPINAL STENOSIS OF LUMBAR REGION, UNSPECIFIED WHETHER NEUROGENIC CLAUDICATION PRESENT: ICD-10-CM

## 2020-11-24 DIAGNOSIS — F41.9 ANXIETY: ICD-10-CM

## 2020-11-25 RX ORDER — GABAPENTIN 400 MG/1
CAPSULE ORAL
Qty: 90 CAP | Refills: 2 | Status: SHIPPED | OUTPATIENT
Start: 2020-11-25 | End: 2021-02-22

## 2020-11-25 RX ORDER — ALPRAZOLAM 0.5 MG/1
TABLET ORAL
Qty: 90 TAB | Refills: 0 | Status: SHIPPED | OUTPATIENT
Start: 2020-11-25 | End: 2020-12-23

## 2020-12-16 ENCOUNTER — APPOINTMENT (OUTPATIENT)
Dept: INTERNAL MEDICINE CLINIC | Age: 85
End: 2020-12-16
Payer: MEDICARE

## 2020-12-16 DIAGNOSIS — I10 ESSENTIAL HYPERTENSION: ICD-10-CM

## 2020-12-16 DIAGNOSIS — Z13.6 SCREENING FOR ISCHEMIC HEART DISEASE: ICD-10-CM

## 2020-12-16 LAB
A-G RATIO,AGRAT: 1.8 RATIO
ALBUMIN SERPL-MCNC: 4.2 G/DL (ref 3.9–5.4)
ALP SERPL-CCNC: 70 U/L (ref 38–126)
ALT SERPL-CCNC: 11 U/L (ref 0–50)
ANION GAP SERPL CALC-SCNC: 6 MMOL/L
AST SERPL W P-5'-P-CCNC: 20 U/L (ref 14–36)
BILIRUB SERPL-MCNC: 0.5 MG/DL (ref 0.2–1.3)
BILIRUB UR QL: NEGATIVE
BUN SERPL-MCNC: 15 MG/DL (ref 9–20)
BUN/CREATININE RATIO,BUCR: 19 RATIO
CALCIUM SERPL-MCNC: 9.6 MG/DL (ref 8.4–10.2)
CHLORIDE SERPL-SCNC: 103 MMOL/L (ref 98–107)
CHOL/HDL RATIO,CHHD: 4 RATIO (ref 0–4)
CHOLEST SERPL-MCNC: 193 MG/DL (ref 0–200)
CLARITY: CLEAR
CO2 SERPL-SCNC: 34 MMOL/L (ref 22–32)
COLOR UR: ABNORMAL
CREAT SERPL-MCNC: 0.8 MG/DL (ref 0.8–1.5)
GLOBULIN,GLOB: 2.4
GLUCOSE 24H UR-MRATE: NEGATIVE G/(24.H)
GLUCOSE SERPL-MCNC: 98 MG/DL (ref 75–110)
HDLC SERPL-MCNC: 45 MG/DL (ref 35–130)
HGB UR QL STRIP: NEGATIVE
KETONES UR QL STRIP.AUTO: NEGATIVE
LDL/HDL RATIO,LDHD: 3 RATIO
LDLC SERPL CALC-MCNC: 126 MG/DL (ref 0–130)
LEUKOCYTE ESTERASE: NEGATIVE
NITRITE UR QL STRIP.AUTO: NEGATIVE
PH UR STRIP: 6.5 [PH] (ref 5–7)
POTASSIUM SERPL-SCNC: 4.5 MMOL/L (ref 3.6–5)
PROT SERPL-MCNC: 6.6 G/DL (ref 6.3–8.2)
PROT UR STRIP-MCNC: NEGATIVE MG/DL
SODIUM SERPL-SCNC: 143 MMOL/L (ref 137–145)
SP GR UR REFRACTOMETRY: 1.02 (ref 1–1.03)
TRIGL SERPL-MCNC: 111 MG/DL (ref 0–200)
UROBILINOGEN UR QL STRIP.AUTO: NEGATIVE
VLDLC SERPL CALC-MCNC: 22 MG/DL

## 2020-12-16 PROCEDURE — 80061 LIPID PANEL: CPT | Performed by: INTERNAL MEDICINE

## 2020-12-16 PROCEDURE — 81003 URINALYSIS AUTO W/O SCOPE: CPT | Performed by: INTERNAL MEDICINE

## 2020-12-16 PROCEDURE — 80053 COMPREHEN METABOLIC PANEL: CPT | Performed by: INTERNAL MEDICINE

## 2020-12-23 ENCOUNTER — OFFICE VISIT (OUTPATIENT)
Dept: INTERNAL MEDICINE CLINIC | Age: 85
End: 2020-12-23
Payer: MEDICARE

## 2020-12-23 VITALS
RESPIRATION RATE: 14 BRPM | HEIGHT: 69 IN | WEIGHT: 186 LBS | OXYGEN SATURATION: 98 % | BODY MASS INDEX: 27.55 KG/M2 | HEART RATE: 70 BPM | TEMPERATURE: 98.9 F | SYSTOLIC BLOOD PRESSURE: 138 MMHG | DIASTOLIC BLOOD PRESSURE: 68 MMHG

## 2020-12-23 DIAGNOSIS — I10 ESSENTIAL HYPERTENSION: ICD-10-CM

## 2020-12-23 DIAGNOSIS — M47.26 OSTEOARTHRITIS OF SPINE WITH RADICULOPATHY, LUMBAR REGION: ICD-10-CM

## 2020-12-23 DIAGNOSIS — Z00.00 MEDICARE ANNUAL WELLNESS VISIT, SUBSEQUENT: Primary | ICD-10-CM

## 2020-12-23 DIAGNOSIS — Z13.31 SCREENING FOR DEPRESSION: ICD-10-CM

## 2020-12-23 DIAGNOSIS — Z13.6 SCREENING FOR ISCHEMIC HEART DISEASE: ICD-10-CM

## 2020-12-23 DIAGNOSIS — Z13.1 SCREENING FOR DIABETES MELLITUS: ICD-10-CM

## 2020-12-23 PROCEDURE — G8752 SYS BP LESS 140: HCPCS | Performed by: INTERNAL MEDICINE

## 2020-12-23 PROCEDURE — 1101F PT FALLS ASSESS-DOCD LE1/YR: CPT | Performed by: INTERNAL MEDICINE

## 2020-12-23 PROCEDURE — G8419 CALC BMI OUT NRM PARAM NOF/U: HCPCS | Performed by: INTERNAL MEDICINE

## 2020-12-23 PROCEDURE — G8754 DIAS BP LESS 90: HCPCS | Performed by: INTERNAL MEDICINE

## 2020-12-23 PROCEDURE — 99214 OFFICE O/P EST MOD 30 MIN: CPT | Performed by: INTERNAL MEDICINE

## 2020-12-23 PROCEDURE — G8536 NO DOC ELDER MAL SCRN: HCPCS | Performed by: INTERNAL MEDICINE

## 2020-12-23 PROCEDURE — G8432 DEP SCR NOT DOC, RNG: HCPCS | Performed by: INTERNAL MEDICINE

## 2020-12-23 PROCEDURE — G8427 DOCREV CUR MEDS BY ELIG CLIN: HCPCS | Performed by: INTERNAL MEDICINE

## 2020-12-23 PROCEDURE — G0439 PPPS, SUBSEQ VISIT: HCPCS | Performed by: INTERNAL MEDICINE

## 2020-12-23 NOTE — PATIENT INSTRUCTIONS
Medicare Wellness Visit, Male The best way to live healthy is to have a lifestyle where you eat a well-balanced diet, exercise regularly, limit alcohol use, and quit all forms of tobacco/nicotine, if applicable. Regular preventive services are another way to keep healthy. Preventive services (vaccines, screening tests, monitoring & exams) can help personalize your care plan, which helps you manage your own care. Screening tests can find health problems at the earliest stages, when they are easiest to treat. [facility] follows the current, evidence-based guidelines published by the [facility] ([facility]) when recommending preventive services for our patients. Because we follow these guidelines, sometimes recommendations change over time as research supports it. (For example, a prostate screening blood test is no longer routinely recommended for men with no symptoms). Of course, you and your doctor may decide to screen more often for some diseases, based on your risk and co-morbidities (chronic disease you are already diagnosed with). Preventive services for you include: - Medicare offers their members a free annual wellness visit, which is time for you and your primary care provider to discuss and plan for your preventive service needs. Take advantage of this benefit every year! 
-All adults over age 72 should receive the recommended pneumonia vaccines. Current USPSTF guidelines recommend a series of two vaccines for the best pneumonia protection.  
-All adults should have a flu vaccine yearly and tetanus vaccine every 10 years. 
-All adults age 48 and older should receive the shingles vaccines (series of two vaccines). -All adults age 38-68 who are overweight should have a diabetes screening test once every three years. -Other screening tests & preventive services for persons with diabetes include: an eye exam to screen for diabetic retinopathy, a kidney function test, a foot exam, and stricter control over your cholesterol.  
-Cardiovascular screening for adults with routine risk involves an electrocardiogram (ECG) at intervals determined by the provider.  
-Colorectal cancer screening should be done for adults age 54-65 with no increased risk factors for colorectal cancer. There are a number of acceptable methods of screening for this type of cancer. Each test has its own benefits and drawbacks. Discuss with your provider what is most appropriate for you during your annual wellness visit. The different tests include: colonoscopy (considered the best screening method), a fecal occult blood test, a fecal DNA test, and sigmoidoscopy. 
-All adults born between [facility] should be screened once for Hepatitis C. 
-An Abdominal Aortic Aneurysm (AAA) Screening is recommended for men age 73-68 who has ever smoked in their lifetime. Here is a list of your current Health Maintenance items (your personalized list of preventive services) with a due date: 
Health Maintenance Due Topic Date Due  
 DTaP/Tdap/Td  (1 - Tdap) 05/09/1956  Shingles Vaccine (1 of 2) 05/09/1985  Glaucoma Screening   05/09/2000  Pneumococcal Vaccine (1 of 1 - PPSV23) 05/09/2000

## 2020-12-23 NOTE — LETTER
12/23/2020 Mr. [address] [id number] Dear Zakia Klein: 
 
Please find your most recent results below. Resulted Orders URINALYSIS W/O MICRO (Collected: 12/16/2020  8:11 AM) Result Value Ref Range Color yumiko (A) Pale Yellow - Yellow CLARITY clear Clear Glucose urine, 24 hr Negative Negative Ketone Negative Negative Bilirubin Negative Negative Specific gravity 1.020 1.000 - 1.030  
 pH (UA) 6.5 5 - 7 Blood Negative Negative Protein Negative Negative Urobilinogen Negative Negative Nitrites Negative Negative Leukocyte Esterase Negative Negative LIPID PANEL (Collected: 12/16/2020  8:08 AM) Result Value Ref Range Cholesterol, total 193 0 - 200 mg/dL Triglyceride 111 0 - 200 mg/dL HDL Cholesterol 45 35 - 130 mg/dL VLDL 22 mg/dL LDL, calculated 126 0 - 130 mg/dL CHOL/HDL Ratio 4 0 - 4 Ratio LDL/HDL Ratio 3 Ratio METABOLIC PANEL, COMPREHENSIVE (Collected: 12/16/2020  8:08 AM) Result Value Ref Range Glucose 98 75 - 110 mg/dL BUN 15.0 9.0 - 20.0 mg/dL Creatinine 0.8 0.8 - 1.5 mg/dL Sodium 143 137 - 145 mmol/L Potassium 4.5 3.6 - 5.0 mmol/L Chloride 103 98 - 107 mmol/L  
 CO2 34.0 (H) 22.0 - 32.0 mmol/L Calcium 9.6 8.4 - 10.2 mg/dl Protein, total 6.6 6.3 - 8.2 g/dL Albumin 4.2 3.9 - 5.4 g/dL ALT (SGPT) 11 0 - 50 U/L  
 AST (SGOT) 20.0 14.0 - 36.0 U/L Alk. phosphatase 70 38 - 126 U/L Bilirubin, total 0.5 0.2 - 1.3 mg/dL BUN/Creatinine ratio 19 Ratio GFR est AA >60 mL/min/1.73m2 Comment:  
   Using the modified MDRD study equations, GFR calculations are not valid  
in patients less than 25years of age. GFR est non-AA >60 mL/min/1.73m2 Comment:  
   Using the modified MDRD study equations, GFR calculations are not valid  
in patients less than 25years of age. Globulin 2.40 A-G Ratio 1.8 Ratio  Anion gap 6 mmol/L  
 
 RECOMMENDATIONS: 
None. Keep up the good work! Please call me if you have any questions: [phone] Sincerely, Yobani Carrillo MD

## 2020-12-23 NOTE — PROGRESS NOTES
Ceasar Valerio is a 80 y.o. male presenting for Hypertension (6 javad fu)  . 1. Have you been to the ER, urgent care clinic since your last visit? Hospitalized since your last visit? No    2. Have you seen or consulted any other health care providers outside of the [address] [place] [address] since your last visit? Include any pap smears or colon screening. No    Fall Risk Assessment, last 12 mths 6/19/2020   Able to walk? Yes   Fall in past 12 months? No         Abuse Screening Questionnaire 6/19/2020   Do you ever feel afraid of your partner? N   Are you in a relationship with someone who physically or mentally threatens you? N   Is it safe for you to go home? Y       3 most recent PHQ Screens 6/19/2020   Little interest or pleasure in doing things Not at all   Feeling down, depressed, irritable, or hopeless Not at all   Total Score PHQ 2 0       There are no discontinued medications.

## 2020-12-23 NOTE — PROGRESS NOTES
This is the Subsequent Medicare Annual Wellness Exam, performed 12 months or more after the Initial AWV or the last Subsequent AWV    I have reviewed the patient's medical history in detail and updated the computerized patient record. Depression Risk Factor Screening:     3 most recent PHQ Screens 6/19/2020   Little interest or pleasure in doing things Not at all   Feeling down, depressed, irritable, or hopeless Not at all   Total Score PHQ 2 0       Alcohol Risk Screen    Do you average more than 1 drink per night or more than 7 drinks a week: No    In the past three months have you have had more than 4 drinks containing alcohol on one occasion: No        Functional Ability and Level of Safety:    Hearing: Hearing is good. Activities of Daily Living: The home contains: no safety equipment. Patient does total self care      Ambulation: with no difficulty     Fall Risk:  Fall Risk Assessment, last 12 mths 6/19/2020   Able to walk? Yes   Fall in past 12 months? No      Abuse Screen:  Patient is not abused       Cognitive Screening    Has your family/caregiver stated any concerns about your memory: no     Cognitive Screening: Normal - Verbal Fluency Test    Assessment/Plan   Education and counseling provided:  Are appropriate based on today's review and evaluation    Diagnoses and all orders for this visit:    1. Medicare annual wellness visit, subsequent    2. Screening for depression  -     DEPRESSION SCREEN ANNUAL    3. Screening for diabetes mellitus    4.  Screening for ischemic heart disease        Health Maintenance Due     Health Maintenance Due   Topic Date Due    DTaP/Tdap/Td series (1 - Tdap) 05/09/1956    Shingrix Vaccine Age 50> (1 of 2) 05/09/1985    GLAUCOMA SCREENING Q2Y  05/09/2000    Pneumococcal 65+ years (1 of 1 - PPSV23) 05/09/2000       Patient Care Team   Patient Care Team:  Navdeep De Los Santos MD as PCP - General (Internal Medicine)  Navdeep De Los Santos MD as PCP - REHABILITATION [facility] Provider  Ruel Small MD as Surgeon (General Surgery)  Veronica Pope MD (Orthopedic Surgery)    History     Patient Active Problem List   Diagnosis Code    HNP (herniated nucleus pulposus), lumbar M51.26    Constipation K59.00    Hyperkalemia E87.5    Allergic rhinitis J30.9    Herpes zoster dermatitis B02.8, L30.8    Meningitis, viral A87.9    Arthritis of knee, left M17.12    Sciatica of left side associated with disorder of lumbosacral spine M53.87    Spinal stenosis M48.00    Hypertension I10    Sciatica M54.30    Insomnia G47.00    Prostate cancer screening Z12.5    Osteoarthritis of spine with radiculopathy, lumbar region M47.26    Acute left-sided low back pain without sciatica M54.5    Right inguinal hernia K40.90    S/P lumbar laminectomy Z98.890     Past Medical History:   Diagnosis Date    Allergic rhinitis 12/15/2017    Arthritis of knee, left 12/15/2017    Aseptic meningitis     Chronic pain     chronic back pain    Colon polyps     Constipation 12/15/2017    Dysphagia 12/15/2017    Elevated blood pressure reading 12/15/2017    Fatigue 12/15/2017    Headache 12/15/2017    Hematuria 12/15/2017    Herpes zoster dermatitis 12/15/2017    Hyperkalemia 12/15/2017    Hypertension 12/15/2017    Insomnia 12/15/2017    Osteoarthritis 12/15/2017    Otitis externa 12/15/2017    Prostate cancer screening 12/15/2017    Right groin hernia     Sciatica 12/15/2017    Sciatica of left side associated with disorder of lumbosacral spine 12/15/2017    Shingles     Spinal stenosis 12/15/2017      Past Surgical History:   Procedure Laterality Date    COLONOSCOPY N/A 11/29/2018    COLONOSCOPY performed by Juliet Trevizo MD at [address]  11/11/2014         COLONOSCOPY,DIAGNOSTIC  11/29/2018         HX CATARACT REMOVAL Bilateral     with lens implants    HX COLONOSCOPY      HX HERNIA REPAIR Left     HX HERNIA REPAIR  06/05/2019    HX ORTHOPAEDIC  2008    rods placed in back    HX ORTHOPAEDIC  10/6/15    LEFT L5-S1 MICRODISCECTOMY     HX ORTHOPAEDIC  08/2018    glynn replacement, by Dr. Paty Cummins      steroid injection for back pain    HX TONSILLECTOMY      CT EGD INSERT GUIDE WIRE DILATOR PASSAGE ESOPHAGUS  10/17/2013         CT EGD TRANSORAL BIOPSY SINGLE/MULTIPLE  10/17/2013          Current Outpatient Medications   Medication Sig Dispense Refill    ALPRAZolam (XANAX) 0.5 mg tablet TAKE 1 TABLET BY MOUTH THREE TIMES A DAY AS NEEDED FOR ANXIETY 90 Tab 3    gabapentin (NEURONTIN) 400 mg capsule TAKE 1 CAPSULE BY MOUTH THREE TIMES A DAY 90 Cap 2    tiZANidine (ZANAFLEX) 4 mg tablet TAKE 1 TABLET BY MOUTH EVERY DAY EVERY NIGHT 30 Tab 17    amLODIPine (NORVASC) 5 mg tablet TAKE 1 TABLET BY MOUTH EVERY DAY 30 Tab 5    polyethylene glycol (MIRALAX) 17 gram/dose powder Take 17 g by mouth daily. (Patient taking differently: Take 17 g by mouth as needed.) 510 g 0    Wheat Dextrin (BENEFIBER CLEAR) 3 gram/3.5 gram pwpk Take 1 Each by mouth daily.  vit C/E/Zn/coppr/lutein/zeaxan (PRESERVISION AREDS 2 PO) Take 1 Tab by mouth two (2) times a day.  acetaminophen (TYLENOL) 325 mg tablet Take 650 mg by mouth every four (4) hours as needed for Pain.  pseudoephed/acetaminophen/cpm (DARLENE-SELTZER PLUS COLD PO) Take  by mouth.  ibuprofen (MOTRIN) 600 mg tablet Take 1 Tab by mouth three (3) times daily (with meals). 21 Tab 0    naloxone (NARCAN) 4 mg/actuation nasal spray 1 [place] by IntraNASal route as needed (respiratory depression). Give single spray into one nostril. Call 911. Give additional doses every 2 to 3 minutes alternating nostrils until assistance arrives using a new nasal spray with each dose, if patient does not respond or responds and then relapses.  1 Each 0     No Known Allergies    Family History   Problem Relation Age of Onset    Lung Disease Mother         lung cancer    Hypertension Mother    [facility] Heart Failure Mother     Hypertension Father     Heart Failure Father      Social History     Tobacco Use    Smoking status: Never Smoker    Smokeless tobacco: Never Used   Substance Use Topics    Alcohol use: Never     Frequency: Never           Mary Mauro is a 80 y.o. male and presents with Hypertension (6 javad fu)  . Subjective:  Mr. Skylar Anderson presents today for Medicare annual wellness review as well as follow-up of hypertension. He is doing well on his current medical regimen. He has a prescription for Xanax that he takes only on an as-needed basis for generalized anxiety. He remains on gabapentin for back pain. His blood pressure is well controlled on amlodipine 5 mg daily. He takes Benefiber periodically for constipation as needed. He denies shortness of breath, chest pain, palpitations, PND, orthopnea, or pedal edema.       Past Medical History:   Diagnosis Date    Allergic rhinitis 12/15/2017    Arthritis of knee, left 12/15/2017    Aseptic meningitis     Chronic pain     chronic back pain    Colon polyps     Constipation 12/15/2017    Dysphagia 12/15/2017    Elevated blood pressure reading 12/15/2017    Fatigue 12/15/2017    Headache 12/15/2017    Hematuria 12/15/2017    Herpes zoster dermatitis 12/15/2017    Hyperkalemia 12/15/2017    Hypertension 12/15/2017    Insomnia 12/15/2017    Osteoarthritis 12/15/2017    Otitis externa 12/15/2017    Prostate cancer screening 12/15/2017    Right groin hernia     Sciatica 12/15/2017    Sciatica of left side associated with disorder of lumbosacral spine 12/15/2017    Shingles     Spinal stenosis 12/15/2017     Past Surgical History:   Procedure Laterality Date    COLONOSCOPY N/A 11/29/2018    COLONOSCOPY performed by [facility] MD at [facility] ENDOSCOPY    COLONOSCOPY,DIAGNOSTIC  11/11/2014         COLONOSCOPY,DIAGNOSTIC  11/29/2018         HX CATARACT REMOVAL Bilateral     with lens implants    HX COLONOSCOPY      HX HERNIA REPAIR Left     HX HERNIA REPAIR  06/05/2019    HX ORTHOPAEDIC  2008    rods placed in back    HX ORTHOPAEDIC  10/6/15    LEFT L5-S1 MICRODISCECTOMY     HX ORTHOPAEDIC  08/2018    glynn replacement, by Dr. Miah Robles      steroid injection for back pain    HX TONSILLECTOMY      [place] EGD INSERT GUIDE WIRE DILATOR PASSAGE ESOPHAGUS  10/17/2013         [place] EGD TRANSORAL BIOPSY SINGLE/MULTIPLE  10/17/2013          No Known Allergies  Current Outpatient Medications   Medication Sig Dispense Refill    ALPRAZolam (XANAX) 0.5 mg tablet TAKE 1 TABLET BY MOUTH THREE TIMES A DAY AS NEEDED FOR ANXIETY 90 Tab 3    gabapentin (NEURONTIN) 400 mg capsule TAKE 1 CAPSULE BY MOUTH THREE TIMES A DAY 90 Cap 2    tiZANidine (ZANAFLEX) 4 mg tablet TAKE 1 TABLET BY MOUTH EVERY DAY EVERY NIGHT 30 Tab 17    amLODIPine (NORVASC) 5 mg tablet TAKE 1 TABLET BY MOUTH EVERY DAY 30 Tab 5    polyethylene glycol (MIRALAX) 17 gram/dose powder Take 17 g by mouth daily. (Patient taking differently: Take 17 g by mouth as needed.) 510 g 0    Wheat Dextrin (BENEFIBER CLEAR) 3 gram/3.5 gram pwpk Take 1 Each by mouth daily.  vit C/E/Zn/coppr/lutein/zeaxan (PRESERVISION AREDS 2 PO) Take 1 Tab by mouth two (2) times a day.  acetaminophen (TYLENOL) 325 mg tablet Take 650 mg by mouth every four (4) hours as needed for Pain.  pseudoephed/acetaminophen/cpm (DARLENE-SELTZER PLUS COLD PO) Take  by mouth.  ibuprofen (MOTRIN) 600 mg tablet Take 1 Tab by mouth three (3) times daily (with meals). 21 Tab 0    naloxone (NARCAN) 4 mg/actuation nasal spray 1 [place] by IntraNASal route as needed (respiratory depression). Give single spray into one nostril. Call 911. Give additional doses every 2 to 3 minutes alternating nostrils until assistance arrives using a new nasal spray with each dose, if patient does not respond or responds and then relapses.  1 Each 0     Social History     Socioeconomic History    Marital status:  Spouse name: Not on file    Number of children: Not on file    Years of education: Not on file    Highest education level: Not on file   Tobacco Use    Smoking status: Never Smoker    Smokeless tobacco: Never Used   Substance and Sexual Activity    Alcohol use: Never     Frequency: Never    Drug use: Never     Family History   Problem Relation Age of Onset    Lung Disease Mother         lung cancer    Hypertension Mother     Heart Failure Mother     Hypertension Father     Heart Failure Father        Review of Systems  Constitutional:  negative for fevers, chills, anorexia and weight loss  Eyes:    negative for visual disturbance and irritation  ENT:    negative for tinnitus,sore throat,nasal congestion,ear pains. hoarseness  Respiratory:     negative for cough, hemoptysis, dyspnea,wheezing  CV:    negative for chest pain, palpitations, lower extremity edema  GI:    negative for nausea, vomiting, diarrhea, abdominal pain,melena  Endo:               negative for polyuria,polydipsia,polyphagia,heat intolerance  Genitourinary : negative for frequency, dysuria and hematuria  Integumentary: negative for rash and pruritus  Hematologic:   negative for easy bruising and gum/nose bleeding  Musculoskel:  negative for myalgias, arthralgias, back pain, muscle weakness, joint pain  Neurological:   negative for headaches, dizziness, vertigo, memory problems and gait   Behavl/Psych:  negative for feelings of anxiety, depression, mood changes  ROS otherwise negative      Objective:  Visit Vitals  /68 (BP 1 Location: Left arm, BP Patient Position: Sitting)   Pulse 70   Temp 98.9 °F (37.2 °C)   Resp 14   Ht 5' 9\" (1.753 m)   Wt 186 lb (84.4 kg)   SpO2 98%   BMI 27.47 kg/m²     Physical Exam:   General appearance - alert, well appearing, and in no distress  Mental status - alert, oriented to person, place, and time  EYE-TRACY, EOMI, fundi normal, corneas normal, no foreign bodies  ENT-ENT exam normal, no neck nodes or sinus tenderness  Nose - normal and patent, no erythema, discharge or polyps  Mouth - mucous membranes moist, pharynx normal without lesions  Neck - supple, no significant adenopathy   Chest - clear to auscultation, no wheezes, rales or rhonchi, symmetric air entry   Heart - normal rate, regular rhythm, normal S1, S2, no murmurs, rubs, clicks or gallops   Abdomen - soft, nontender, nondistended, no masses or organomegaly  Lymph- no adenopathy palpable  Ext-peripheral pulses normal, no pedal edema, no clubbing or cyanosis  Skin-Warm and dry. no hyperpigmentation, vitiligo, or suspicious lesions  Neuro -alert, oriented, normal speech, no focal findings or movement disorder noted      Assessment/Plan:  Diagnoses and all orders for this visit:    1. Medicare annual wellness visit, subsequent    2. Screening for depression  -     DEPRESSION SCREEN ANNUAL    3. Screening for diabetes mellitus    4. Screening for ischemic heart disease          ICD-10-CM ICD-9-CM    1. Medicare annual wellness visit, subsequent  Z00.00 V70.0    2. Screening for depression  Z13.31 V79.0 DEPRESSION SCREEN ANNUAL   3. Screening for diabetes mellitus  Z13.1 V77.1    4. Screening for ischemic heart disease  Z13.6 V81.0        Plan:    Problems as outlined above currently stable. Continue current medical regimen. We have reviewed the labs that were done prior to this office visit with the patient today. I have reviewed with the patient details of the assessment and plan and all questions were answered. Relevent patient education was performed. Verbal and/or written instructions (see AVS) provided. The most recent lab findings were reviewed with the patient. Plan was discussed with patient who verbally expressed understanding. An After Visit Summary was printed and given to the patient.     Yobani Carrillo MD

## 2021-02-21 DIAGNOSIS — M48.061 SPINAL STENOSIS OF LUMBAR REGION, UNSPECIFIED WHETHER NEUROGENIC CLAUDICATION PRESENT: ICD-10-CM

## 2021-02-22 RX ORDER — GABAPENTIN 400 MG/1
CAPSULE ORAL
Qty: 90 CAP | Refills: 0 | Status: SHIPPED | OUTPATIENT
Start: 2021-02-22 | End: 2021-03-25 | Stop reason: SDUPTHER

## 2021-03-25 DIAGNOSIS — M48.061 SPINAL STENOSIS OF LUMBAR REGION, UNSPECIFIED WHETHER NEUROGENIC CLAUDICATION PRESENT: ICD-10-CM

## 2021-03-25 NOTE — TELEPHONE ENCOUNTER
Last Refill: 2/22/21  Last Visit: 12/23/2020   Next Visit: 6/23/2021    Requested Prescriptions     Pending Prescriptions Disp Refills    gabapentin (NEURONTIN) 400 mg capsule 90 Cap 0

## 2021-03-29 RX ORDER — GABAPENTIN 400 MG/1
CAPSULE ORAL
Qty: 90 CAP | Refills: 2 | Status: SHIPPED | OUTPATIENT
Start: 2021-03-29 | End: 2021-06-23

## 2021-04-19 RX ORDER — AMLODIPINE BESYLATE 5 MG/1
TABLET ORAL
Qty: 30 TAB | Refills: 2 | Status: SHIPPED | OUTPATIENT
Start: 2021-04-19 | End: 2021-07-17

## 2021-05-01 ENCOUNTER — IMMUNIZATION (OUTPATIENT)
Dept: FAMILY MEDICINE CLINIC | Age: 86
End: 2021-05-01
Payer: MEDICARE

## 2021-05-01 DIAGNOSIS — Z23 ENCOUNTER FOR IMMUNIZATION: Primary | ICD-10-CM

## 2021-05-01 PROCEDURE — 91300 COVID-19, MRNA, LNP-S, PF, 30MCG/0.3ML DOSE(PFIZER): CPT

## 2021-05-21 ENCOUNTER — OFFICE VISIT (OUTPATIENT)
Dept: INTERNAL MEDICINE CLINIC | Age: 86
End: 2021-05-21
Payer: MEDICARE

## 2021-05-21 VITALS
BODY MASS INDEX: 27.11 KG/M2 | HEIGHT: 69 IN | RESPIRATION RATE: 18 BRPM | SYSTOLIC BLOOD PRESSURE: 168 MMHG | DIASTOLIC BLOOD PRESSURE: 72 MMHG | WEIGHT: 183 LBS | OXYGEN SATURATION: 97 % | TEMPERATURE: 97.7 F | HEART RATE: 63 BPM

## 2021-05-21 DIAGNOSIS — L82.1 SK (SEBORRHEIC KERATOSIS): ICD-10-CM

## 2021-05-21 DIAGNOSIS — R51.9 RIGHT SIDED TEMPORAL HEADACHE: Primary | ICD-10-CM

## 2021-05-21 LAB — ERYTHROCYTE [SEDIMENTATION RATE] IN BLOOD: 7 MM/HR (ref 0–20)

## 2021-05-21 PROCEDURE — 99214 OFFICE O/P EST MOD 30 MIN: CPT | Performed by: INTERNAL MEDICINE

## 2021-05-21 PROCEDURE — G8510 SCR DEP NEG, NO PLAN REQD: HCPCS | Performed by: INTERNAL MEDICINE

## 2021-05-21 PROCEDURE — G8536 NO DOC ELDER MAL SCRN: HCPCS | Performed by: INTERNAL MEDICINE

## 2021-05-21 PROCEDURE — 1101F PT FALLS ASSESS-DOCD LE1/YR: CPT | Performed by: INTERNAL MEDICINE

## 2021-05-21 PROCEDURE — G8419 CALC BMI OUT NRM PARAM NOF/U: HCPCS | Performed by: INTERNAL MEDICINE

## 2021-05-21 PROCEDURE — G8427 DOCREV CUR MEDS BY ELIG CLIN: HCPCS | Performed by: INTERNAL MEDICINE

## 2021-05-21 NOTE — PROGRESS NOTES
1. Have you been to the ER, urgent care clinic since your last visit? Hospitalized since your last visit? No    2. Have you seen or consulted any other health care providers outside of the [address] [place] [address] since your last visit? Include any pap smears or colon screening.  No Continue Regimen: Wart compound cream 5g qhs Render In Strict Bullet Format?: No Detail Level: Zone

## 2021-05-21 NOTE — PROGRESS NOTES
This note will not be viewable in [address] Candi Villalpando is a 80 y.o. male and presents with Head Injury (pt states he has a spot on the L side of his head and he is unsure what caused it. pt states it may have been a tick but he is unsure.)  . Subjective:  Mr. Katherine Macias presents today with complaint of a spot on the left side of his head in the temporal area. He was concerned that it may be a tick bite. This is been present for the past couple of weeks. He has been out in the yard doing a lot of yard work. He also incidentally notes that he has had fleeting right temporal headache pain that comes and goes. It usually occurs once and then will be gone for several weeks. He does not have any associated symptoms with this. He does not have any acute visual changes noted with this.     Past Medical History:   Diagnosis Date    Allergic rhinitis 12/15/2017    Arthritis of knee, left 12/15/2017    Aseptic meningitis     Chronic pain     chronic back pain    Colon polyps     Constipation 12/15/2017    Dysphagia 12/15/2017    Elevated blood pressure reading 12/15/2017    Fatigue 12/15/2017    Headache 12/15/2017    Hematuria 12/15/2017    Herpes zoster dermatitis 12/15/2017    Hyperkalemia 12/15/2017    Hypertension 12/15/2017    Insomnia 12/15/2017    Osteoarthritis 12/15/2017    Otitis externa 12/15/2017    Prostate cancer screening 12/15/2017    Right groin hernia     Sciatica 12/15/2017    Sciatica of left side associated with disorder of lumbosacral spine 12/15/2017    Shingles     Spinal stenosis 12/15/2017     Past Surgical History:   Procedure Laterality Date    COLONOSCOPY N/A 11/29/2018    [facility]COPY    COLONOSCOPY,DIAGNOSTIC  11/11/2014         COLONOSCOPY,DIAGNOSTIC  11/29/2018         HX CATARACT REMOVAL Bilateral     with lens implants    HX COLONOSCOPY      HX HERNIA REPAIR Left     HX HERNIA REPAIR  06/05/2019    HX ORTHOPAEDIC  2008    rods placed in back    HX ORTHOPAEDIC  10/6/15    LEFT L5-S1 MICRODISCECTOMY     HX ORTHOPAEDIC  08/2018    glynn replacement, by Dr. Argelia Escobar      steroid injection for back pain    HX TONSILLECTOMY      [place] EGD INSERT GUIDE WIRE DILATOR PASSAGE ESOPHAGUS  10/17/2013         [place] EGD TRANSORAL BIOPSY SINGLE/MULTIPLE  10/17/2013          No Known Allergies  Current Outpatient Medications   Medication Sig Dispense Refill    amLODIPine (NORVASC) 5 mg tablet TAKE 1 TABLET BY MOUTH EVERY DAY 30 Tab 2    gabapentin (NEURONTIN) 400 mg capsule TAKE 1 CAPSULE BY MOUTH THREE TIMES A DAY 90 Cap 2    ALPRAZolam (XANAX) 0.5 mg tablet TAKE 1 TABLET BY MOUTH THREE TIMES A DAY AS NEEDED FOR ANXIETY 90 Tab 3    tiZANidine (ZANAFLEX) 4 mg tablet TAKE 1 TABLET BY MOUTH EVERY DAY EVERY NIGHT 30 Tab 17    polyethylene glycol (MIRALAX) 17 gram/dose powder Take 17 g by mouth daily. (Patient taking differently: Take 17 g by mouth as needed.) 510 g 0    Wheat Dextrin (BENEFIBER CLEAR) 3 gram/3.5 gram pwpk Take 1 Each by mouth daily.  vit C/E/Zn/coppr/lutein/zeaxan (PRESERVISION AREDS 2 PO) Take 1 Tab by mouth two (2) times a day.  acetaminophen (TYLENOL) 325 mg tablet Take 650 mg by mouth every four (4) hours as needed for Pain.        Social History     Socioeconomic History    Marital status:      Spouse name: Not on file    Number of children: Not on file    Years of education: Not on file    Highest education level: Not on file   Tobacco Use    Smoking status: Never Smoker    Smokeless tobacco: Never Used   Vaping Use    Vaping Use: Never used   Substance and Sexual Activity    Alcohol use: Never    Drug use: Never     Social Determinants of Health     Financial Resource Strain:     Difficulty of Paying Living Expenses:    Food Insecurity:     Worried About Running Out of Food in the Last Year:     [address] in the Last Year:    Transportation Needs:     Lack of Transportation (Medical):  Lack of Transportation (Non-Medical):    Physical Activity:     Days of Exercise per Week:     Minutes of Exercise per Session:    Stress:     Feeling of Stress :    Social Connections:     Frequency of Communication with Friends and Family:     Frequency of Social Gatherings with Friends and Family:     Attends Jewish Services:     Active Member of Clubs or Organizations:     Attends Club or Organization Meetings:     Marital Status:      Family History   Problem Relation Age of Onset    Lung Disease Mother         lung cancer    Hypertension Mother     Heart Failure Mother     Hypertension Father     Heart Failure Father        Review of Systems  Constitutional:  negative for fevers, chills, anorexia and weight loss  Eyes:    negative for visual disturbance and irritation  ENT:    negative for tinnitus,sore throat,nasal congestion,ear pains. hoarseness  Respiratory:     negative for cough, hemoptysis, dyspnea,wheezing  CV:    negative for chest pain, palpitations, lower extremity edema  GI:    negative for nausea, vomiting, diarrhea, abdominal pain,melena  Endo:               negative for polyuria,polydipsia,polyphagia,heat intolerance  Genitourinary : negative for frequency, dysuria and hematuria  Integumentary: negative for rash and pruritus  Hematologic:   negative for easy bruising and gum/nose bleeding  Musculoskel:  negative for myalgias, arthralgias, back pain, muscle weakness, joint pain  Neurological:   negative for headaches, dizziness, vertigo, memory problems and gait   Behavl/Psych:  negative for feelings of anxiety, depression, mood changes  ROS otherwise negative      Objective:  Visit Vitals  BP (!) 168/72 (BP 1 Location: Left arm, BP Patient Position: Sitting, BP Cuff Size: Large adult)   Pulse 63   Temp 97.7 °F (36.5 °C) (Oral)   Resp 18   Ht 5' 9\" (1.753 m)   Wt 183 lb (83 kg)   SpO2 97%   BMI 27.02 kg/m²     Physical Exam:   General appearance - alert, well appearing, and in no distress  Mental status - alert, oriented to person, place, and time  EYE-TRACY, EOMI, fundi normal, corneas normal, no foreign bodies  ENT-, no neck nodes or sinus tenderness, right temporal artery nontender to palpation  Nose - normal and patent, no erythema, discharge or polyps  Mouth - mucous membranes moist, pharynx normal without lesions  Neck - supple, no significant adenopathy   Chest - clear to auscultation, no wheezes, rales or rhonchi, symmetric air entry   Heart - normal rate, regular rhythm, normal S1, S2, no murmurs, rubs, clicks or gallops   Abdomen - soft, nontender, nondistended, no masses or organomegaly  Lymph- no adenopathy palpable  Ext-peripheral pulses normal, no pedal edema, no clubbing or cyanosis  Skin-multiple SKs all about the trunk torso arms, inflamed SK left temporal region treated with cryotherapy today without complication  Neuro -alert, oriented, normal speech, no focal findings or movement disorder noted      Assessment/Plan:  Diagnoses and all orders for this visit:    1. Right sided temporal headache  -     SED RATE (ESR); Future    2. SK (seborrheic keratosis)          ICD-10-CM ICD-9-CM    1. Right sided temporal headache  R51.9 784.0 SED RATE (ESR)   2. SK (seborrheic keratosis)  L82.1 702.19        Plan:    Follow-up sed rate to rule out temporal arteritis. Left inflamed temporal SK treated with cryotherapy without difficulty. I have reviewed with the patient details of the assessment and plan and all questions were answered. Relevent patient education was performed. Verbal and/or written instructions (see AVS) provided. The most recent lab findings were reviewed with the patient. Plan was discussed with patient who verbally expressed understanding. An After Visit Summary was printed and given to the patient.     Clint Power MD

## 2021-05-22 ENCOUNTER — IMMUNIZATION (OUTPATIENT)
Dept: FAMILY MEDICINE CLINIC | Age: 86
End: 2021-05-22
Payer: MEDICARE

## 2021-05-22 DIAGNOSIS — Z23 ENCOUNTER FOR IMMUNIZATION: Primary | ICD-10-CM

## 2021-05-22 PROCEDURE — 91300 COVID-19, MRNA, LNP-S, PF, 30MCG/0.3ML DOSE(PFIZER): CPT

## 2021-06-23 ENCOUNTER — OFFICE VISIT (OUTPATIENT)
Dept: INTERNAL MEDICINE CLINIC | Age: 86
End: 2021-06-23
Payer: MEDICARE

## 2021-06-23 VITALS
WEIGHT: 182.2 LBS | RESPIRATION RATE: 18 BRPM | BODY MASS INDEX: 26.98 KG/M2 | SYSTOLIC BLOOD PRESSURE: 134 MMHG | OXYGEN SATURATION: 98 % | HEART RATE: 73 BPM | DIASTOLIC BLOOD PRESSURE: 70 MMHG | HEIGHT: 69 IN

## 2021-06-23 DIAGNOSIS — M47.26 OSTEOARTHRITIS OF SPINE WITH RADICULOPATHY, LUMBAR REGION: ICD-10-CM

## 2021-06-23 DIAGNOSIS — I10 ESSENTIAL HYPERTENSION: Primary | ICD-10-CM

## 2021-06-23 DIAGNOSIS — M48.061 SPINAL STENOSIS OF LUMBAR REGION, UNSPECIFIED WHETHER NEUROGENIC CLAUDICATION PRESENT: ICD-10-CM

## 2021-06-23 DIAGNOSIS — F41.9 ANXIETY: ICD-10-CM

## 2021-06-23 PROCEDURE — G8427 DOCREV CUR MEDS BY ELIG CLIN: HCPCS | Performed by: INTERNAL MEDICINE

## 2021-06-23 PROCEDURE — G8536 NO DOC ELDER MAL SCRN: HCPCS | Performed by: INTERNAL MEDICINE

## 2021-06-23 PROCEDURE — 1101F PT FALLS ASSESS-DOCD LE1/YR: CPT | Performed by: INTERNAL MEDICINE

## 2021-06-23 PROCEDURE — G8419 CALC BMI OUT NRM PARAM NOF/U: HCPCS | Performed by: INTERNAL MEDICINE

## 2021-06-23 PROCEDURE — 99214 OFFICE O/P EST MOD 30 MIN: CPT | Performed by: INTERNAL MEDICINE

## 2021-06-23 PROCEDURE — G8432 DEP SCR NOT DOC, RNG: HCPCS | Performed by: INTERNAL MEDICINE

## 2021-06-23 RX ORDER — GABAPENTIN 400 MG/1
CAPSULE ORAL
Qty: 90 CAPSULE | Refills: 1 | Status: SHIPPED | OUTPATIENT
Start: 2021-06-23 | End: 2021-09-13

## 2021-06-23 RX ORDER — ALPRAZOLAM 0.5 MG/1
TABLET ORAL
Qty: 90 TABLET | Refills: 0 | Status: SHIPPED | OUTPATIENT
Start: 2021-06-23 | End: 2021-09-13

## 2021-06-23 NOTE — PROGRESS NOTES
1. Have you been to the ER, urgent care clinic since your last visit? Hospitalized since your last visit? No    2. Have you seen or consulted any other health care providers outside of the [address] [place] [address] since your last visit? Include any pap smears or colon screening.  No

## 2021-06-23 NOTE — PROGRESS NOTES
This note will not be viewable in [address] Brisa Santiago is a 80 y.o. male and presents with Follow-up (6 month routine f/u)  . Subjective:  Mr. 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 He remains on gabapentin 400 mg daily. He takes alprazolam as needed. He takes amlodipine 5 mg daily. He has no shortness of breath, chest pain, palpitations, PND, orthopnea, or pedal edema. He does wear hearing aids and complains that he does not think that his hearing aids are working as well as they had previously. He and his wife are about to celebrate their 70th wedding anniversary.     Past Medical History:   Diagnosis Date    Allergic rhinitis 12/15/2017    Arthritis of knee, left 12/15/2017    Aseptic meningitis     Chronic pain     chronic back pain    Colon polyps     Constipation 12/15/2017    Dysphagia 12/15/2017    Elevated blood pressure reading 12/15/2017    Fatigue 12/15/2017    Headache 12/15/2017    Hematuria 12/15/2017    Herpes zoster dermatitis 12/15/2017    Hyperkalemia 12/15/2017    Hypertension 12/15/2017    Insomnia 12/15/2017    Osteoarthritis 12/15/2017    Otitis externa 12/15/2017    Prostate cancer screening 12/15/2017    Right groin hernia     Sciatica 12/15/2017    Sciatica of left side associated with disorder of lumbosacral spine 12/15/2017    Shingles     Spinal stenosis 12/15/2017     Past Surgical History:   Procedure Laterality Date    COLONOSCOPY N/A 11/29/2018    COLONOSCOPY performed by Jayson Mtz MD at [facility]  11/11/2014         COLONOSCOPY,DIAGNOSTIC  11/29/2018         HX CATARACT REMOVAL Bilateral     with lens implants    HX COLONOSCOPY      HX HERNIA REPAIR Left     HX HERNIA REPAIR  06/05/2019    HX ORTHOPAEDIC  2008    rods placed in back    HX ORTHOPAEDIC  10/6/15    LEFT L5-S1 MICRODISCECTOMY     HX ORTHOPAEDIC  08/2018    glynn replacement, by Dr. Joshua Mckeon      steroid injection for back pain    HX TONSILLECTOMY      CO EGD INSERT GUIDE WIRE DILATOR PASSAGE ESOPHAGUS  10/17/2013         CO EGD TRANSORAL BIOPSY SINGLE/MULTIPLE  10/17/2013          No Known Allergies  Current Outpatient Medications   Medication Sig Dispense Refill    gabapentin (NEURONTIN) 400 mg capsule TAKE 1 CAPSULE BY MOUTH THREE TIMES A DAY 90 Capsule 1    ALPRAZolam (XANAX) 0.5 mg tablet TAKE 1 TABLET BY MOUTH THREE TIMES A DAY AS NEEDED FOR ANXIETY 90 Tablet 0    amLODIPine (NORVASC) 5 mg tablet TAKE 1 TABLET BY MOUTH EVERY DAY 30 Tab 2    tiZANidine (ZANAFLEX) 4 mg tablet TAKE 1 TABLET BY MOUTH EVERY DAY EVERY NIGHT 30 Tab 17    polyethylene glycol (MIRALAX) 17 gram/dose powder Take 17 g by mouth daily. (Patient taking differently: Take 17 g by mouth as needed.) 510 g 0    Wheat Dextrin (BENEFIBER CLEAR) 3 gram/3.5 gram pwpk Take 1 Each by mouth daily.  vit C/E/Zn/coppr/lutein/zeaxan (PRESERVISION AREDS 2 PO) Take 1 Tab by mouth two (2) times a day.  acetaminophen (TYLENOL) 325 mg tablet Take 650 mg by mouth every four (4) hours as needed for Pain. Social History     Socioeconomic History    Marital status:      Spouse name: Not on file    Number of children: Not on file    Years of education: Not on file    Highest education level: Not on file   Tobacco Use    Smoking status: Never Smoker    Smokeless tobacco: Never Used   Vaping Use    Vaping Use: Never used   Substance and Sexual Activity    Alcohol use: Never    Drug use: Never     Social Determinants of Health     Financial Resource Strain:     Difficulty of Paying Living Expenses:    Food Insecurity:     Worried About Running Out of Food in the Last Year:     [address] in the Last Year:    Transportation Needs:     Lack of Transportation (Medical):      Lack of Transportation (Non-Medical): Physical Activity:     Days of Exercise per Week:     Minutes of Exercise per Session:    Stress:     Feeling of Stress :    Social Connections:     Frequency of Communication with Friends and Family:     Frequency of Social Gatherings with Friends and Family:     Attends [employer] Services:     Active Member of Clubs or Organizations:     Attends Club or Organization Meetings:     Marital Status:      Family History   Problem Relation Age of Onset    Lung Disease Mother         lung cancer    Hypertension Mother     Heart Failure Mother     Hypertension Father     Heart Failure Father        Review of Systems  Constitutional:  negative for fevers, chills, anorexia and weight loss  Eyes:    negative for visual disturbance and irritation  ENT:    negative for tinnitus,sore throat,nasal congestion,ear pains. hoarseness  Respiratory:     negative for cough, hemoptysis, dyspnea,wheezing  CV:    negative for chest pain, palpitations, lower extremity edema  GI:    negative for nausea, vomiting, diarrhea, abdominal pain,melena  Endo:               negative for polyuria,polydipsia,polyphagia,heat intolerance  Genitourinary : negative for frequency, dysuria and hematuria  Integumentary: negative for rash and pruritus  Hematologic:   negative for easy bruising and gum/nose bleeding  Musculoskel:  negative for myalgias, arthralgias,  muscle weakness, joint pain  Neurological:   negative for headaches, dizziness, vertigo, memory problems and gait   Behavl/Psych:  negative for feelings of anxiety, depression, mood changes  ROS otherwise negative      Objective:  Visit Vitals  /70 (BP 1 Location: Left arm, BP Patient Position: Sitting, BP Cuff Size: Large adult)   Pulse 73   Resp 18   Ht 5' 9\" (1.753 m)   Wt 182 lb 3.2 oz (82.6 kg)   SpO2 98%   BMI 26.91 kg/m²     Physical Exam:   General appearance - alert, well appearing, and in no distress  Mental status - alert, oriented to person, place, and time  EYE-TRACY, EOMI, fundi normal, corneas normal, no foreign bodies  ENT-ENT exam normal, no neck nodes or sinus tenderness  Nose - normal and patent, no erythema, discharge or polyps  Mouth - mucous membranes moist, pharynx normal without lesions  Neck - supple, no significant adenopathy   Chest - clear to auscultation, no wheezes, rales or rhonchi, symmetric air entry   Heart - normal rate, regular rhythm, normal S1, S2, no murmurs, rubs, clicks or gallops   Abdomen - soft, nontender, nondistended, no masses or organomegaly  Lymph- no adenopathy palpable  Ext-peripheral pulses normal, no pedal edema, no clubbing or cyanosis  Skin-Warm and dry. no hyperpigmentation, vitiligo, or suspicious lesions  Neuro -alert, oriented, normal speech, no focal findings or movement disorder noted      Assessment/Plan:  Diagnoses and all orders for this visit:    1. Essential hypertension    2. Osteoarthritis of spine with radiculopathy, lumbar region    3. Anxiety          ICD-10-CM ICD-9-CM    1. Essential hypertension  I10 401.9    2. Osteoarthritis of spine with radiculopathy, lumbar region  M47.26 721.3    3. Anxiety  F41.9 300.00      Plan:    Problems as outlined above currently stable. We reviewed the patient's labs from 6 months prior which are stable. He will follow-up in 6 months for a routine Medicare wellness review. Follow-up and Dispositions    · Return in about 6 months (around 12/23/2021) for follow up, [facility] have reviewed with the patient details of the assessment and plan and all questions were answered. Relevent patient education was performed. Verbal and/or written instructions (see AVS) provided. The most recent lab findings were reviewed with the patient. Plan was discussed with patient who verbally expressed understanding. An After Visit Summary was printed and given to the patient.     Alize Mane MD

## 2021-06-24 NOTE — PROGRESS NOTES
[facility] [address] called needing office notes to why pt needed the CT scan. I faxed over the last office notes.   Told them to call if they needed anything else This note will not be viewable in [address] Montserrat Sigala is a 80 y.o. male and presents with Abdominal Pain (pain after colonoscopy) and Back Pain (left side of back between ribs & hip )  . Subjective:  Mr. Lucila Guerrier presents today with a couple of new complaints. He had surgery for spinal stenosis by Dr. Chanda Dyson last month and had been doing well until just a couple of days ago when he developed some sharp pain in his left lower back. He does not note any significant trauma or injury. The pain has largely subsided at this point but he did not want to not come in for his appointment in case it reoccurred. He does have a muscle relaxer that is been prescribed for him previously. He also notes abdominal distention or bloating and intermittent gas. He states this is been a problem since he had his colonoscopy a couple of weeks ago. He states the colonoscopy was unremarkable except for some diverticulosis. He tolerated the bowel prep well without difficulty. He is not had any melena or hematochezia. He does not note exacerbation of his symptoms with certain foods. He does take MiraLAX and Benefiber on a regular basis. Review of Systems  Constitutional:   Eyes:   negative for visual disturbance and irritation  ENT:   negative for tinnitus,sore throat,nasal congestion,ear pains. hoarseness  Respiratory:  negative for cough, hemoptysis, dyspnea,wheezing  CV:   negative for chest pain, palpitations, lower extremity edema  GI:   negative for nausea, vomiting, diarrhea, abdominal pain,melena  Endo:               negative for polyuria,polydipsia,polyphagia,heat intolerance  Genitourinary: negative for frequency, dysuria and hematuria  Integumentary: negative for rash and pruritus  Hematologic:  negative for easy bruising and gum/nose bleeding  Musculoskel: negative for myalgias, arthralgias, muscle weakness, joint pain  Neurological:  negative for headaches, dizziness, vertigo, memory problems and gait   Behavl/Psych: negative for feelings of anxiety, depression, mood changes    Past Medical History:   Diagnosis Date    Allergic rhinitis 12/15/2017    Arthritis of knee, left 12/15/2017    Aseptic meningitis     Chronic pain     chronic back pain    Colon polyps     Constipation 12/15/2017    Dysphagia 12/15/2017    Elevated blood pressure reading 12/15/2017    Fatigue 12/15/2017    Headache 12/15/2017    Hematuria 12/15/2017    Herpes zoster dermatitis 12/15/2017    Hyperkalemia 12/15/2017    Hypertension 12/15/2017    Insomnia 12/15/2017    Osteoarthritis 12/15/2017    Otitis externa 12/15/2017    Prostate cancer screening 12/15/2017    Right groin hernia     Sciatica 12/15/2017    Sciatica of left side associated with disorder of lumbosacral spine 12/15/2017    Shingles     Spinal stenosis 12/15/2017     Past Surgical History:   Procedure Laterality Date    COLONOSCOPY N/A 11/29/2018    [facility]  11/11/2014         COLONOSCOPY,DIAGNOSTIC  11/29/2018         HX CATARACT REMOVAL Bilateral     with lens implants    HX COLONOSCOPY      HX HERNIA REPAIR Left     HX ORTHOPAEDIC  2008    rods placed in back    HX ORTHOPAEDIC  10/6/15    LEFT L5-S1 MICRODISCECTOMY     HX ORTHOPAEDIC  08/2018    glynn replacement, by Dr. Amanda Mckenna      steroid injection for back pain    HX TONSILLECTOMY      TX EGD INSERT GUIDE WIRE DILATOR PASSAGE ESOPHAGUS  10/17/2013         TX EGD TRANSORAL BIOPSY SINGLE/MULTIPLE  10/17/2013          Social History     Socioeconomic History    Marital status:      Spouse name: Not on file    Number of children: Not on file    Years of education: Not on file    Highest education level: Not on file   Tobacco Use    Smoking status: Never Smoker    Smokeless tobacco: Never Used   Substance and Sexual Activity    Alcohol use: No    Drug use: No     Family History Problem Relation Age of Onset    Lung Disease Mother         lung cancer    Hypertension Mother     Heart Failure Mother     Hypertension Father     Heart Failure Father      Current Outpatient Medications   Medication Sig Dispense Refill    Wheat Dextrin (BENEFIBER CLEAR) 3 gram/3.5 gram pwpk Take 1 Each by mouth daily.  polyethylene glycol (MIRALAX) 17 gram packet Take 17 g by mouth daily.  tiZANidine (ZANAFLEX) 4 mg tablet Take 1 Tab by mouth nightly. 90 Tab 3    amLODIPine (NORVASC) 5 mg tablet Take 1 Tab by mouth daily. 90 Tab 3    naloxone (NARCAN) 4 mg/actuation nasal spray 1 [place] by IntraNASal route as needed (respiratory depression). Give single spray into one nostril. Call 911. Give additional doses every 2 to 3 minutes alternating nostrils until assistance arrives using a new nasal spray with each dose, if patient does not respond or responds and then relapses. 1 Each 0    vit C/E/Zn/coppr/lutein/zeaxan (PRESERVISION AREDS 2 PO) Take 1 Tab by mouth daily.  gabapentin (NEURONTIN) 300 mg capsule Take 300 mg by mouth three (3) times daily.  ALPRAZolam (XANAX) 0.5 mg tablet TAKE 1 TABLET BY MOUTH 3 TIMES DAILY AS NEEDED FOR ANXIETY 90 Tab 0    acetaminophen (TYLENOL) 325 mg tablet Take 650 mg by mouth every four (4) hours as needed for Pain.        No Known Allergies    Objective:  Visit Vitals  /72 (BP 1 Location: Left arm, BP Patient Position: Sitting)   Pulse 79   Temp 97.6 °F (36.4 °C) (Oral)   Resp 20   Ht 5' 9\" (1.753 m)   Wt 187 lb 6.4 oz (85 kg)   SpO2 98%   BMI 27.67 kg/m²     Physical Exam:   General appearance - alert, well appearing, and in no distress  Mental status - alert, oriented to person, place, and time  EYE-TRACY, EOMI, fundi normal, corneas normal, no foreign bodies  ENT-ENT exam normal, no neck nodes or sinus tenderness  Nose - normal and patent, no erythema, discharge or polyps  Mouth - mucous membranes moist, pharynx normal without lesions  Neck - supple, no significant adenopathy   Chest - clear to auscultation, no wheezes, rales or rhonchi, symmetric air entry   Heart - normal rate, regular rhythm, normal S1, S2, no murmurs, rubs, clicks or gallops   Abdomen - soft, nontender, nondistended, no masses or organomegaly  Lymph- no adenopathy palpable  Ext-peripheral pulses normal, no pedal edema, no clubbing or cyanosis  Skin-Warm and dry. no hyperpigmentation, vitiligo, or suspicious lesions  Neuro -alert, oriented, normal speech, no focal findings or movement disorder noted  Musculoskeletal- FROM, no bony abnormalities, minimal paraspinal muscle tenderness on the left in the lumbar area    No results found for this visit on 12/13/18. All results for lab orders may not have been returned by the time this encountered was closed. Assessment/Plan:       ICD-10-CM ICD-9-CM    1. Essential hypertension M28 781.2 METABOLIC PANEL, COMPREHENSIVE      URINALYSIS W/ RFLX MICROSCOPIC   2. Spinal stenosis of lumbar region, unspecified whether neurogenic claudication present M48.061 724.02    3. Constipation, unspecified constipation type K59.00 564.00    4. Pure hypercholesterolemia E78.00 272.0 LIPID PANEL   5. Fatigue, unspecified type R53.83 780.79 CBC W/O DIFF       Orders Placed This Encounter    CBC W/O DIFF     Standing Status:   Future     Standing Expiration Date:   12/20/2018    LIPID PANEL     Standing Status:   Future     Standing Expiration Date:   53/08/0641    METABOLIC PANEL, COMPREHENSIVE     Standing Status:   Future     Standing Expiration Date:   12/20/2018    URINALYSIS W/ RFLX MICROSCOPIC     Standing Status:   Future     Standing Expiration Date:   12/20/2018     Plan:    Patient will have his follow-up labs as ordered before his next office visit which was scheduled for next week. I have asked him to hold his MiraLAX for a few days any.   Since his back is significantly better we will not intervene and do any further workup or medication. Follow-up Disposition:  Return for as scheduled. I have reviewed with the patient details of the assessment and plan and all questions were answered. Relevent patient education was performed. Verbal and/or written instructions (see AVS) provided. The most recent lab findings were reviewed with the patient. Plan was discussed with patient who verbal expressed understanding. An After Visit Summary was printed and given to the patient.       Colton Flores MD

## 2021-09-10 DIAGNOSIS — M48.061 SPINAL STENOSIS OF LUMBAR REGION, UNSPECIFIED WHETHER NEUROGENIC CLAUDICATION PRESENT: ICD-10-CM

## 2021-09-13 DIAGNOSIS — F41.9 ANXIETY: ICD-10-CM

## 2021-09-13 RX ORDER — GABAPENTIN 400 MG/1
CAPSULE ORAL
Qty: 90 CAPSULE | Refills: 1 | Status: SHIPPED | OUTPATIENT
Start: 2021-09-13 | End: 2021-11-10

## 2021-09-13 RX ORDER — ALPRAZOLAM 0.5 MG/1
TABLET ORAL
Qty: 90 TABLET | Refills: 0 | Status: SHIPPED | OUTPATIENT
Start: 2021-09-13 | End: 2021-12-10

## 2021-11-10 DIAGNOSIS — M48.061 SPINAL STENOSIS OF LUMBAR REGION, UNSPECIFIED WHETHER NEUROGENIC CLAUDICATION PRESENT: ICD-10-CM

## 2021-11-10 RX ORDER — GABAPENTIN 400 MG/1
CAPSULE ORAL
Qty: 90 CAPSULE | Refills: 1 | Status: SHIPPED | OUTPATIENT
Start: 2021-11-10 | End: 2022-01-06

## 2021-11-10 RX ORDER — TIZANIDINE 4 MG/1
TABLET ORAL
Qty: 30 TABLET | Refills: 11 | Status: SHIPPED | OUTPATIENT
Start: 2021-11-10

## 2021-12-10 DIAGNOSIS — F41.9 ANXIETY: ICD-10-CM

## 2021-12-10 RX ORDER — ALPRAZOLAM 0.5 MG/1
TABLET ORAL
Qty: 90 TABLET | Refills: 0 | Status: SHIPPED | OUTPATIENT
Start: 2021-12-10 | End: 2022-02-10 | Stop reason: SDUPTHER

## 2021-12-23 ENCOUNTER — LAB ONLY (OUTPATIENT)
Dept: INTERNAL MEDICINE CLINIC | Age: 86
End: 2021-12-23

## 2021-12-23 DIAGNOSIS — I10 PRIMARY HYPERTENSION: Primary | ICD-10-CM

## 2021-12-23 DIAGNOSIS — Z13.6 SCREENING FOR ISCHEMIC HEART DISEASE: ICD-10-CM

## 2021-12-23 DIAGNOSIS — R53.83 FATIGUE, UNSPECIFIED TYPE: ICD-10-CM

## 2021-12-23 DIAGNOSIS — M47.26 OSTEOARTHRITIS OF SPINE WITH RADICULOPATHY, LUMBAR REGION: ICD-10-CM

## 2021-12-23 LAB
ALBUMIN SERPL-MCNC: 4.3 G/DL (ref 3.5–5)
ALBUMIN/GLOB SERPL: 1.7 {RATIO} (ref 1.1–2.2)
ALP SERPL-CCNC: 73 U/L (ref 45–117)
ALT SERPL-CCNC: 19 U/L (ref 12–78)
ANION GAP SERPL CALC-SCNC: 2 MMOL/L (ref 5–15)
APPEARANCE UR: CLEAR
AST SERPL-CCNC: 13 U/L (ref 15–37)
BASOPHILS # BLD: 0 K/UL (ref 0–0.1)
BASOPHILS NFR BLD: 1 % (ref 0–1)
BILIRUB SERPL-MCNC: 0.6 MG/DL (ref 0.2–1)
BILIRUB UR QL: NEGATIVE
BUN SERPL-MCNC: 14 MG/DL (ref 6–20)
BUN/CREAT SERPL: 16 (ref 12–20)
CALCIUM SERPL-MCNC: 9.6 MG/DL (ref 8.5–10.1)
CHLORIDE SERPL-SCNC: 108 MMOL/L (ref 97–108)
CHOLEST SERPL-MCNC: 193 MG/DL
CO2 SERPL-SCNC: 33 MMOL/L (ref 21–32)
COLOR UR: NORMAL
CREAT SERPL-MCNC: 0.85 MG/DL (ref 0.7–1.3)
DIFFERENTIAL METHOD BLD: ABNORMAL
EOSINOPHIL # BLD: 0.1 K/UL (ref 0–0.4)
EOSINOPHIL NFR BLD: 2 % (ref 0–7)
ERYTHROCYTE [DISTWIDTH] IN BLOOD BY AUTOMATED COUNT: 12.9 % (ref 11.5–14.5)
GLOBULIN SER CALC-MCNC: 2.6 G/DL (ref 2–4)
GLUCOSE SERPL-MCNC: 102 MG/DL (ref 65–100)
GLUCOSE UR STRIP.AUTO-MCNC: NEGATIVE MG/DL
HCT VFR BLD AUTO: 43.5 % (ref 36.6–50.3)
HDLC SERPL-MCNC: 53 MG/DL
HDLC SERPL: 3.6 {RATIO} (ref 0–5)
HGB BLD-MCNC: 13.8 G/DL (ref 12.1–17)
HGB UR QL STRIP: NEGATIVE
IMM GRANULOCYTES # BLD AUTO: 0 K/UL (ref 0–0.04)
IMM GRANULOCYTES NFR BLD AUTO: 0 % (ref 0–0.5)
KETONES UR QL STRIP.AUTO: NEGATIVE MG/DL
LDLC SERPL CALC-MCNC: 118.4 MG/DL (ref 0–100)
LEUKOCYTE ESTERASE UR QL STRIP.AUTO: NEGATIVE
LYMPHOCYTES # BLD: 1.9 K/UL (ref 0.8–3.5)
LYMPHOCYTES NFR BLD: 30 % (ref 12–49)
MCH RBC QN AUTO: 31.9 PG (ref 26–34)
MCHC RBC AUTO-ENTMCNC: 31.7 G/DL (ref 30–36.5)
MCV RBC AUTO: 100.7 FL (ref 80–99)
MONOCYTES # BLD: 0.6 K/UL (ref 0–1)
MONOCYTES NFR BLD: 10 % (ref 5–13)
NEUTS SEG # BLD: 3.7 K/UL (ref 1.8–8)
NEUTS SEG NFR BLD: 57 % (ref 32–75)
NITRITE UR QL STRIP.AUTO: NEGATIVE
NRBC # BLD: 0 K/UL (ref 0–0.01)
NRBC BLD-RTO: 0 PER 100 WBC
PH UR STRIP: 7 [PH] (ref 5–8)
PLATELET # BLD AUTO: 211 K/UL (ref 150–400)
PMV BLD AUTO: 10.4 FL (ref 8.9–12.9)
POTASSIUM SERPL-SCNC: 5.5 MMOL/L (ref 3.5–5.1)
PROT SERPL-MCNC: 6.9 G/DL (ref 6.4–8.2)
PROT UR STRIP-MCNC: NEGATIVE MG/DL
RBC # BLD AUTO: 4.32 M/UL (ref 4.1–5.7)
SODIUM SERPL-SCNC: 143 MMOL/L (ref 136–145)
SP GR UR REFRACTOMETRY: 1.02 (ref 1–1.03)
TRIGL SERPL-MCNC: 108 MG/DL (ref ?–150)
UROBILINOGEN UR QL STRIP.AUTO: 0.2 EU/DL (ref 0.2–1)
VLDLC SERPL CALC-MCNC: 21.6 MG/DL
WBC # BLD AUTO: 6.3 K/UL (ref 4.1–11.1)

## 2021-12-28 NOTE — PROGRESS NOTES
Labs overall are excellent. Urinalysis clear. Glucose normal.  Liver and kidney function normal.  Potassium just above normal range and will be monitored.

## 2021-12-29 ENCOUNTER — OFFICE VISIT (OUTPATIENT)
Dept: INTERNAL MEDICINE CLINIC | Age: 86
End: 2021-12-29
Payer: MEDICARE

## 2021-12-29 VITALS
DIASTOLIC BLOOD PRESSURE: 78 MMHG | HEART RATE: 70 BPM | RESPIRATION RATE: 18 BRPM | WEIGHT: 187 LBS | SYSTOLIC BLOOD PRESSURE: 138 MMHG | HEIGHT: 69 IN | OXYGEN SATURATION: 96 % | BODY MASS INDEX: 27.7 KG/M2

## 2021-12-29 DIAGNOSIS — Z00.00 MEDICARE ANNUAL WELLNESS VISIT, SUBSEQUENT: Primary | ICD-10-CM

## 2021-12-29 DIAGNOSIS — Z13.31 SCREENING FOR DEPRESSION: ICD-10-CM

## 2021-12-29 DIAGNOSIS — Z13.6 SCREENING FOR ISCHEMIC HEART DISEASE: ICD-10-CM

## 2021-12-29 PROCEDURE — 1101F PT FALLS ASSESS-DOCD LE1/YR: CPT | Performed by: INTERNAL MEDICINE

## 2021-12-29 PROCEDURE — G8427 DOCREV CUR MEDS BY ELIG CLIN: HCPCS | Performed by: INTERNAL MEDICINE

## 2021-12-29 PROCEDURE — G8536 NO DOC ELDER MAL SCRN: HCPCS | Performed by: INTERNAL MEDICINE

## 2021-12-29 PROCEDURE — G0439 PPPS, SUBSEQ VISIT: HCPCS | Performed by: INTERNAL MEDICINE

## 2021-12-29 PROCEDURE — G8432 DEP SCR NOT DOC, RNG: HCPCS | Performed by: INTERNAL MEDICINE

## 2021-12-29 PROCEDURE — G8419 CALC BMI OUT NRM PARAM NOF/U: HCPCS | Performed by: INTERNAL MEDICINE

## 2021-12-29 NOTE — PROGRESS NOTES
This is the Subsequent Medicare Annual Wellness Exam, performed 12 months or more after the Initial AWV or the last Subsequent AWV    I have reviewed the patient's medical history in detail and updated the computerized patient record. Assessment/Plan   Education and counseling provided:  Are appropriate based on today's review and evaluation    1. Medicare annual wellness visit, subsequent  2. Screening for ischemic heart disease  3. Screening for depression  -     DEPRESSION SCREEN ANNUAL       Depression Risk Factor Screening     3 most recent PHQ Screens 5/21/2021   Little interest or pleasure in doing things Not at all   Feeling down, depressed, irritable, or hopeless Not at all   Total Score PHQ 2 0       Alcohol & Drug Abuse Risk Screen    Do you average more than 1 drink per night or more than 7 drinks a week: No    In the past three months have you have had more than 4 drinks containing alcohol on one occasion: No          Functional Ability and Level of Safety    Hearing: The patient wears hearing aids. Activities of Daily Living: The home contains: no safety equipment. Patient does total self care      Ambulation: with no difficulty     Fall Risk:  Fall Risk Assessment, last 12 mths 6/23/2021   Able to walk? Yes   Fall in past 12 months? 0   Do you feel unsteady?  0   Are you worried about falling 0      Abuse Screen:  Patient is not abused       Cognitive Screening    Has your family/caregiver stated any concerns about your memory: no     Cognitive Screening: Normal - Verbal Fluency Test    Health Maintenance Due     Health Maintenance Due   Topic Date Due    DTaP/Tdap/Td series (1 - Tdap) Never done    Shingrix Vaccine Age 50> (1 of 2) Never done    Pneumococcal 65+ years (1 of 1 - PPSV23) Never done    Flu Vaccine (1) Never done    COVID-19 Vaccine (3 - Booster for Vicente Peter series) 11/22/2021       Patient Care Team   Patient Care Team:  Evon Light MD as PCP - General (Internal Medicine)  Drew Smith MD [facility]CP [facility]  Azalea Aguilar MD as Surgeon (General Surgery)  Souleymane Harley MD (Orthopedic Surgery)    History     Patient Active Problem List   Diagnosis Code    HNP (herniated nucleus pulposus), lumbar M51.26    Constipation K59.00    Hyperkalemia E87.5    Allergic rhinitis J30.9    Herpes zoster dermatitis B02.8, L30.8    Meningitis, viral A87.9    Arthritis of knee, left M17.12    Sciatica of left side associated with disorder of lumbosacral spine M53.87    Spinal stenosis M48.00    Hypertension I10    Sciatica M54.30    Insomnia G47.00    Prostate cancer screening Z12.5    Osteoarthritis of spine with radiculopathy, lumbar region M47.26    Acute left-sided low back pain without sciatica M54.50    Right inguinal hernia K40.90    S/P lumbar laminectomy Z98.890    Anxiety F41.9     Past Medical History:   Diagnosis Date    Allergic rhinitis 12/15/2017    Arthritis of knee, left 12/15/2017    Aseptic meningitis     Chronic pain     chronic back pain    Colon polyps     Constipation 12/15/2017    Dysphagia 12/15/2017    Elevated blood pressure reading 12/15/2017    Fatigue 12/15/2017    Headache 12/15/2017    Hematuria 12/15/2017    Herpes zoster dermatitis 12/15/2017    Hyperkalemia 12/15/2017    Hypertension 12/15/2017    Insomnia 12/15/2017    Osteoarthritis 12/15/2017    Otitis externa 12/15/2017    Prostate cancer screening 12/15/2017    Right groin hernia     Sciatica 12/15/2017    Sciatica of left side associated with disorder of lumbosacral spine 12/15/2017    Shingles     Spinal stenosis 12/15/2017      Past Surgical History:   Procedure Laterality Date    COLONOSCOPY N/A 11/29/2018    [facility]  11/11/2014         COLONOSCOPY,DIAGNOSTIC  11/29/2018         HX CATARACT REMOVAL Bilateral     with lens implants    HX COLONOSCOPY  HX HERNIA REPAIR Left     HX HERNIA REPAIR  06/05/2019    HX ORTHOPAEDIC  2008    rods placed in back    HX ORTHOPAEDIC  10/6/15    LEFT L5-S1 MICRODISCECTOMY     HX ORTHOPAEDIC  08/2018    glynn replacement, by Dr. Caitlyn Spencer      steroid injection for back pain    HX TONSILLECTOMY      ID EGD INSERT GUIDE WIRE DILATOR PASSAGE ESOPHAGUS  10/17/2013         ID EGD TRANSORAL BIOPSY SINGLE/MULTIPLE  10/17/2013          Current Outpatient Medications   Medication Sig Dispense Refill    ALPRAZolam (XANAX) 0.5 mg tablet TAKE 1 TABLET BY MOUTH THREE TIMES A DAY AS NEEDED FOR ANXIETY 90 Tablet 0    gabapentin (NEURONTIN) 400 mg capsule TAKE 1 CAPSULE BY MOUTH THREE TIMES A DAY 90 Capsule 1    tiZANidine (ZANAFLEX) 4 mg tablet TAKE 1 TABLET BY MOUTH EVERY DAY EVERY NIGHT 30 Tablet 11    amLODIPine (NORVASC) 5 mg tablet TAKE 1 TABLET BY MOUTH EVERY DAY 90 Tablet 3    polyethylene glycol (MIRALAX) 17 gram/dose powder Take 17 g by mouth daily. (Patient taking differently: Take 17 g by mouth as needed.) 510 g 0    Wheat Dextrin (BENEFIBER CLEAR) 3 gram/3.5 gram pwpk Take 1 Each by mouth daily.  vit C/E/Zn/coppr/lutein/zeaxan (PRESERVISION AREDS 2 PO) Take 1 Tab by mouth two (2) times a day.  acetaminophen (TYLENOL) 325 mg tablet Take 650 mg by mouth every four (4) hours as needed for Pain.        No Known Allergies    Family History   Problem Relation Age of Onset    Lung Disease Mother         lung cancer    Hypertension Mother     Heart Failure Mother     Hypertension Father     Heart Failure Father      Social History     Tobacco Use    Smoking status: Never Smoker    Smokeless tobacco: Never Used   Substance Use Topics    Alcohol use: Never         Kim Christy MD

## 2021-12-29 NOTE — PATIENT INSTRUCTIONS
Medicare Wellness Visit, Male    The best way to live healthy is to have a lifestyle where you eat a well-balanced diet, exercise regularly, limit alcohol use, and quit all forms of tobacco/nicotine, if applicable. Regular preventive services are another way to keep healthy. Preventive services (vaccines, screening tests, monitoring & exams) can help personalize your care plan, which helps you manage your own care. Screening tests can find health problems at the earliest stages, when they are easiest to treat. [facility] follows the current, evidence-based guidelines published by the [facility] ([facility]) when recommending preventive services for our patients. Because we follow these guidelines, sometimes recommendations change over time as research supports it. (For example, a prostate screening blood test is no longer routinely recommended for men with no symptoms). Of course, you and your doctor may decide to screen more often for some diseases, based on your risk and co-morbidities (chronic disease you are already diagnosed with). Preventive services for you include:  - Medicare offers their members a free annual wellness visit, which is time for you and your primary care provider to discuss and plan for your preventive service needs. Take advantage of this benefit every year!  -All adults over age 72 should receive the recommended pneumonia vaccines. Current USPSTF guidelines recommend a series of two vaccines for the best pneumonia protection.   -All adults should have a flu vaccine yearly and tetanus vaccine every 10 years.  -All adults age 48 and older should receive the shingles vaccines (series of two vaccines).        -All adults age 38-68 who are overweight should have a diabetes screening test once every three years.   -Other screening tests & preventive services for persons with diabetes include: an eye exam to screen for diabetic retinopathy, a kidney function test, a foot exam, and stricter control over your cholesterol.   -Cardiovascular screening for adults with routine risk involves an electrocardiogram (ECG) at intervals determined by the provider.   -Colorectal cancer screening should be done for adults age 54-65 with no increased risk factors for colorectal cancer. There are a number of acceptable methods of screening for this type of cancer. Each test has its own benefits and drawbacks. Discuss with your provider what is most appropriate for you during your annual wellness visit. The different tests include: colonoscopy (considered the best screening method), a fecal occult blood test, a fecal DNA test, and sigmoidoscopy.  -All adults born between [facility] should be screened once for Hepatitis C.  -An Abdominal Aortic Aneurysm (AAA) Screening is recommended for men age 73-68 who has ever smoked in their lifetime.      Here is a list of your current Health Maintenance items (your personalized list of preventive services) with a due date:  Health Maintenance Due   Topic Date Due    DTaP/Tdap/Td  (1 - Tdap) Never done    Shingles Vaccine (1 of 2) Never done    Pneumococcal Vaccine (1 of 1 - PPSV23) Never done    Yearly Flu Vaccine (1) Never done    COVID-19 Vaccine (3 - Booster for Vicente Peter series) 11/22/2021

## 2021-12-29 NOTE — PROGRESS NOTES
1. Have you been to the ER, urgent care clinic since your last visit? Hospitalized since your last visit? No    2. Have you seen or consulted any other health care providers outside of the [address] [place] [address] since your last visit? Include any pap smears or colon screening.  No

## 2022-01-06 DIAGNOSIS — M48.061 SPINAL STENOSIS OF LUMBAR REGION, UNSPECIFIED WHETHER NEUROGENIC CLAUDICATION PRESENT: ICD-10-CM

## 2022-01-06 RX ORDER — GABAPENTIN 400 MG/1
CAPSULE ORAL
Qty: 90 CAPSULE | Refills: 1 | Status: SHIPPED | OUTPATIENT
Start: 2022-01-06 | End: 2022-03-10

## 2022-02-10 DIAGNOSIS — F41.9 ANXIETY: ICD-10-CM

## 2022-02-10 RX ORDER — ALPRAZOLAM 0.5 MG/1
TABLET ORAL
Qty: 90 TABLET | Refills: 2 | Status: SHIPPED | OUTPATIENT
Start: 2022-02-10 | End: 2022-02-25 | Stop reason: SDUPTHER

## 2022-02-10 NOTE — TELEPHONE ENCOUNTER
Last Refill: 12/10/21  Last Visit: 12/29/2021   Next Visit: 6/29/2022    Requested Prescriptions     Pending Prescriptions Disp Refills    ALPRAZolam (XANAX) 0.5 mg tablet 90 Tablet 0

## 2022-02-25 DIAGNOSIS — F41.9 ANXIETY: ICD-10-CM

## 2022-02-25 RX ORDER — ALPRAZOLAM 0.5 MG/1
TABLET ORAL
Qty: 90 TABLET | Refills: 2 | Status: SHIPPED | OUTPATIENT
Start: 2022-02-25 | End: 2022-09-12

## 2022-02-25 NOTE — TELEPHONE ENCOUNTER
Last Refill: 2/10/22 [facility] - order cancelled needs to be sent to [facility] in [place].   Last Visit: 12/29/2021   Next Visit: 6/29/2022    Requested Prescriptions     Pending Prescriptions Disp Refills    ALPRAZolam (XANAX) 0.5 mg tablet 90 Tablet 2     Sig: TAKE 1 TABLET BY MOUTH THREE TIMES A DAY AS NEEDED FOR ANXIETY

## 2022-03-10 DIAGNOSIS — M48.061 SPINAL STENOSIS OF LUMBAR REGION, UNSPECIFIED WHETHER NEUROGENIC CLAUDICATION PRESENT: ICD-10-CM

## 2022-03-10 RX ORDER — GABAPENTIN 400 MG/1
CAPSULE ORAL
Qty: 90 CAPSULE | Refills: 2 | Status: SHIPPED | OUTPATIENT
Start: 2022-03-10 | End: 2022-06-13

## 2022-03-18 PROBLEM — A87.9 MENINGITIS, VIRAL: Status: ACTIVE | Noted: 2017-12-15

## 2022-03-18 PROBLEM — M47.26 OSTEOARTHRITIS OF SPINE WITH RADICULOPATHY, LUMBAR REGION: Status: ACTIVE | Noted: 2017-10-19

## 2022-03-18 PROBLEM — K40.90 RIGHT INGUINAL HERNIA: Status: ACTIVE | Noted: 2019-06-05

## 2022-03-19 PROBLEM — J30.9 ALLERGIC RHINITIS: Status: ACTIVE | Noted: 2017-12-15

## 2022-03-19 PROBLEM — M54.30 SCIATICA: Status: ACTIVE | Noted: 2017-12-15

## 2022-03-19 PROBLEM — E87.5 HYPERKALEMIA: Status: ACTIVE | Noted: 2017-12-15

## 2022-03-19 PROBLEM — B02.8 HERPES ZOSTER DERMATITIS: Status: ACTIVE | Noted: 2017-12-15

## 2022-03-19 PROBLEM — M17.12 ARTHRITIS OF KNEE, LEFT: Status: ACTIVE | Noted: 2017-12-15

## 2022-03-19 PROBLEM — L30.8 HERPES ZOSTER DERMATITIS: Status: ACTIVE | Noted: 2017-12-15

## 2022-03-19 PROBLEM — F41.9 ANXIETY: Status: ACTIVE | Noted: 2021-06-23

## 2022-03-19 PROBLEM — M48.00 SPINAL STENOSIS: Status: ACTIVE | Noted: 2017-12-15

## 2022-03-19 PROBLEM — Z12.5 PROSTATE CANCER SCREENING: Status: ACTIVE | Noted: 2017-12-15

## 2022-03-19 PROBLEM — G47.00 INSOMNIA: Status: ACTIVE | Noted: 2017-12-15

## 2022-03-19 PROBLEM — M54.50 ACUTE LEFT-SIDED LOW BACK PAIN WITHOUT SCIATICA: Status: ACTIVE | Noted: 2018-12-13

## 2022-03-19 PROBLEM — K59.00 CONSTIPATION: Status: ACTIVE | Noted: 2017-12-15

## 2022-03-20 PROBLEM — I10 HYPERTENSION: Status: ACTIVE | Noted: 2017-12-15

## 2022-03-20 PROBLEM — M53.87 SCIATICA OF LEFT SIDE ASSOCIATED WITH DISORDER OF LUMBOSACRAL SPINE: Status: ACTIVE | Noted: 2017-12-15

## 2022-03-20 PROBLEM — Z98.890 S/P LUMBAR LAMINECTOMY: Status: ACTIVE | Noted: 2017-10-19

## 2022-06-03 RX ORDER — AMLODIPINE BESYLATE 5 MG/1
TABLET ORAL
Qty: 30 TABLET | Refills: 5 | Status: SHIPPED | OUTPATIENT
Start: 2022-06-03

## 2022-06-29 ENCOUNTER — OFFICE VISIT (OUTPATIENT)
Dept: INTERNAL MEDICINE CLINIC | Age: 87
End: 2022-06-29
Payer: MEDICARE

## 2022-06-29 VITALS
RESPIRATION RATE: 14 BRPM | DIASTOLIC BLOOD PRESSURE: 86 MMHG | TEMPERATURE: 98.2 F | HEART RATE: 75 BPM | BODY MASS INDEX: 27.22 KG/M2 | HEIGHT: 69 IN | WEIGHT: 183.8 LBS | SYSTOLIC BLOOD PRESSURE: 148 MMHG | OXYGEN SATURATION: 96 %

## 2022-06-29 DIAGNOSIS — F41.9 ANXIETY: ICD-10-CM

## 2022-06-29 DIAGNOSIS — M47.26 OSTEOARTHRITIS OF SPINE WITH RADICULOPATHY, LUMBAR REGION: ICD-10-CM

## 2022-06-29 DIAGNOSIS — I10 PRIMARY HYPERTENSION: Primary | ICD-10-CM

## 2022-06-29 PROCEDURE — G8510 SCR DEP NEG, NO PLAN REQD: HCPCS | Performed by: INTERNAL MEDICINE

## 2022-06-29 PROCEDURE — G8536 NO DOC ELDER MAL SCRN: HCPCS | Performed by: INTERNAL MEDICINE

## 2022-06-29 PROCEDURE — 1101F PT FALLS ASSESS-DOCD LE1/YR: CPT | Performed by: INTERNAL MEDICINE

## 2022-06-29 PROCEDURE — 1123F ACP DISCUSS/DSCN MKR DOCD: CPT | Performed by: INTERNAL MEDICINE

## 2022-06-29 PROCEDURE — G8427 DOCREV CUR MEDS BY ELIG CLIN: HCPCS | Performed by: INTERNAL MEDICINE

## 2022-06-29 PROCEDURE — 99213 OFFICE O/P EST LOW 20 MIN: CPT | Performed by: INTERNAL MEDICINE

## 2022-06-29 PROCEDURE — G8417 CALC BMI ABV UP PARAM F/U: HCPCS | Performed by: INTERNAL MEDICINE

## 2022-06-29 NOTE — PROGRESS NOTES
Anamaria Aldridge is a 80 y.o. male presenting for Follow Up Chronic Condition (6 mo fu)  . 1. Have you been to the ER, urgent care clinic since your last visit? Hospitalized since your last visit? No    2. Have you seen or consulted any other health care providers outside of the [address] [place] [address] since your last visit? Include any pap smears or colon screening. No    Fall Risk Assessment, last 12 mths 6/29/2022   Able to walk? Yes   Fall in past 12 months? 0   Do you feel unsteady? 0   Are you worried about falling 0         Abuse Screening Questionnaire 6/19/2020   Do you ever feel afraid of your partner? N   Are you in a relationship with someone who physically or mentally threatens you? N   Is it safe for you to go home? Y       3 most recent PHQ Screens 6/29/2022   Little interest or pleasure in doing things Not at all   Feeling down, depressed, irritable, or hopeless Not at all   Total Score PHQ 2 0       There are no discontinued medications.

## 2022-06-29 NOTE — PROGRESS NOTES
Roberto Martinez is a 80 y.o. male and presents with Follow Up Chronic Condition (6 mo fu)  . Subjective:  Mr. Marnie Finnegan presents today for follow-up of hypertension. He remains on amlodipine 5 mg daily. He does have some chronic back pain and arthritis for which she remains on gabapentin 400 mg 3 times daily. He takes alprazolam on an as-needed basis. He has no shortness of breath, chest pain, palpitations, PND, orthopnea, or pedal edema.     Past Medical History:   Diagnosis Date    Allergic rhinitis 12/15/2017    Arthritis of knee, left 12/15/2017    Aseptic meningitis     Chronic pain     chronic back pain    Colon polyps     Constipation 12/15/2017    Dysphagia 12/15/2017    Elevated blood pressure reading 12/15/2017    Fatigue 12/15/2017    Headache 12/15/2017    Hematuria 12/15/2017    Herpes zoster dermatitis 12/15/2017    Hyperkalemia 12/15/2017    Hypertension 12/15/2017    Insomnia 12/15/2017    Osteoarthritis 12/15/2017    Otitis externa 12/15/2017    Prostate cancer screening 12/15/2017    Right groin hernia     Sciatica 12/15/2017    Sciatica of left side associated with disorder of lumbosacral spine 12/15/2017    Shingles     Spinal stenosis 12/15/2017     Past Surgical History:   Procedure Laterality Date    COLONOSCOPY N/A 11/29/2018    COL[facility]  11/11/2014         COLONOSCOPY,DIAGNOSTIC  11/29/2018         HX CATARACT REMOVAL Bilateral     with lens implants    HX COLONOSCOPY      HX HERNIA REPAIR Left     HX HERNIA REPAIR  06/05/2019    HX ORTHOPAEDIC  2008    rods placed in back    HX ORTHOPAEDIC  10/6/15    LEFT L5-S1 MICRODISCECTOMY     HX ORTHOPAEDIC  08/2018    glynn replacement, by Dr. Ace Fang      steroid injection for back pain    HX TONSILLECTOMY      UT EGD INSERT GUIDE WIRE DILATOR PASSAGE ESOPHAGUS  10/17/2013         UT EGD TRANSORAL BIOPSY SINGLE/MULTIPLE  10/17/2013          No Known Allergies  Current Outpatient Medications   Medication Sig Dispense Refill    gabapentin (NEURONTIN) 400 mg capsule TAKE 1 CAPSULE BY MOUTH THREE TIMES A DAY 90 Capsule 2    amLODIPine (NORVASC) 5 mg tablet TAKE 1 TABLET BY MOUTH EVERY DAY 30 Tablet 5    tiZANidine (ZANAFLEX) 4 mg tablet TAKE 1 TABLET BY MOUTH EVERY DAY EVERY NIGHT 30 Tablet 11    polyethylene glycol (MIRALAX) 17 gram/dose powder Take 17 g by mouth daily. (Patient taking differently: Take 17 g by mouth as needed.) 510 g 0    Wheat Dextrin (BENEFIBER CLEAR) 3 gram/3.5 gram pwpk Take 1 Each by mouth daily.  vit C/E/Zn/coppr/lutein/zeaxan (PRESERVISION AREDS 2 PO) Take 1 Tab by mouth two (2) times a day.  acetaminophen (TYLENOL) 325 mg tablet Take 650 mg by mouth every four (4) hours as needed for Pain.  ALPRAZolam (XANAX) 0.5 mg tablet TAKE 1 TABLET BY MOUTH THREE TIMES A DAY AS NEEDED FOR ANXIETY 90 Tablet 2     Social History     Socioeconomic History    Marital status:    Tobacco Use    Smoking status: Never Smoker    Smokeless tobacco: Never Used   Vaping Use    Vaping Use: Never used   Substance and Sexual Activity    Alcohol use: Never    Drug use: Never     Family History   Problem Relation Age of Onset    Lung Disease Mother         lung cancer    Hypertension Mother     Heart Failure Mother     Hypertension Father     Heart Failure Father        Review of Systems  Constitutional:  negative for fevers, chills, anorexia and weight loss  Eyes:    negative for visual disturbance and irritation  ENT:    negative for tinnitus,sore throat,nasal congestion,ear pains. hoarseness  Respiratory:     negative for cough, hemoptysis, dyspnea,wheezing  CV:    negative for chest pain, palpitations, lower extremity edema  GI:    negative for nausea, vomiting, diarrhea, abdominal pain,melena  Endo:               negative for polyuria,polydipsia,polyphagia,heat intolerance  Genitourinary : negative for frequency, dysuria and hematuria  Integumentary: negative for rash and pruritus  Hematologic:   negative for easy bruising and gum/nose bleeding  Musculoskel:  negative for myalgias, arthralgias, back pain, muscle weakness, joint pain  Neurological:   negative for headaches, dizziness, vertigo, memory problems and gait   Behavl/Psych:  negative for feelings of anxiety, depression, mood changes  ROS otherwise negative      Objective:  Visit Vitals  BP (!) 148/86 (BP 1 Location: Left upper arm, BP Patient Position: Sitting, BP Cuff Size: Adult)   Pulse 75   Temp 98.2 °F (36.8 °C) (Oral)   Resp 14   Ht 5' 9\" (1.753 m)   Wt 183 lb 12.8 oz (83.4 kg)   SpO2 96%   BMI 27.14 kg/m²     Physical Exam:   General appearance - alert, well appearing, and in no distress  Mental status - alert, oriented to person, place, and time  EYE-TRACY, EOMI, fundi normal, corneas normal, no foreign bodies  ENT-ENT exam normal, no neck nodes or sinus tenderness  Nose - normal and patent, no erythema, discharge or polyps  Mouth - mucous membranes moist, pharynx normal without lesions  Neck - supple, no significant adenopathy   Chest - clear to auscultation, no wheezes, rales or rhonchi, symmetric air entry   Heart - normal rate, regular rhythm, normal S1, S2, no murmurs, rubs, clicks or gallops   Abdomen - soft, nontender, nondistended, no masses or organomegaly  Lymph- no adenopathy palpable  Ext-peripheral pulses normal, no pedal edema, no clubbing or cyanosis  Skin-Warm and dry. no hyperpigmentation, vitiligo, or suspicious lesions  Neuro -alert, oriented, normal speech, no focal findings or movement disorder noted      Assessment/Plan:  Diagnoses and all orders for this visit:    1. Primary hypertension    2. Osteoarthritis of spine with radiculopathy, lumbar region    3. Anxiety          ICD-10-CM ICD-9-CM    1. Primary hypertension  I10 401.9    2.  Osteoarthritis of spine with radiculopathy, lumbar region  M47.26 721.3    3. Anxiety  F41.9 300.00      Plan:    Continue current medical regimen as outlined above. His blood pressure is borderline elevated today. If this persist on follow-up we will consider adjusting his medication accordingly. Follow-up and Dispositions    · Return in about 6 months (around 12/29/2022) for follow up. I have reviewed with the patient details of the assessment and plan and all questions were answered. Relevent patient education was performed. Verbal and/or written instructions (see AVS) provided. The most recent lab findings were reviewed with the patient. Plan was discussed with patient who verbally expressed understanding. An After Visit Summary was printed and given to the patient.     Kelly Ramos MD

## 2022-09-12 DIAGNOSIS — F41.9 ANXIETY: ICD-10-CM

## 2022-09-12 DIAGNOSIS — M48.061 SPINAL STENOSIS OF LUMBAR REGION, UNSPECIFIED WHETHER NEUROGENIC CLAUDICATION PRESENT: ICD-10-CM

## 2022-09-12 RX ORDER — GABAPENTIN 400 MG/1
CAPSULE ORAL
Qty: 90 CAPSULE | Refills: 2 | Status: SHIPPED | OUTPATIENT
Start: 2022-09-12

## 2022-09-12 RX ORDER — ALPRAZOLAM 0.5 MG/1
TABLET ORAL
Qty: 90 TABLET | Refills: 2 | Status: SHIPPED | OUTPATIENT
Start: 2022-09-12

## 2022-11-09 RX ORDER — TIZANIDINE 4 MG/1
TABLET ORAL
Qty: 30 TABLET | Refills: 5 | Status: SHIPPED | OUTPATIENT
Start: 2022-11-09

## 2022-12-06 RX ORDER — AMLODIPINE BESYLATE 5 MG/1
TABLET ORAL
Qty: 30 TABLET | Refills: 5 | Status: SHIPPED | OUTPATIENT
Start: 2022-12-06

## 2022-12-08 DIAGNOSIS — M48.061 SPINAL STENOSIS OF LUMBAR REGION, UNSPECIFIED WHETHER NEUROGENIC CLAUDICATION PRESENT: ICD-10-CM

## 2022-12-09 RX ORDER — GABAPENTIN 400 MG/1
CAPSULE ORAL
Qty: 90 CAPSULE | Refills: 2 | Status: SHIPPED | OUTPATIENT
Start: 2022-12-09

## 2022-12-23 ENCOUNTER — LAB ONLY (OUTPATIENT)
Dept: INTERNAL MEDICINE CLINIC | Age: 87
End: 2022-12-23

## 2022-12-23 DIAGNOSIS — M48.061 SPINAL STENOSIS OF LUMBAR REGION, UNSPECIFIED WHETHER NEUROGENIC CLAUDICATION PRESENT: Primary | ICD-10-CM

## 2022-12-23 DIAGNOSIS — R53.83 FATIGUE, UNSPECIFIED TYPE: ICD-10-CM

## 2022-12-23 DIAGNOSIS — Z13.6 SCREENING FOR ISCHEMIC HEART DISEASE: ICD-10-CM

## 2022-12-23 DIAGNOSIS — I10 PRIMARY HYPERTENSION: ICD-10-CM

## 2022-12-24 LAB
ALBUMIN SERPL-MCNC: 3.8 G/DL (ref 3.5–5)
ALBUMIN/GLOB SERPL: 1.6 {RATIO} (ref 1.1–2.2)
ALP SERPL-CCNC: 70 U/L (ref 45–117)
ALT SERPL-CCNC: 16 U/L (ref 12–78)
ANION GAP SERPL CALC-SCNC: 2 MMOL/L (ref 5–15)
APPEARANCE UR: CLEAR
AST SERPL-CCNC: 16 U/L (ref 15–37)
BACTERIA URNS QL MICRO: NEGATIVE /HPF
BASOPHILS # BLD: 0 K/UL (ref 0–0.1)
BASOPHILS NFR BLD: 0 % (ref 0–1)
BILIRUB SERPL-MCNC: 0.4 MG/DL (ref 0.2–1)
BILIRUB UR QL: NEGATIVE
BUN SERPL-MCNC: 14 MG/DL (ref 6–20)
BUN/CREAT SERPL: 17 (ref 12–20)
CALCIUM SERPL-MCNC: 8.7 MG/DL (ref 8.5–10.1)
CHLORIDE SERPL-SCNC: 109 MMOL/L (ref 97–108)
CHOLEST SERPL-MCNC: 158 MG/DL
CO2 SERPL-SCNC: 31 MMOL/L (ref 21–32)
COLOR UR: ABNORMAL
CREAT SERPL-MCNC: 0.83 MG/DL (ref 0.7–1.3)
DIFFERENTIAL METHOD BLD: ABNORMAL
EOSINOPHIL # BLD: 0 K/UL (ref 0–0.4)
EOSINOPHIL NFR BLD: 1 % (ref 0–7)
EPITH CASTS URNS QL MICRO: ABNORMAL /LPF
ERYTHROCYTE [DISTWIDTH] IN BLOOD BY AUTOMATED COUNT: 12.9 % (ref 11.5–14.5)
GLOBULIN SER CALC-MCNC: 2.4 G/DL (ref 2–4)
GLUCOSE SERPL-MCNC: 100 MG/DL (ref 65–100)
GLUCOSE UR STRIP.AUTO-MCNC: NEGATIVE MG/DL
HCT VFR BLD AUTO: 40.2 % (ref 36.6–50.3)
HDLC SERPL-MCNC: 47 MG/DL
HDLC SERPL: 3.4 {RATIO} (ref 0–5)
HGB BLD-MCNC: 12.7 G/DL (ref 12.1–17)
HGB UR QL STRIP: NEGATIVE
HYALINE CASTS URNS QL MICRO: ABNORMAL /LPF (ref 0–5)
IMM GRANULOCYTES # BLD AUTO: 0 K/UL (ref 0–0.04)
IMM GRANULOCYTES NFR BLD AUTO: 0 % (ref 0–0.5)
KETONES UR QL STRIP.AUTO: ABNORMAL MG/DL
LDLC SERPL CALC-MCNC: 98.2 MG/DL (ref 0–100)
LEUKOCYTE ESTERASE UR QL STRIP.AUTO: NEGATIVE
LYMPHOCYTES # BLD: 1.4 K/UL (ref 0.8–3.5)
LYMPHOCYTES NFR BLD: 23 % (ref 12–49)
MCH RBC QN AUTO: 31.4 PG (ref 26–34)
MCHC RBC AUTO-ENTMCNC: 31.6 G/DL (ref 30–36.5)
MCV RBC AUTO: 99.3 FL (ref 80–99)
MONOCYTES # BLD: 1 K/UL (ref 0–1)
MONOCYTES NFR BLD: 16 % (ref 5–13)
NEUTS SEG # BLD: 3.6 K/UL (ref 1.8–8)
NEUTS SEG NFR BLD: 60 % (ref 32–75)
NITRITE UR QL STRIP.AUTO: NEGATIVE
NRBC # BLD: 0 K/UL (ref 0–0.01)
NRBC BLD-RTO: 0 PER 100 WBC
PH UR STRIP: 6 [PH] (ref 5–8)
PLATELET # BLD AUTO: 138 K/UL (ref 150–400)
PMV BLD AUTO: 10.6 FL (ref 8.9–12.9)
POTASSIUM SERPL-SCNC: 3.8 MMOL/L (ref 3.5–5.1)
PROT SERPL-MCNC: 6.2 G/DL (ref 6.4–8.2)
PROT UR STRIP-MCNC: 30 MG/DL
RBC # BLD AUTO: 4.05 M/UL (ref 4.1–5.7)
RBC #/AREA URNS HPF: ABNORMAL /HPF (ref 0–5)
SODIUM SERPL-SCNC: 142 MMOL/L (ref 136–145)
SP GR UR REFRACTOMETRY: 1.03 (ref 1–1.03)
TRIGL SERPL-MCNC: 64 MG/DL (ref ?–150)
UROBILINOGEN UR QL STRIP.AUTO: 1 EU/DL (ref 0.2–1)
VLDLC SERPL CALC-MCNC: 12.8 MG/DL
WBC # BLD AUTO: 6.1 K/UL (ref 4.1–11.1)
WBC URNS QL MICRO: ABNORMAL /HPF (ref 0–4)

## 2022-12-30 ENCOUNTER — OFFICE VISIT (OUTPATIENT)
Dept: INTERNAL MEDICINE CLINIC | Age: 87
End: 2022-12-30
Payer: MEDICARE

## 2022-12-30 VITALS
BODY MASS INDEX: 27.28 KG/M2 | HEIGHT: 69 IN | HEART RATE: 71 BPM | SYSTOLIC BLOOD PRESSURE: 132 MMHG | OXYGEN SATURATION: 96 % | TEMPERATURE: 98 F | WEIGHT: 184.2 LBS | DIASTOLIC BLOOD PRESSURE: 78 MMHG | RESPIRATION RATE: 18 BRPM

## 2022-12-30 DIAGNOSIS — I10 PRIMARY HYPERTENSION: ICD-10-CM

## 2022-12-30 DIAGNOSIS — M85.80 OSTEOPENIA, UNSPECIFIED LOCATION: ICD-10-CM

## 2022-12-30 DIAGNOSIS — M85.89 OTHER SPECIFIED DISORDERS OF BONE DENSITY AND STRUCTURE, MULTIPLE SITES: ICD-10-CM

## 2022-12-30 DIAGNOSIS — Z00.00 MEDICARE ANNUAL WELLNESS VISIT, SUBSEQUENT: Primary | ICD-10-CM

## 2022-12-30 DIAGNOSIS — M47.26 OSTEOARTHRITIS OF SPINE WITH RADICULOPATHY, LUMBAR REGION: ICD-10-CM

## 2022-12-30 DIAGNOSIS — Z13.6 SCREENING FOR ISCHEMIC HEART DISEASE: ICD-10-CM

## 2022-12-30 DIAGNOSIS — Z13.31 SCREENING FOR DEPRESSION: ICD-10-CM

## 2022-12-30 DIAGNOSIS — F41.9 ANXIETY: ICD-10-CM

## 2022-12-30 DIAGNOSIS — Z13.1 SCREENING FOR DIABETES MELLITUS: ICD-10-CM

## 2022-12-30 DIAGNOSIS — Z71.89 ADVANCED DIRECTIVES, COUNSELING/DISCUSSION: ICD-10-CM

## 2022-12-30 DIAGNOSIS — Z98.890 S/P LUMBAR LAMINECTOMY: ICD-10-CM

## 2022-12-30 PROCEDURE — 1123F ACP DISCUSS/DSCN MKR DOCD: CPT | Performed by: INTERNAL MEDICINE

## 2022-12-30 PROCEDURE — G8510 SCR DEP NEG, NO PLAN REQD: HCPCS | Performed by: INTERNAL MEDICINE

## 2022-12-30 PROCEDURE — 1101F PT FALLS ASSESS-DOCD LE1/YR: CPT | Performed by: INTERNAL MEDICINE

## 2022-12-30 PROCEDURE — G8536 NO DOC ELDER MAL SCRN: HCPCS | Performed by: INTERNAL MEDICINE

## 2022-12-30 PROCEDURE — G8417 CALC BMI ABV UP PARAM F/U: HCPCS | Performed by: INTERNAL MEDICINE

## 2022-12-30 PROCEDURE — G0439 PPPS, SUBSEQ VISIT: HCPCS | Performed by: INTERNAL MEDICINE

## 2022-12-30 PROCEDURE — G8427 DOCREV CUR MEDS BY ELIG CLIN: HCPCS | Performed by: INTERNAL MEDICINE

## 2022-12-30 NOTE — PROGRESS NOTES
Chief Complaint   Patient presents with    Follow Up Chronic Condition     6 mo fu    Annual Wellness Visit       Depression Risk Factor Screening:     3 most recent PHQ Screens 12/30/2022   Little interest or pleasure in doing things Not at all   Feeling down, depressed, irritable, or hopeless Not at all   Total Score PHQ 2 0       Functional Ability and Level of Safety:     Activities of Daily Living  ADL Assessment 12/30/2022   Feeding yourself No Help Needed   Getting from bed to chair No Help Needed   Getting dressed No Help Needed   Bathing or showering No Help Needed   Walk across the room (includes cane/walker) No Help Needed   Using the telphone No Help Needed   Taking your medications No Help Needed   Preparing meals No Help Needed   Managing money (expenses/bills) No Help Needed   Moderately strenuous housework (laundry) No Help Needed   Shopping for personal items (toiletries/medicines) No Help Needed   Shopping for groceries No Help Needed   Driving No Help Needed   Climbing a flight of stairs No Help Needed   Getting to places beyond walking distances No Help Needed       Fall Risk  Fall Risk Assessment, last 12 mths 12/30/2022   Able to walk? Yes   Fall in past 12 months? 0   Do you feel unsteady? 0   Are you worried about falling 0       Abuse Screen  Abuse Screening Questionnaire 12/30/2022   Do you ever feel afraid of your partner? N   Are you in a relationship with someone who physically or mentally threatens you? N   Is it safe for you to go home?  Y         Patient Care Team   Patient Care Team:  Aura Fair MD as PCP - General (Internal Medicine Physician)  Aura Fair MD as PCP - [facility] Empaneled Provider  Shira Gandara MD as Surgeon (General Surgery)  Janis Reagan MD (Orthopedic Surgery)

## 2022-12-30 NOTE — PROGRESS NOTES
This is the Subsequent Medicare Annual Wellness Exam, performed 12 months or more after the Initial AWV or the last Subsequent AWV    I have reviewed the patient's medical history in detail and updated the computerized patient record. Assessment/Plan   Education and counseling provided:  Are appropriate based on today's review and evaluation    1. Medicare annual wellness visit, subsequent  2. Primary hypertension  3. S/P lumbar laminectomy  4. Anxiety  5. Osteoarthritis of spine with radiculopathy, lumbar region  6. Advanced directives, counseling/discussion  7. Screening for diabetes mellitus  8. Screening for ischemic heart disease  9. Screening for depression  -     [facility]  10. Osteopenia, unspecified location  -     DEXA BONE DENSITY STUDY AXIAL; Future  11. Other specified disorders of bone density and structure, multiple sites   -     DEXA BONE DENSITY STUDY AXIAL; Future       Depression Risk Factor Screening     3 most recent PHQ Screens 12/30/2022   Little interest or pleasure in doing things Not at all   Feeling down, depressed, irritable, or hopeless Not at all   Total Score PHQ 2 0       Alcohol & Drug Abuse Risk Screen    Do you average more than 1 drink per night or more than 7 drinks a week: No    In the past three months have you have had more than 4 drinks containing alcohol on one occasion: No          Functional Ability and Level of Safety    Hearing: The patient wears hearing aids. Activities of Daily Living: The home contains: no safety equipment. Patient does total self care      Ambulation: with no difficulty     Fall Risk:  Fall Risk Assessment, last 12 mths 12/30/2022   Able to walk? Yes   Fall in past 12 months? 0   Do you feel unsteady?  0   Are you worried about falling 0      Abuse Screen:  Patient is not abused       Cognitive Screening    Has your family/caregiver stated any concerns about your memory: no     Cognitive Screening: Normal - Verbal Fluency Test    Health Maintenance Due     Health Maintenance Due   Topic Date Due    DTaP/Tdap/Td series (1 - Tdap) Never done    Shingles Vaccine (1 of 2) Never done    Pneumococcal 65+ years (1 - PCV) Never done    COVID-19 Vaccine (3 - Booster for Pfizer series) 07/17/2021       Patient Care Team   Patient Care Team:  Jj Cummings MD as PCP - General (Internal Medicine Physician)  Jj Cummings MD as PCP - [facility] Provider  Vishal Hopson MD as Surgeon (General Surgery)  Lidia Egan MD (Orthopedic Surgery)    History     Patient Active Problem List   Diagnosis Code    HNP (herniated nucleus pulposus), lumbar M51.26    Constipation K59.00    Hyperkalemia E87.5    Allergic rhinitis J30.9    Herpes zoster dermatitis B02.8, L30.8    Meningitis, viral A87.9    Arthritis of knee, left M17.12    Sciatica of left side associated with disorder of lumbosacral spine M53.87    Spinal stenosis M48.00    Hypertension I10    Sciatica M54.30    Insomnia G47.00    Prostate cancer screening Z12.5    Osteoarthritis of spine with radiculopathy, lumbar region M47.26    Acute left-sided low back pain without sciatica M54.50    Right inguinal hernia K40.90    S/P lumbar laminectomy Z98.890    Anxiety F41.9     Past Medical History:   Diagnosis Date    Allergic rhinitis 12/15/2017    Arthritis of knee, left 12/15/2017    Aseptic meningitis     Chronic pain     chronic back pain    Colon polyps     Constipation 12/15/2017    Dysphagia 12/15/2017    Elevated blood pressure reading 12/15/2017    Fatigue 12/15/2017    Headache 12/15/2017    Hematuria 12/15/2017    Herpes zoster dermatitis 12/15/2017    Hyperkalemia 12/15/2017    Hypertension 12/15/2017    Insomnia 12/15/2017    Osteoarthritis 12/15/2017    Otitis externa 12/15/2017    Prostate cancer screening 12/15/2017    Right groin hernia     Sciatica 12/15/2017    Sciatica of left side associated with disorder of lumbosacral spine 12/15/2017    Shingles     Spinal stenosis 12/15/2017      Past Surgical History:   Procedure Laterality Date    COLONOSCOPY N/A 11/29/2018    COLONOSCOPY performed by Don Salinas MD at \[facility]\"" ENDOSCOPY    COLONOSCOPY,DIAGNOSTIC  11/11/2014         COLONOSCOPY,DIAGNOSTIC  11/29/2018         HX CATARACT REMOVAL Bilateral     with lens implants    HX COLONOSCOPY      HX HERNIA REPAIR Left     HX HERNIA REPAIR  06/05/2019    HX ORTHOPAEDIC  2008    rods placed in back    HX ORTHOPAEDIC  10/6/15    LEFT L5-S1 MICRODISCECTOMY     HX ORTHOPAEDIC  08/2018    glynn replacement, by Dr. Gan Massed      steroid injection for back pain    HX TONSILLECTOMY      ME EGD INSERT GUIDE WIRE DILATOR PASSAGE ESOPHAGUS  10/17/2013         ME EGD TRANSORAL BIOPSY SINGLE/MULTIPLE  10/17/2013          Current Outpatient Medications   Medication Sig Dispense Refill    gabapentin (NEURONTIN) 400 mg capsule TAKE 1 CAPSULE BY MOUTH THREE TIMES A DAY 90 Capsule 2    amLODIPine (NORVASC) 5 mg tablet TAKE 1 TABLET BY MOUTH EVERY DAY 30 Tablet 5    tiZANidine (ZANAFLEX) 4 mg tablet TAKE 1 TABLET BY MOUTH EVERY DAY EVERY NIGHT 30 Tablet 5    ALPRAZolam (XANAX) 0.5 mg tablet TAKE 1 TABLET BY MOUTH THREE TIMES A DAY AS NEEDED FOR ANXIETY 90 Tablet 2    polyethylene glycol (MIRALAX) 17 gram/dose powder Take 17 g by mouth daily. (Patient taking differently: Take 17 g by mouth as needed.) 510 g 0    Wheat Dextrin 3 gram/3.5 gram pwpk Take 1 Each by mouth daily. vit C/E/Zn/coppr/lutein/zeaxan (PRESERVISION AREDS 2 PO) Take 1 Tab by mouth two (2) times a day. acetaminophen (TYLENOL) 325 mg tablet Take 650 mg by mouth every four (4) hours as needed for Pain.        No Known Allergies    Family History   Problem Relation Age of Onset    Lung Disease Mother         lung cancer    Hypertension Mother     Heart Failure Mother     Hypertension Father     Heart Failure Father      Social History     Tobacco Use    Smoking status: Never    Smokeless tobacco: Never   Substance Use Topics    Alcohol use: Estephania Craft MD           Simone Christina is a 80 y.o. male and presents with Follow Up Chronic Condition (6 mo fu) and Annual Wellness Visit  . Subjective:  Mr. Niesha Walker presents today for 6-month follow-up and Medicare annual wellness review. He is doing well on his current medical regimen. He remains on amlodipine 5 mg daily for hypertension. He uses a muscle relaxer and takes Tylenol as needed for back pain. He remains on gabapentin for back pain with radiculopathy. He had back surgery several years ago. He has no shortness of breath, chest pain, palpitations, PND, orthopnea, or pedal edema.     Past Medical History:   Diagnosis Date    Allergic rhinitis 12/15/2017    Arthritis of knee, left 12/15/2017    Aseptic meningitis     Chronic pain     chronic back pain    Colon polyps     Constipation 12/15/2017    Dysphagia 12/15/2017    Elevated blood pressure reading 12/15/2017    Fatigue 12/15/2017    Headache 12/15/2017    Hematuria 12/15/2017    Herpes zoster dermatitis 12/15/2017    Hyperkalemia 12/15/2017    Hypertension 12/15/2017    Insomnia 12/15/2017    Osteoarthritis 12/15/2017    Otitis externa 12/15/2017    Prostate cancer screening 12/15/2017    Right groin hernia     Sciatica 12/15/2017    Sciatica of left side associated with disorder of lumbosacral spine 12/15/2017    Shingles     Spinal stenosis 12/15/2017     Past Surgical History:   Procedure Laterality Date    COLONOSCOPY N/A 11/29/2018    COLONOSCOPY performed by Marsha Valles MD at [facility] ENDOSCOPY    COLONOSCOPY,DIAGNOSTIC  11/11/2014         COLONOSCOPY,DIAGNOSTIC  11/29/2018         HX CATARACT REMOVAL Bilateral     with lens implants    HX COLONOSCOPY      HX HERNIA REPAIR Left     HX HERNIA REPAIR  06/05/2019    HX ORTHOPAEDIC  2008    rods placed in back    HX ORTHOPAEDIC  10/6/15    LEFT L5-S1 MICRODISCECTOMY     HX ORTHOPAEDIC  08/2018    glynn replacement, by Dr. Hipolito Gallo SURGICAL      steroid injection for back pain    HX TONSILLECTOMY      UT EGD INSERT GUIDE WIRE DILATOR PASSAGE ESOPHAGUS  10/17/2013         [facility] EGD TRANSORAL BIOPSY SINGLE/MULTIPLE  10/17/2013          No Known Allergies  Current Outpatient Medications   Medication Sig Dispense Refill    gabapentin (NEURONTIN) 400 mg capsule TAKE 1 CAPSULE BY MOUTH THREE TIMES A DAY 90 Capsule 2    amLODIPine (NORVASC) 5 mg tablet TAKE 1 TABLET BY MOUTH EVERY DAY 30 Tablet 5    tiZANidine (ZANAFLEX) 4 mg tablet TAKE 1 TABLET BY MOUTH EVERY DAY EVERY NIGHT 30 Tablet 5    ALPRAZolam (XANAX) 0.5 mg tablet TAKE 1 TABLET BY MOUTH THREE TIMES A DAY AS NEEDED FOR ANXIETY 90 Tablet 2    polyethylene glycol (MIRALAX) 17 gram/dose powder Take 17 g by mouth daily. (Patient taking differently: Take 17 g by mouth as needed.) 510 g 0    Wheat Dextrin 3 gram/3.5 gram pwpk Take 1 Each by mouth daily. vit C/E/Zn/coppr/lutein/zeaxan (PRESERVISION AREDS 2 PO) Take 1 Tab by mouth two (2) times a day. acetaminophen (TYLENOL) 325 mg tablet Take 650 mg by mouth every four (4) hours as needed for Pain. Social History     Socioeconomic History    Marital status:    Tobacco Use    Smoking status: Never    Smokeless tobacco: Never   Vaping Use    Vaping Use: Never used   Substance and Sexual Activity    Alcohol use: Never    Drug use: Never     Family History   Problem Relation Age of Onset    Lung Disease Mother         lung cancer    Hypertension Mother     Heart Failure Mother     Hypertension Father     Heart Failure Father        Review of Systems  Constitutional:  negative for fevers, chills, anorexia and weight loss  Eyes:    negative for visual disturbance and irritation  ENT:    negative for tinnitus,sore throat,nasal congestion,ear pains. hoarseness  Respiratory:     negative for cough, hemoptysis, dyspnea,wheezing  CV:    negative for chest pain, palpitations, lower extremity edema  GI:    negative for nausea, vomiting, diarrhea, abdominal pain,melena  Endo:               negative for polyuria,polydipsia,polyphagia,heat intolerance  Genitourinary : negative for frequency, dysuria and hematuria  Integumentary: negative for rash and pruritus  Hematologic:   negative for easy bruising and gum/nose bleeding  Musculoskel:  negative for myalgias, arthralgias, muscle weakness  Neurological:   negative for headaches, dizziness, vertigo, memory problems and gait   Behavl/Psych:  negative for feelings of anxiety, depression, mood changes  ROS otherwise negative      Objective:  Visit Vitals  /78 (BP 1 Location: Left upper arm, BP Patient Position: Sitting, BP Cuff Size: Large adult)   Pulse 71   Temp 98 °F (36.7 °C) (Oral)   Resp 18   Ht 5' 9\" (1.753 m)   Wt 184 lb 3.2 oz (83.6 kg)   SpO2 96%   BMI 27.20 kg/m²     Physical Exam:   General appearance - alert, well appearing, and in no distress  Mental status - alert, oriented to person, place, and time  EYE-TRACY, EOMI, fundi normal, corneas normal, no foreign bodies  ENT-ENT exam normal, no neck nodes or sinus tenderness  Nose - normal and patent, no erythema, discharge or polyps  Mouth - mucous membranes moist, pharynx normal without lesions  Neck - supple, no significant adenopathy   Chest - clear to auscultation, no wheezes, rales or rhonchi, symmetric air entry   Heart - normal rate, regular rhythm, normal S1, S2, no murmurs, rubs, clicks or gallops   Abdomen - soft, nontender, nondistended, no masses or organomegaly  Lymph- no adenopathy palpable  Ext-peripheral pulses normal, no pedal edema, no clubbing or cyanosis  Skin-Warm and dry. no hyperpigmentation, vitiligo, or suspicious lesions  Neuro -alert, oriented, normal speech, no focal findings or movement disorder noted      Assessment/Plan:  Diagnoses and all orders for this visit:    1. Medicare annual wellness visit, subsequent    2. Primary hypertension    3. S/P lumbar laminectomy    4. Anxiety    5.  Osteoarthritis of spine with radiculopathy, lumbar region    6. Advanced directives, counseling/discussion    7. Screening for diabetes mellitus    8. Screening for ischemic heart disease    9. Screening for depression  -     [address]    10. Osteopenia, unspecified location  -     DEXA BONE DENSITY STUDY AXIAL; Future    11. Other specified disorders of bone density and structure, multiple sites   -     DEXA BONE DENSITY STUDY AXIAL; Future        ICD-10-CM ICD-9-CM    1. Medicare annual wellness visit, subsequent  Z00.00 V70.0       2. Primary hypertension  I10 401.9       3. S/P lumbar laminectomy  Z98.890 V45.89       4. Anxiety  F41.9 300.00       5. Osteoarthritis of spine with radiculopathy, lumbar region  M47.26 721.3       6. Advanced directives, counseling/discussion  Z71.89 V65.49       7. Screening for diabetes mellitus  Z13.1 V77.1       8. Screening for ischemic heart disease  Z13.6 V81.0       9. Screening for depression  Z13.31 V79.0 [address]      10. Osteopenia, unspecified location  M85.80 733.90 DEXA BONE DENSITY STUDY AXIAL      11. Other specified disorders of bone density and structure, multiple sites   M85.89 733.99 DEXA BONE DENSITY STUDY AXIAL        Plan:    Continue current medical regimen as outlined above. Labs were reviewed with the patient today which are stable. He and in addition he had [facility] screening which showed no significant abnormalities other than some mild bone loss and he will be scheduled for a bone density on return. I have reviewed with the patient details of the assessment and plan and all questions were answered. Relevent patient education was performed. Verbal and/or written instructions (see AVS) provided. The most recent lab findings were reviewed with the patient. Plan was discussed with patient who verbally expressed understanding. An After Visit Summary was printed and given to the patient.     Jus Ritter MD

## 2022-12-30 NOTE — PATIENT INSTRUCTIONS
Medicare Wellness Visit, Male    The best way to live healthy is to have a lifestyle where you eat a well-balanced diet, exercise regularly, limit alcohol use, and quit all forms of tobacco/nicotine, if applicable. Regular preventive services are another way to keep healthy. Preventive services (vaccines, screening tests, monitoring & exams) can help personalize your care plan, which helps you manage your own care. Screening tests can find health problems at the earliest stages, when they are easiest to treat. [facility] follows the current, evidence-based guidelines published by the [facility] ([facility]) when recommending preventive services for our patients. Because we follow these guidelines, sometimes recommendations change over time as research supports it. (For example, a prostate screening blood test is no longer routinely recommended for men with no symptoms). Of course, you and your doctor may decide to screen more often for some diseases, based on your risk and co-morbidities (chronic disease you are already diagnosed with). Preventive services for you include:  - Medicare offers their members a free annual wellness visit, which is time for you and your primary care provider to discuss and plan for your preventive service needs.  Take advantage of this benefit every year!    -Over the age of 72 should receive the recommended pneumonia vaccines.   -All adults should have a flu vaccine yearly.  -All adults should receive a tetanus vaccine every 10 years.  -Over the age of 48 should receive the shingles vaccines.    -All adults should be screened once for Hepatitis C.  -All adults age 38-68 who are overweight should have a diabetes screening test once every three years.   -Other screening tests & preventive services for persons with diabetes include: an eye exam to screen for diabetic retinopathy, a kidney function test, a foot exam, and stricter control over your cholesterol.   -Cardiovascular screening for adults with routine risk involves an electrocardiogram (ECG) at intervals determined by the provider.     -Colorectal cancer screening should be done for adults age 43-69 with no increased risk factors for colorectal cancer. There are a number of acceptable methods of screening for this type of cancer. Each test has its own benefits and drawbacks. Discuss with your provider what is most appropriate for you during your annual wellness visit. The different tests include: colonoscopy (considered the best screening method), a fecal occult blood test, a fecal DNA test, and sigmoidoscopy.    -Lung cancer screening is recommended annually with a low dose CT scan for adults between age 54 and 68, who have smoked at least 30 pack years (equivalent of 1 pack per day for 30 days), and who is a current smoker or quit less than 15 years ago. -An Abdominal Aortic Aneurysm (AAA) Screening is recommended for men age 73-68 who has ever smoked in their lifetime.      Here is a list of your current Health Maintenance items (your personalized list of preventive services) with a due date:  Health Maintenance Due   Topic Date Due    DTaP/Tdap/Td  (1 - Tdap) Never done    Shingles Vaccine (1 of 2) Never done    Pneumococcal Vaccine (1 - PCV) Never done    COVID-19 Vaccine (3 - Booster for Pfizer series) 07/17/2021

## 2023-02-27 DIAGNOSIS — F41.9 ANXIETY: ICD-10-CM

## 2023-02-27 RX ORDER — ALPRAZOLAM 0.5 MG/1
TABLET ORAL
Qty: 90 TABLET | Refills: 2 | Status: SHIPPED | OUTPATIENT
Start: 2023-02-27 | End: 2023-03-03 | Stop reason: SDUPTHER

## 2023-03-03 DIAGNOSIS — F41.9 ANXIETY: ICD-10-CM

## 2023-03-03 RX ORDER — ALPRAZOLAM 0.5 MG/1
TABLET ORAL
Qty: 90 TABLET | Refills: 2 | Status: SHIPPED | OUTPATIENT
Start: 2023-03-03

## 2023-03-03 NOTE — TELEPHONE ENCOUNTER
PCP: Courtney Garsia MD    Last appt: 12/30/2022  Future Appointments   Date Time Provider Diane Fisher   7/11/2023 11:00 AM Courtney Garsia MD [facility] AMB       Requested Prescriptions     Pending Prescriptions Disp Refills    ALPRAZolam (XANAX) 0.5 mg tablet 90 Tablet 2     Sig: TAKE 1 TABLET BY MOUTH THREE TIMES A DAY AS NEEDED FOR ANXIETY       Prior labs and Blood pressures:  BP Readings from Last 3 Encounters:   12/30/22 132/78   06/29/22 (!) 148/86   12/29/21 138/78     Lab Results   Component Value Date/Time    Sodium 142 12/23/2022 08:28 AM    Potassium 3.8 12/23/2022 08:28 AM    Chloride 109 (H) 12/23/2022 08:28 AM    CO2 31 12/23/2022 08:28 AM    Anion gap 2 (L) 12/23/2022 08:28 AM    Glucose 100 12/23/2022 08:28 AM    BUN 14 12/23/2022 08:28 AM    Creatinine 0.83 12/23/2022 08:28 AM    BUN/Creatinine ratio 17 12/23/2022 08:28 AM    GFR est AA >60 12/23/2021 08:13 AM    GFR est non-AA >60 12/23/2021 08:13 AM    Calcium 8.7 12/23/2022 08:28 AM

## 2023-05-04 RX ORDER — TIZANIDINE 4 MG/1
TABLET ORAL
Qty: 30 TABLET | Refills: 5 | Status: SHIPPED | OUTPATIENT
Start: 2023-05-04

## 2023-06-05 NOTE — TELEPHONE ENCOUNTER
Last Refill: 12-6-22  Last Visit: 12/30/2022   Next Visit: 7/11/2023     Requested Prescriptions     Pending Prescriptions Disp Refills    amLODIPine (NORVASC) 5 MG tablet [Pharmacy Med Name: AMLODIPINE BESYLATE 5 MG TAB] 30 tablet 5     Sig: TAKE 1 TABLET BY MOUTH EVERY DAY

## 2023-06-06 RX ORDER — AMLODIPINE BESYLATE 5 MG/1
TABLET ORAL
Qty: 90 TABLET | Refills: 1 | Status: SHIPPED | OUTPATIENT
Start: 2023-06-06

## 2023-07-11 ENCOUNTER — OFFICE VISIT (OUTPATIENT)
Facility: CLINIC | Age: 88
End: 2023-07-11
Payer: MEDICARE

## 2023-07-11 VITALS
OXYGEN SATURATION: 96 % | HEART RATE: 69 BPM | TEMPERATURE: 97.7 F | BODY MASS INDEX: 27.19 KG/M2 | WEIGHT: 183.6 LBS | SYSTOLIC BLOOD PRESSURE: 136 MMHG | HEIGHT: 69 IN | RESPIRATION RATE: 18 BRPM | DIASTOLIC BLOOD PRESSURE: 76 MMHG

## 2023-07-11 DIAGNOSIS — Z13.6 ENCOUNTER FOR SCREENING FOR CARDIOVASCULAR DISORDERS: ICD-10-CM

## 2023-07-11 DIAGNOSIS — R53.83 FATIGUE, UNSPECIFIED TYPE: ICD-10-CM

## 2023-07-11 DIAGNOSIS — M47.26 OSTEOARTHRITIS OF SPINE WITH RADICULOPATHY, LUMBAR REGION: ICD-10-CM

## 2023-07-11 DIAGNOSIS — I10 PRIMARY HYPERTENSION: Primary | ICD-10-CM

## 2023-07-11 PROCEDURE — G8419 CALC BMI OUT NRM PARAM NOF/U: HCPCS | Performed by: INTERNAL MEDICINE

## 2023-07-11 PROCEDURE — G8427 DOCREV CUR MEDS BY ELIG CLIN: HCPCS | Performed by: INTERNAL MEDICINE

## 2023-07-11 PROCEDURE — 99213 OFFICE O/P EST LOW 20 MIN: CPT | Performed by: INTERNAL MEDICINE

## 2023-07-11 PROCEDURE — 1123F ACP DISCUSS/DSCN MKR DOCD: CPT | Performed by: INTERNAL MEDICINE

## 2023-07-11 PROCEDURE — 1036F TOBACCO NON-USER: CPT | Performed by: INTERNAL MEDICINE

## 2023-07-11 NOTE — PROGRESS NOTES
Yaneth Noyola is a 80 y.o. male presenting for 6 Month Follow-Up  . 1. Have you been to the ER, urgent care clinic since your last visit? Hospitalized since your last visit? No    2. Have you seen or consulted any other health care providers outside of the [address] [place] [address] Avenue since your last visit? Include any pap smears or colon screening.   Dentist
palpable  Ext-peripheral pulses normal, no pedal edema, no clubbing or cyanosis  Skin-Warm and dry. no hyperpigmentation, vitiligo, or suspicious lesions  Neuro -alert, oriented, normal speech, no focal findings or movement disorder noted      Assessment/Plan:  Fahad Whaley was seen today for 6 month follow-up. Diagnoses and all orders for this visit:    Primary hypertension  -     Comprehensive Metabolic Panel; Future  -     Urinalysis; Future    Osteoarthritis of spine with radiculopathy, lumbar region    Encounter for screening for cardiovascular disorders  -     Lipid Panel; Future    Fatigue, unspecified type  -     CBC with Auto Differential; Future          ICD-10-CM    1. Primary hypertension  I10 Comprehensive Metabolic Panel     Urinalysis      2. Osteoarthritis of spine with radiculopathy, lumbar region  M47.26       3. Encounter for screening for cardiovascular disorders  Z13.6 Lipid Panel      4. Fatigue, unspecified type  R53.83 CBC with Auto Differential      Plan:    Continue current medical regimen as outlined above. Consider orthopedic referral for trigger finger if this becomes more problematic. His headaches tend to be brief and fleeting and I suspect these are more muscular in nature. If they become more problematic he will follow-up for evaluation. Follow-up and Dispositions    Return in about 6 months (around 1/11/2024) for [address] [place] [address]. I have reviewed with the patient details of the assessment and plan and all questions were answered. Relevent patient education was performed. Verbal and/or written instructions (see AVS) provided. The most recent lab findings were reviewed with the patient. Plan was discussed with patient who verbally expressed understanding. An After Visit Summary was printed and given to the patient.     Brian Parr MD

## 2023-09-17 DIAGNOSIS — M48.061 SPINAL STENOSIS, LUMBAR REGION WITHOUT NEUROGENIC CLAUDICATION: ICD-10-CM

## 2023-09-18 RX ORDER — GABAPENTIN 400 MG/1
CAPSULE ORAL
Qty: 90 CAPSULE | Refills: 0 | Status: SHIPPED | OUTPATIENT
Start: 2023-09-18 | End: 2023-12-17

## 2023-09-18 NOTE — TELEPHONE ENCOUNTER
Last Refill: 6-16-23  Last Visit: 7/11/2023   Next Visit: 1/10/2024     Requested Prescriptions     Pending Prescriptions Disp Refills    gabapentin (NEURONTIN) 400 MG capsule [Pharmacy Med Name: GABAPENTIN 400 MG CAPSULE] 90 capsule 0     Sig: TAKE 1 CAPSULE BY MOUTH THREE TIMES A DAY

## 2023-11-06 RX ORDER — TIZANIDINE 4 MG/1
TABLET ORAL
Qty: 30 TABLET | Refills: 5 | Status: SHIPPED | OUTPATIENT
Start: 2023-11-06

## 2023-11-06 NOTE — TELEPHONE ENCOUNTER
Last Refill: 11-9-22  Last Visit: 7/11/2023   Next Visit: 1/10/2024     Requested Prescriptions     Pending Prescriptions Disp Refills    tiZANidine (Ang Quintana) 4 MG tablet [Pharmacy Med Name: TIZANIDINE HCL 4 MG TABLET] 30 tablet 5     Sig: TAKE 1 TABLET BY MOUTH EVERY DAY EVERY NIGHT

## 2023-11-07 DIAGNOSIS — F41.9 ANXIETY DISORDER, UNSPECIFIED: ICD-10-CM

## 2023-11-07 NOTE — TELEPHONE ENCOUNTER
PCP: Domitila Zamora MD    Last appt: 7/11/2023    Future Appointments   Date Time Provider [address]   1/10/2024  8:00 AM LAB ONLY ESTEE GONSALEZ   1/17/2024  8:00 AM MD ESTEE Dubon       Requested Prescriptions     Pending Prescriptions Disp Refills    ALPRAZolam (XANAX) 0.5 MG tablet [Pharmacy Med Name: ALPRAZOLAM 0.5 MG TABLET] 90 tablet 1     Sig: TAKE 1 TABLET BY MOUTH THREE TIMES A DAY AS NEEDED FOR ANXIETY

## 2023-11-08 RX ORDER — ALPRAZOLAM 0.5 MG/1
TABLET ORAL
Qty: 90 TABLET | Refills: 0 | Status: SHIPPED | OUTPATIENT
Start: 2023-11-08 | End: 2023-12-08

## 2023-12-12 NOTE — TELEPHONE ENCOUNTER
Last Refill: 6-6-23  Last Visit: 7/11/2023   Next Visit: 1/10/2024     Requested Prescriptions     Pending Prescriptions Disp Refills    amLODIPine (NORVASC) 5 MG tablet [Pharmacy Med Name: AMLODIPINE BESYLATE 5 MG TAB] 30 tablet 5     Sig: TAKE 1 TABLET BY MOUTH EVERY DAY

## 2023-12-13 RX ORDER — AMLODIPINE BESYLATE 5 MG/1
TABLET ORAL
Qty: 90 TABLET | Refills: 3 | Status: SHIPPED | OUTPATIENT
Start: 2023-12-13

## 2024-01-10 ENCOUNTER — NURSE ONLY (OUTPATIENT)
Facility: CLINIC | Age: 89
End: 2024-01-10

## 2024-01-10 DIAGNOSIS — Z13.6 ENCOUNTER FOR SCREENING FOR CARDIOVASCULAR DISORDERS: ICD-10-CM

## 2024-01-10 DIAGNOSIS — R53.83 FATIGUE, UNSPECIFIED TYPE: ICD-10-CM

## 2024-01-10 DIAGNOSIS — I10 PRIMARY HYPERTENSION: ICD-10-CM

## 2024-01-10 LAB
APPEARANCE UR: CLEAR
BASOPHILS # BLD: 0 K/UL (ref 0–0.1)
BASOPHILS NFR BLD: 1 % (ref 0–1)
BILIRUB UR QL: NEGATIVE
COLOR UR: NORMAL
DIFFERENTIAL METHOD BLD: ABNORMAL
EOSINOPHIL # BLD: 0.1 K/UL (ref 0–0.4)
EOSINOPHIL NFR BLD: 2 % (ref 0–7)
ERYTHROCYTE [DISTWIDTH] IN BLOOD BY AUTOMATED COUNT: 12.5 % (ref 11.5–14.5)
GLUCOSE UR STRIP.AUTO-MCNC: NEGATIVE MG/DL
HCT VFR BLD AUTO: 41.4 % (ref 36.6–50.3)
HGB BLD-MCNC: 13.2 G/DL (ref 12.1–17)
HGB UR QL STRIP: NEGATIVE
IMM GRANULOCYTES # BLD AUTO: 0 K/UL (ref 0–0.04)
IMM GRANULOCYTES NFR BLD AUTO: 0 % (ref 0–0.5)
KETONES UR QL STRIP.AUTO: NEGATIVE MG/DL
LEUKOCYTE ESTERASE UR QL STRIP.AUTO: NEGATIVE
LYMPHOCYTES # BLD: 2 K/UL (ref 0.8–3.5)
LYMPHOCYTES NFR BLD: 35 % (ref 12–49)
MCH RBC QN AUTO: 31.8 PG (ref 26–34)
MCHC RBC AUTO-ENTMCNC: 31.9 G/DL (ref 30–36.5)
MCV RBC AUTO: 99.8 FL (ref 80–99)
MONOCYTES # BLD: 0.5 K/UL (ref 0–1)
MONOCYTES NFR BLD: 9 % (ref 5–13)
NEUTS SEG # BLD: 3.1 K/UL (ref 1.8–8)
NEUTS SEG NFR BLD: 53 % (ref 32–75)
NITRITE UR QL STRIP.AUTO: NEGATIVE
NRBC # BLD: 0 K/UL (ref 0–0.01)
NRBC BLD-RTO: 0 PER 100 WBC
PH UR STRIP: 6.5 (ref 5–8)
PLATELET # BLD AUTO: 201 K/UL (ref 150–400)
PMV BLD AUTO: 10.3 FL (ref 8.9–12.9)
PROT UR STRIP-MCNC: NEGATIVE MG/DL
RBC # BLD AUTO: 4.15 M/UL (ref 4.1–5.7)
SP GR UR REFRACTOMETRY: 1.01 (ref 1–1.03)
UROBILINOGEN UR QL STRIP.AUTO: 0.2 EU/DL (ref 0.2–1)
WBC # BLD AUTO: 5.8 K/UL (ref 4.1–11.1)

## 2024-01-11 DIAGNOSIS — M48.061 SPINAL STENOSIS, LUMBAR REGION WITHOUT NEUROGENIC CLAUDICATION: ICD-10-CM

## 2024-01-11 LAB
ALBUMIN SERPL-MCNC: 4 G/DL (ref 3.5–5)
ALBUMIN/GLOB SERPL: 1.6 (ref 1.1–2.2)
ALP SERPL-CCNC: 78 U/L (ref 45–117)
ALT SERPL-CCNC: 15 U/L (ref 12–78)
ANION GAP SERPL CALC-SCNC: 3 MMOL/L (ref 5–15)
AST SERPL-CCNC: 11 U/L (ref 15–37)
BILIRUB SERPL-MCNC: 0.7 MG/DL (ref 0.2–1)
BUN SERPL-MCNC: 14 MG/DL (ref 6–20)
BUN/CREAT SERPL: 16 (ref 12–20)
CALCIUM SERPL-MCNC: 8.8 MG/DL (ref 8.5–10.1)
CHLORIDE SERPL-SCNC: 106 MMOL/L (ref 97–108)
CHOLEST SERPL-MCNC: 207 MG/DL
CO2 SERPL-SCNC: 31 MMOL/L (ref 21–32)
CREAT SERPL-MCNC: 0.85 MG/DL (ref 0.7–1.3)
GLOBULIN SER CALC-MCNC: 2.5 G/DL (ref 2–4)
GLUCOSE SERPL-MCNC: 98 MG/DL (ref 65–100)
HDLC SERPL-MCNC: 53 MG/DL
HDLC SERPL: 3.9 (ref 0–5)
LDLC SERPL CALC-MCNC: 135 MG/DL (ref 0–100)
POTASSIUM SERPL-SCNC: 4 MMOL/L (ref 3.5–5.1)
PROT SERPL-MCNC: 6.5 G/DL (ref 6.4–8.2)
SODIUM SERPL-SCNC: 140 MMOL/L (ref 136–145)
TRIGL SERPL-MCNC: 95 MG/DL
VLDLC SERPL CALC-MCNC: 19 MG/DL

## 2024-01-11 RX ORDER — GABAPENTIN 400 MG/1
CAPSULE ORAL
Qty: 90 CAPSULE | Refills: 2 | Status: SHIPPED | OUTPATIENT
Start: 2024-01-11 | End: 2024-04-10

## 2024-01-11 NOTE — TELEPHONE ENCOUNTER
Last Refill: 10-16-23  Last Visit: 7/11/2023   Next Visit: 1/17/2024     Requested Prescriptions     Pending Prescriptions Disp Refills    gabapentin (NEURONTIN) 400 MG capsule [Pharmacy Med Name: GABAPENTIN 400 MG CAPSULE] 90 capsule 2     Sig: TAKE 1 CAPSULE BY MOUTH THREE TIMES A DAY

## 2024-01-17 ENCOUNTER — OFFICE VISIT (OUTPATIENT)
Facility: CLINIC | Age: 89
End: 2024-01-17
Payer: MEDICARE

## 2024-01-17 VITALS
TEMPERATURE: 98.1 F | BODY MASS INDEX: 28.11 KG/M2 | WEIGHT: 189.8 LBS | HEIGHT: 69 IN | OXYGEN SATURATION: 99 % | DIASTOLIC BLOOD PRESSURE: 78 MMHG | HEART RATE: 68 BPM | RESPIRATION RATE: 16 BRPM | SYSTOLIC BLOOD PRESSURE: 138 MMHG

## 2024-01-17 DIAGNOSIS — Z00.00 MEDICARE ANNUAL WELLNESS VISIT, SUBSEQUENT: ICD-10-CM

## 2024-01-17 DIAGNOSIS — I10 PRIMARY HYPERTENSION: Primary | ICD-10-CM

## 2024-01-17 DIAGNOSIS — M48.061 SPINAL STENOSIS OF LUMBAR REGION WITHOUT NEUROGENIC CLAUDICATION: ICD-10-CM

## 2024-01-17 PROCEDURE — 1123F ACP DISCUSS/DSCN MKR DOCD: CPT | Performed by: INTERNAL MEDICINE

## 2024-01-17 PROCEDURE — G0439 PPPS, SUBSEQ VISIT: HCPCS | Performed by: INTERNAL MEDICINE

## 2024-01-17 PROCEDURE — G8484 FLU IMMUNIZE NO ADMIN: HCPCS | Performed by: INTERNAL MEDICINE

## 2024-01-17 ASSESSMENT — PATIENT HEALTH QUESTIONNAIRE - PHQ9
SUM OF ALL RESPONSES TO PHQ QUESTIONS 1-9: 0
SUM OF ALL RESPONSES TO PHQ9 QUESTIONS 1 & 2: 0
1. LITTLE INTEREST OR PLEASURE IN DOING THINGS: 0
SUM OF ALL RESPONSES TO PHQ QUESTIONS 1-9: 0
2. FEELING DOWN, DEPRESSED OR HOPELESS: 0
SUM OF ALL RESPONSES TO PHQ QUESTIONS 1-9: 0
SUM OF ALL RESPONSES TO PHQ QUESTIONS 1-9: 0

## 2024-01-17 ASSESSMENT — LIFESTYLE VARIABLES
HOW MANY STANDARD DRINKS CONTAINING ALCOHOL DO YOU HAVE ON A TYPICAL DAY: PATIENT DOES NOT DRINK
HOW OFTEN DO YOU HAVE A DRINK CONTAINING ALCOHOL: NEVER

## 2024-01-17 NOTE — PATIENT INSTRUCTIONS
Visit Summary for your review.    Other Preventive Recommendations:    A preventive eye exam performed by an eye specialist is recommended every 1-2 years to screen for glaucoma; cataracts, macular degeneration, and other eye disorders.  A preventive dental visit is recommended every 6 months.  Try to get at least 150 minutes of exercise per week or 10,000 steps per day on a pedometer .  Order or download the FREE \"Exercise & Physical Activity: Your Everyday Guide\" from [employer]. Call 1-856.654.7599 or search [employer] online.  You need 9558-4101 mg of calcium and 9053-6517 IU of vitamin D per day. It is possible to meet your calcium requirement with diet alone, but a vitamin D supplement is usually necessary to meet this goal.  When exposed to the sun, use a sunscreen that protects against both UVA and UVB radiation with an SPF of 30 or greater. Reapply every 2 to 3 hours or after sweating, drying off with a towel, or swimming.  Always wear a seat belt when traveling in a car. Always wear a helmet when riding a bicycle or motorcycle.

## 2024-01-17 NOTE — PROGRESS NOTES
Medicare Annual Wellness Visit    Samy Hunter Jr is here for 6 Month Follow-Up and Medicare AW    Assessment & Plan   Primary hypertension  Spinal stenosis of lumbar region without neurogenic claudication  Medicare annual wellness visit, subsequent    Recommendations for Preventive Services Due: see orders and patient instructions/AVS.  Recommended screening schedule for the next 5-10 years is provided to the patient in written form: see Patient Instructions/AVS.     Return in 6 months (on 7/17/2024).     Subjective       Patient's complete Health Risk Assessment and screening values have been reviewed and are found in Flowsheets. The following problems were reviewed today and where indicated follow up appointments were made and/or referrals ordered.    Positive Risk Factor Screenings with Interventions:                Activity, Diet, and Weight:  On average, how many days per week do you engage in moderate to strenuous exercise (like a brisk walk)?: 0 days  On average, how many minutes do you engage in exercise at this level?: 0 min    Do you eat balanced/healthy meals regularly?: Yes    Body mass index is 28.03 kg/m².      Inactivity Interventions:  See AVS for additional education material                           Objective   Vitals:    01/17/24 0758   BP: 138/78   Site: Left Upper Arm   Position: Sitting   Cuff Size: Large Adult   Pulse: 68   Resp: 16   Temp: 98.1 °F (36.7 °C)   TempSrc: Oral   SpO2: 99%   Weight: 86.1 kg (189 lb 12.8 oz)   Height: 1.753 m (5' 9\")      Body mass index is 28.03 kg/m².               No Known Allergies  Prior to Visit Medications    Medication Sig Taking? Authorizing Provider   gabapentin (NEURONTIN) 400 MG capsule TAKE 1 CAPSULE BY MOUTH THREE TIMES A DAY Yes HETAL Lewis MD   amLODIPine (NORVASC) 5 MG tablet TAKE 1 TABLET BY MOUTH EVERY DAY Yes HETAL Lewis MD   tiZANidine (ZANAFLEX) 4 MG tablet TAKE 1 TABLET BY MOUTH EVERY DAY EVERY NIGHT Yes HETAL Lewis

## 2024-01-17 NOTE — PROGRESS NOTES
Samy Hunter Jr is a 88 y.o. male presenting for 6 Month Follow-Up and Medicare AWV  .     \"Have you been to the ER, urgent care clinic since your last visit?  Hospitalized since your last visit?\"    NO    “Have you seen or consulted any other health care providers outside of [facility] since your last visit?”    NO

## 2024-02-14 DIAGNOSIS — F41.9 ANXIETY DISORDER, UNSPECIFIED: ICD-10-CM

## 2024-02-15 RX ORDER — ALPRAZOLAM 0.5 MG/1
TABLET ORAL
Qty: 90 TABLET | Refills: 0 | Status: SHIPPED | OUTPATIENT
Start: 2024-02-15 | End: 2024-03-16

## 2024-02-15 NOTE — TELEPHONE ENCOUNTER
Last Refill: 11-8-23  Last Visit: 1/17/2024   Next Visit: 7/17/2024     Requested Prescriptions     Pending Prescriptions Disp Refills    ALPRAZolam (XANAX) 0.5 MG tablet [Pharmacy Med Name: ALPRAZOLAM 0.5 MG TABLET] 90 tablet 0     Sig: TAKE 1 TABLET BY MOUTH THREE TIMES A DAY AS NEEDED FOR ANXIETY

## 2024-03-06 ENCOUNTER — OFFICE VISIT (OUTPATIENT)
Facility: CLINIC | Age: 89
End: 2024-03-06
Payer: MEDICARE

## 2024-03-06 VITALS
TEMPERATURE: 98.1 F | DIASTOLIC BLOOD PRESSURE: 70 MMHG | WEIGHT: 189.4 LBS | OXYGEN SATURATION: 97 % | SYSTOLIC BLOOD PRESSURE: 128 MMHG | HEART RATE: 68 BPM | BODY MASS INDEX: 28.05 KG/M2 | HEIGHT: 69 IN | RESPIRATION RATE: 16 BRPM

## 2024-03-06 DIAGNOSIS — J01.00 ACUTE MAXILLARY SINUSITIS, RECURRENCE NOT SPECIFIED: Primary | ICD-10-CM

## 2024-03-06 PROCEDURE — 1123F ACP DISCUSS/DSCN MKR DOCD: CPT | Performed by: INTERNAL MEDICINE

## 2024-03-06 PROCEDURE — 1036F TOBACCO NON-USER: CPT | Performed by: INTERNAL MEDICINE

## 2024-03-06 PROCEDURE — 99213 OFFICE O/P EST LOW 20 MIN: CPT | Performed by: INTERNAL MEDICINE

## 2024-03-06 PROCEDURE — G8419 CALC BMI OUT NRM PARAM NOF/U: HCPCS | Performed by: INTERNAL MEDICINE

## 2024-03-06 PROCEDURE — G8484 FLU IMMUNIZE NO ADMIN: HCPCS | Performed by: INTERNAL MEDICINE

## 2024-03-06 PROCEDURE — G8427 DOCREV CUR MEDS BY ELIG CLIN: HCPCS | Performed by: INTERNAL MEDICINE

## 2024-03-06 RX ORDER — CEFDINIR 300 MG/1
300 CAPSULE ORAL 2 TIMES DAILY
Qty: 20 CAPSULE | Refills: 0 | Status: SHIPPED | OUTPATIENT
Start: 2024-03-06 | End: 2024-03-16

## 2024-03-06 NOTE — PROGRESS NOTES
Samy Hunter Jr is a 88 y.o. male presenting for Sinus Problem (Nose bleeds R nostril) and Headache  .     \"Have you been to the ER, urgent care clinic since your last visit?  Hospitalized since your last visit?\"    NO    “Have you seen or consulted any other health care providers outside of [facility] since your last visit?”    NO

## 2024-03-06 NOTE — PROGRESS NOTES
Samy Hunter Jr is a 88 y.o. male and presents with Sinus Problem (Nose bleeds R nostril) and Headache  .    Subjective:    Mr. Hunter presents today with complaint of sinus congestion and pressure.  He has a headache described as pressure in the frontal area that has been present for the past couple of days.  He initially developed some sinus congestion and drainage at the end of last week.  He experienced a significant nosebleed per his right nare.  This finally stopped and he has had some minor bloody discharge since then.  Drainage is clear to yellowish with tinge of blood intermittently.  He is not taking medication for this currently.  Past Medical History:   Diagnosis Date    Allergic rhinitis 12/15/2017    Arthritis of knee, left 12/15/2017    Aseptic meningitis     Chronic pain     chronic back pain    Colon polyps     Constipation 12/15/2017    Dysphagia 12/15/2017    Elevated blood pressure reading 12/15/2017    Fatigue 12/15/2017    Headache 12/15/2017    Hematuria 12/15/2017    Herpes zoster dermatitis 12/15/2017    Hyperkalemia 12/15/2017    Hypertension 12/15/2017    Insomnia 12/15/2017    Osteoarthritis 12/15/2017    Otitis externa 12/15/2017    Prostate cancer screening 12/15/2017    Right groin hernia     Sciatica 12/15/2017    Sciatica of left side associated with disorder of lumbosacral spine 12/15/2017    Shingles     Spinal stenosis 12/15/2017     Past Surgical History:   Procedure Laterality Date    CATARACT REMOVAL Bilateral     with lens implants    COLONOSCOPY      COLONOSCOPY N/A 11/29/2018    COLONOSCOPY performed by Julito Hamilton MD at [facility] ENDOSCOPY    COLONOSCOPY,DIAGNOSTIC  11/29/2018         COLONOSCOPY,DIAGNOSTIC  11/11/2014         EGD INSERT GUIDE WIRE DILATOR PASSAGE ESOPHAGUS  10/17/2013         EGD TRANSORAL BIOPSY SINGLE/MULTIPLE  10/17/2013         HERNIA REPAIR  06/05/2019    HERNIA REPAIR Left     ORTHOPEDIC SURGERY  08/2018    jose replacement, by Dr. Rosen

## 2024-04-05 DIAGNOSIS — M48.061 SPINAL STENOSIS, LUMBAR REGION WITHOUT NEUROGENIC CLAUDICATION: ICD-10-CM

## 2024-04-05 NOTE — TELEPHONE ENCOUNTER
PCP: HETAL Lewis MD    Last appt: 3/6/2024    Future Appointments   Date Time Provider Department Center   7/17/2024  3:00 PM HETAL Lewis MD [facility] AMB       Requested Prescriptions     Pending Prescriptions Disp Refills    gabapentin (NEURONTIN) 400 MG capsule [Pharmacy Med Name: GABAPENTIN 400 MG CAPSULE] 90 capsule 1     Sig: TAKE 1 CAPSULE BY MOUTH THREE TIMES A DAY

## 2024-04-08 RX ORDER — GABAPENTIN 400 MG/1
CAPSULE ORAL
Qty: 90 CAPSULE | Refills: 1 | Status: SHIPPED | OUTPATIENT
Start: 2024-04-08 | End: 2024-07-07

## 2024-04-16 ENCOUNTER — OFFICE VISIT (OUTPATIENT)
Facility: CLINIC | Age: 89
End: 2024-04-16
Payer: MEDICARE

## 2024-04-16 VITALS
BODY MASS INDEX: 27.73 KG/M2 | WEIGHT: 187.8 LBS | SYSTOLIC BLOOD PRESSURE: 168 MMHG | DIASTOLIC BLOOD PRESSURE: 55 MMHG | TEMPERATURE: 98.1 F | HEART RATE: 68 BPM | OXYGEN SATURATION: 94 %

## 2024-04-16 DIAGNOSIS — M25.572 ACUTE LEFT ANKLE PAIN: Primary | ICD-10-CM

## 2024-04-16 PROCEDURE — 1123F ACP DISCUSS/DSCN MKR DOCD: CPT | Performed by: INTERNAL MEDICINE

## 2024-04-16 PROCEDURE — G8427 DOCREV CUR MEDS BY ELIG CLIN: HCPCS | Performed by: INTERNAL MEDICINE

## 2024-04-16 PROCEDURE — G8419 CALC BMI OUT NRM PARAM NOF/U: HCPCS | Performed by: INTERNAL MEDICINE

## 2024-04-16 PROCEDURE — 99213 OFFICE O/P EST LOW 20 MIN: CPT | Performed by: INTERNAL MEDICINE

## 2024-04-16 PROCEDURE — 1036F TOBACCO NON-USER: CPT | Performed by: INTERNAL MEDICINE

## 2024-04-16 RX ORDER — INDOMETHACIN 50 MG/1
50 CAPSULE ORAL 3 TIMES DAILY PRN
Qty: 60 CAPSULE | Refills: 0 | Status: SHIPPED | OUTPATIENT
Start: 2024-04-16

## 2024-04-16 NOTE — PROGRESS NOTES
Chief Complaint   Patient presents with    Ankle Pain     Ankle pain    1. Have you been to the ER, urgent care clinic since your last visit?  Hospitalized since your last visit?no    2. Have you seen or consulted any other health care providers outside of the [facility] since your last visit?  Include any pap smears or colon screening.no

## 2024-04-16 NOTE — PROGRESS NOTES
Samy Hunter Jr is a 88 y.o. male and presents with Ankle Pain (Ankle pain)  .    Subjective:  Mr. Hunterpresents today with complaint of left ankle pain.  He states he was mowing the lawn on an incline about 3 weeks ago and started to develop some discomfort.  The pain has persisted over the past 3 weeks.  This is worse with weightbearing.  There has been no swelling.  He did wear a brace for this for bed although this caused some irritation of his ankle and he has some redness overlying the inner aspect of his left lower extremity.  He is not taking medication for this currently.  Past Medical History:   Diagnosis Date    Allergic rhinitis 12/15/2017    Arthritis of knee, left 12/15/2017    Aseptic meningitis     Chronic pain     chronic back pain    Colon polyps     Constipation 12/15/2017    Dysphagia 12/15/2017    Elevated blood pressure reading 12/15/2017    Fatigue 12/15/2017    Headache 12/15/2017    Hematuria 12/15/2017    Herpes zoster dermatitis 12/15/2017    Hyperkalemia 12/15/2017    Hypertension 12/15/2017    Insomnia 12/15/2017    Osteoarthritis 12/15/2017    Otitis externa 12/15/2017    Prostate cancer screening 12/15/2017    Right groin hernia     Sciatica 12/15/2017    Sciatica of left side associated with disorder of lumbosacral spine 12/15/2017    Shingles     Spinal stenosis 12/15/2017     Past Surgical History:   Procedure Laterality Date    CATARACT REMOVAL Bilateral     with lens implants    COLONOSCOPY      COLONOSCOPY N/A 11/29/2018    COLONOSCOPY performed by Julito Hamilton MD at [facility] ENDOSCOPY    COLONOSCOPY,DIAGNOSTIC  11/29/2018         COLONOSCOPY,DIAGNOSTIC  11/11/2014         EGD INSERT GUIDE WIRE DILATOR PASSAGE ESOPHAGUS  10/17/2013         EGD TRANSORAL BIOPSY SINGLE/MULTIPLE  10/17/2013         HERNIA REPAIR  06/05/2019    HERNIA REPAIR Left     ORTHOPEDIC SURGERY  08/2018    jose replacement, by Dr. Rosen    ORTHOPEDIC SURGERY  10/6/15    LEFT L5-S1 MICRODISCECTOMY

## 2024-05-01 RX ORDER — TIZANIDINE 4 MG/1
4 TABLET ORAL NIGHTLY
Qty: 30 TABLET | Refills: 5 | Status: SHIPPED | OUTPATIENT
Start: 2024-05-01

## 2024-05-01 NOTE — TELEPHONE ENCOUNTER
PCP: HETAL Lewis MD    Last appt: 4/16/2024    Future Appointments   Date Time Provider Department Center   7/17/2024  3:00 PM HETAL Lewis MD [facility] AMB       Requested Prescriptions     Pending Prescriptions Disp Refills    tiZANidine (ZANAFLEX) 4 MG tablet [Pharmacy Med Name: TIZANIDINE HCL 4 MG TABLET] 30 tablet 5     Sig: TAKE 1 TABLET BY MOUTH EVERY DAY AT NIGHT

## 2024-05-13 ENCOUNTER — TELEPHONE (OUTPATIENT)
Facility: CLINIC | Age: 89
End: 2024-05-13

## 2024-05-13 NOTE — TELEPHONE ENCOUNTER
Patient called stating his left ankle is still swollen and painful.  He was seen on 4/16/24.  Patient would like to get a referral to Ortho.  Please advise.

## 2024-05-14 DIAGNOSIS — M25.572 ACUTE LEFT ANKLE PAIN: Primary | ICD-10-CM

## 2024-05-15 DIAGNOSIS — F41.9 ANXIETY DISORDER, UNSPECIFIED: ICD-10-CM

## 2024-05-15 RX ORDER — ALPRAZOLAM 0.5 MG/1
TABLET ORAL
Qty: 90 TABLET | Refills: 0 | Status: SHIPPED | OUTPATIENT
Start: 2024-05-15 | End: 2024-06-14

## 2024-05-15 NOTE — TELEPHONE ENCOUNTER
PCP: HETAL Lewis MD    Last appt: 4/16/2024    Future Appointments   Date Time Provider Department Center   5/17/2024  1:30 PM Jordy Garcia DO [facility] AMB   7/17/2024  3:00 PM HETAL Lewis MD [facility] BS AMB       Requested Prescriptions     Pending Prescriptions Disp Refills    ALPRAZolam (XANAX) 0.5 MG tablet [Pharmacy Med Name: ALPRAZOLAM 0.5 MG TABLET] 90 tablet 0     Sig: TAKE 1 TABLET BY MOUTH THREE TIMES A DAY AS NEEDED FOR ANXIETY

## 2024-06-07 RX ORDER — INDOMETHACIN 50 MG/1
50 CAPSULE ORAL 3 TIMES DAILY PRN
Qty: 60 CAPSULE | Refills: 0 | Status: SHIPPED | OUTPATIENT
Start: 2024-06-07

## 2024-06-07 NOTE — TELEPHONE ENCOUNTER
PCP: HETAL Lewis MD    Last appt: 4/16/2024    Future Appointments   Date Time Provider Department Center   6/11/2024  9:40 AM Lubna Singer PT [facility] AMB   6/14/2024 10:40 AM Lubna Singer PT [facility] AMB   6/17/2024 10:20 AM Lubna Singer PT [facility] AMB   6/19/2024 10:00 AM Lubna Singer PT [facility] AMB   6/24/2024  9:00 AM Lubna Singer PT [facility] AMB   7/17/2024  3:00 PM HETAL Lewis MD [facility] AMB       Requested Prescriptions     Pending Prescriptions Disp Refills    indomethacin (INDOCIN) 50 MG capsule [Pharmacy Med Name: INDOMETHACIN 50 MG CAPSULE] 60 capsule 0     Sig: TAKE 1 CAPSULE BY MOUTH 3 TIMES DAILY AS NEEDED FOR PAIN.

## 2024-06-13 DIAGNOSIS — M48.061 SPINAL STENOSIS, LUMBAR REGION WITHOUT NEUROGENIC CLAUDICATION: ICD-10-CM

## 2024-06-13 NOTE — TELEPHONE ENCOUNTER
PCP: HETAL Lewis MD    Last appt: 4/16/2024    Future Appointments   Date Time Provider Department Center   6/14/2024 10:40 AM Lubna Singer PT [facility] AMB   6/17/2024 10:20 AM Lubna Singer PT [facility] AMB   6/19/2024 10:00 AM Lubna Singer PT [facility] AMB   6/24/2024  9:00 AM Lubna Singer PT [facility] AMB   7/17/2024  3:00 PM HETAL Lewis MD [facility] AMB       Requested Prescriptions     Pending Prescriptions Disp Refills    gabapentin (NEURONTIN) 400 MG capsule [Pharmacy Med Name: GABAPENTIN 400 MG CAPSULE] 90 capsule 1     Sig: TAKE 1 CAPSULE BY MOUTH THREE TIMES A DAY

## 2024-06-14 RX ORDER — GABAPENTIN 400 MG/1
CAPSULE ORAL
Qty: 90 CAPSULE | Refills: 1 | Status: SHIPPED | OUTPATIENT
Start: 2024-06-14 | End: 2024-09-12

## 2024-07-17 ENCOUNTER — OFFICE VISIT (OUTPATIENT)
Facility: CLINIC | Age: 89
End: 2024-07-17
Payer: MEDICARE

## 2024-07-17 VITALS
RESPIRATION RATE: 16 BRPM | HEIGHT: 69 IN | HEART RATE: 70 BPM | WEIGHT: 187.4 LBS | OXYGEN SATURATION: 98 % | TEMPERATURE: 97.6 F | BODY MASS INDEX: 27.76 KG/M2 | DIASTOLIC BLOOD PRESSURE: 72 MMHG | SYSTOLIC BLOOD PRESSURE: 146 MMHG

## 2024-07-17 DIAGNOSIS — N47.1 PHIMOSIS OF PENIS: Primary | ICD-10-CM

## 2024-07-17 DIAGNOSIS — I10 PRIMARY HYPERTENSION: ICD-10-CM

## 2024-07-17 DIAGNOSIS — M47.26 OSTEOARTHRITIS OF SPINE WITH RADICULOPATHY, LUMBAR REGION: ICD-10-CM

## 2024-07-17 DIAGNOSIS — Z98.890 S/P LUMBAR LAMINECTOMY: ICD-10-CM

## 2024-07-17 PROCEDURE — 1123F ACP DISCUSS/DSCN MKR DOCD: CPT | Performed by: INTERNAL MEDICINE

## 2024-07-17 PROCEDURE — 99214 OFFICE O/P EST MOD 30 MIN: CPT | Performed by: INTERNAL MEDICINE

## 2024-07-17 PROCEDURE — 1036F TOBACCO NON-USER: CPT | Performed by: INTERNAL MEDICINE

## 2024-07-17 PROCEDURE — G8427 DOCREV CUR MEDS BY ELIG CLIN: HCPCS | Performed by: INTERNAL MEDICINE

## 2024-07-17 PROCEDURE — G8419 CALC BMI OUT NRM PARAM NOF/U: HCPCS | Performed by: INTERNAL MEDICINE

## 2024-07-17 SDOH — ECONOMIC STABILITY: HOUSING INSECURITY
IN THE LAST 12 MONTHS, WAS THERE A TIME WHEN YOU DID NOT HAVE A STEADY PLACE TO SLEEP OR SLEPT IN A SHELTER (INCLUDING NOW)?: NO

## 2024-07-17 SDOH — ECONOMIC STABILITY: INCOME INSECURITY: HOW HARD IS IT FOR YOU TO PAY FOR THE VERY BASICS LIKE FOOD, HOUSING, MEDICAL CARE, AND HEATING?: NOT HARD AT ALL

## 2024-07-17 SDOH — ECONOMIC STABILITY: FOOD INSECURITY: WITHIN THE PAST 12 MONTHS, THE FOOD YOU BOUGHT JUST DIDN'T LAST AND YOU DIDN'T HAVE MONEY TO GET MORE.: NEVER TRUE

## 2024-07-17 SDOH — ECONOMIC STABILITY: FOOD INSECURITY: WITHIN THE PAST 12 MONTHS, YOU WORRIED THAT YOUR FOOD WOULD RUN OUT BEFORE YOU GOT MONEY TO BUY MORE.: NEVER TRUE

## 2024-07-17 NOTE — PROGRESS NOTES
Samy Hunter  is a 89 y.o. male and presents with 6 Month Follow-Up  .    Subjective:  Mr. Hunter presents today for 6-month follow-up for hypertension, history of lumbar laminectomy.  He is still having significant pain in his left ankle.  This has not responded to steroids or physical therapy.  Physical therapy felt this may be related to his back.  If his symptoms continue he may consider follow-up with orthopedics to reevaluate the hardware in his lumbar spine.  He remains on gabapentin 3 times daily.  He also notes that he has some mild discomfort and scarring of the tissue of his foreskin.  He states that he did not have a a circumcision until his 30s.  He has had difficulty with some scarring and has seen urology for this and was told there was nothing that could be done.  It appears that this has worsened and he has made a conscious effort to keep things clean and manage this but it has become more problematic.    Past Medical History:   Diagnosis Date    Allergic rhinitis 12/15/2017    Arthritis of knee, left 12/15/2017    Aseptic meningitis     Chronic pain     chronic back pain    Colon polyps     Constipation 12/15/2017    Dysphagia 12/15/2017    Elevated blood pressure reading 12/15/2017    Fatigue 12/15/2017    Headache 12/15/2017    Hematuria 12/15/2017    Herpes zoster dermatitis 12/15/2017    Hyperkalemia 12/15/2017    Hypertension 12/15/2017    Insomnia 12/15/2017    Osteoarthritis 12/15/2017    Otitis externa 12/15/2017    Prostate cancer screening 12/15/2017    Right groin hernia     Sciatica 12/15/2017    Sciatica of left side associated with disorder of lumbosacral spine 12/15/2017    Shingles     Spinal stenosis 12/15/2017     Past Surgical History:   Procedure Laterality Date    CATARACT REMOVAL Bilateral     with lens implants    COLONOSCOPY      COLONOSCOPY N/A 11/29/2018    COLONOSCOPY performed by Julito Hamilton MD at [facility] ENDOSCOPY    COLONOSCOPY,DIAGNOSTIC  11/29/2018

## 2024-07-17 NOTE — PROGRESS NOTES
Samy Hunter Jr is a 89 y.o. male     Chief Complaint   Patient presents with    6 Month Follow-Up       BP (!) 146/72 (Site: Left Upper Arm, Position: Sitting, Cuff Size: Medium Adult)   Pulse 70   Temp 97.6 °F (36.4 °C) (Oral)   Resp 16   Ht 1.753 m (5' 9\")   Wt 85 kg (187 lb 6.4 oz)   SpO2 98%   BMI 27.67 kg/m²     Health Maintenance Due   Topic Date Due    DTaP/Tdap/Td vaccine (1 - Tdap) Never done    Shingles vaccine (1 of 2) Never done    Respiratory Syncytial Virus (RSV) Pregnant or age 60 yrs+ (1 - 1-dose 60+ series) Never done    Pneumococcal 65+ years Vaccine (1 of 1 - PCV) Never done    COVID-19 Vaccine (3 - 2023-24 season) 09/01/2023         \"Have you been to the ER, urgent care clinic since your last visit?  Hospitalized since your last visit?\"    NO    “Have you seen or consulted any other health care providers outside of [facility] since your last visit?”    NO

## 2024-07-28 ENCOUNTER — HOSPITAL ENCOUNTER (OUTPATIENT)
Facility: HOSPITAL | Age: 89
Discharge: HOME | End: 2024-07-31
Attending: ORTHOPAEDIC SURGERY
Payer: MEDICARE

## 2024-07-28 DIAGNOSIS — M76.72 PERONEAL TENDINITIS OF LEFT LOWER LEG: ICD-10-CM

## 2024-07-28 DIAGNOSIS — M19.072 PRIMARY OSTEOARTHRITIS OF LEFT ANKLE: ICD-10-CM

## 2024-07-28 DIAGNOSIS — S93.402D MODERATE LEFT ANKLE SPRAIN, SUBSEQUENT ENCOUNTER: ICD-10-CM

## 2024-07-28 PROCEDURE — 73721 MRI JNT OF LWR EXTRE W/O DYE: CPT

## 2024-08-10 DIAGNOSIS — F41.9 ANXIETY DISORDER, UNSPECIFIED: ICD-10-CM

## 2024-08-10 DIAGNOSIS — M48.061 SPINAL STENOSIS, LUMBAR REGION WITHOUT NEUROGENIC CLAUDICATION: ICD-10-CM

## 2024-08-12 RX ORDER — GABAPENTIN 400 MG/1
400 CAPSULE ORAL 3 TIMES DAILY
Qty: 90 CAPSULE | Refills: 1 | Status: SHIPPED | OUTPATIENT
Start: 2024-08-12 | End: 2024-11-10

## 2024-08-12 RX ORDER — ALPRAZOLAM 0.5 MG/1
TABLET ORAL
Qty: 90 TABLET | Refills: 2 | Status: SHIPPED | OUTPATIENT
Start: 2024-08-12 | End: 2024-11-10

## 2024-08-12 NOTE — TELEPHONE ENCOUNTER
PCP: HETAL Lewis MD    Last appt: 7/17/2024    Future Appointments   Date Time Provider Department Center   8/14/2024  1:00 PM HETAL Lewis MD [facility]   1/13/2025  8:00 AM LAB ONLY [facility]   1/20/2025  3:15 PM HETAL Lewis MD [facility]       Requested Prescriptions     Pending Prescriptions Disp Refills    gabapentin (NEURONTIN) 400 MG capsule [Pharmacy Med Name: GABAPENTIN 400 MG CAPSULE] 90 capsule 1     Sig: Take 1 capsule by mouth 3 times daily.    ALPRAZolam (XANAX) 0.5 MG tablet [Pharmacy Med Name: ALPRAZOLAM 0.5 MG TABLET] 90 tablet 0     Sig: TAKE 1 TABLET BY MOUTH 3 TIMES A DAY AS NEEDED FOR ANXIETY

## 2024-08-14 ENCOUNTER — OFFICE VISIT (OUTPATIENT)
Facility: CLINIC | Age: 89
End: 2024-08-14
Payer: MEDICARE

## 2024-08-14 VITALS
WEIGHT: 187.8 LBS | DIASTOLIC BLOOD PRESSURE: 79 MMHG | TEMPERATURE: 98 F | RESPIRATION RATE: 18 BRPM | HEART RATE: 74 BPM | BODY MASS INDEX: 27.81 KG/M2 | OXYGEN SATURATION: 98 % | SYSTOLIC BLOOD PRESSURE: 164 MMHG | HEIGHT: 69 IN

## 2024-08-14 DIAGNOSIS — M47.26 OSTEOARTHRITIS OF SPINE WITH RADICULOPATHY, LUMBAR REGION: ICD-10-CM

## 2024-08-14 DIAGNOSIS — M77.52 TENDINITIS OF LEFT ANKLE: Primary | ICD-10-CM

## 2024-08-14 PROCEDURE — 1123F ACP DISCUSS/DSCN MKR DOCD: CPT | Performed by: INTERNAL MEDICINE

## 2024-08-14 PROCEDURE — 1036F TOBACCO NON-USER: CPT | Performed by: INTERNAL MEDICINE

## 2024-08-14 PROCEDURE — G8427 DOCREV CUR MEDS BY ELIG CLIN: HCPCS | Performed by: INTERNAL MEDICINE

## 2024-08-14 PROCEDURE — 99214 OFFICE O/P EST MOD 30 MIN: CPT | Performed by: INTERNAL MEDICINE

## 2024-08-14 PROCEDURE — G8419 CALC BMI OUT NRM PARAM NOF/U: HCPCS | Performed by: INTERNAL MEDICINE

## 2024-08-14 RX ORDER — PREDNISONE 20 MG/1
20 TABLET ORAL DAILY
Qty: 10 TABLET | Refills: 0 | Status: SHIPPED | OUTPATIENT
Start: 2024-08-14 | End: 2024-08-24

## 2024-08-14 NOTE — PROGRESS NOTES
Samy Hunter Jr is a 89 y.o. male and presents with discuss MRI (dr shah) left ankle  .    Subjective:  Mr. Hunter presents today with complaint of continued left ankle pain.  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  He has been limping on this ankle since March and it has remained problematic.  He is now having significant problems with his back.  He awakens at night about every 2 hours with discomfort and weakness of both lower extremities.  He is status post previous back surgery with hardware.    Past Medical History:   Diagnosis Date    Allergic rhinitis 12/15/2017    Arthritis of knee, left 12/15/2017    Aseptic meningitis     Chronic pain     chronic back pain    Colon polyps     Constipation 12/15/2017    Dysphagia 12/15/2017    Elevated blood pressure reading 12/15/2017    Fatigue 12/15/2017    Headache 12/15/2017    Hematuria 12/15/2017    Herpes zoster dermatitis 12/15/2017    Hyperkalemia 12/15/2017    Hypertension 12/15/2017    Insomnia 12/15/2017    Osteoarthritis 12/15/2017    Otitis externa 12/15/2017    Prostate cancer screening 12/15/2017    Right groin hernia     Sciatica 12/15/2017    Sciatica of left side associated with disorder of lumbosacral spine 12/15/2017    Shingles     Spinal stenosis 12/15/2017     Past Surgical History:   Procedure Laterality Date    CATARACT REMOVAL Bilateral     with lens implants    COLONOSCOPY      COLONOSCOPY N/A 11/29/2018    COLONOSCOPY performed by Julito Hamilton MD at [facility] ENDOSCOPY    COLONOSCOPY,DIAGNOSTIC  11/29/2018         COLONOSCOPY,DIAGNOSTIC  11/11/2014         EGD INSERT GUIDE WIRE DILATOR PASSAGE ESOPHAGUS  10/17/2013         EGD TRANSORAL BIOPSY SINGLE/MULTIPLE  10/17/2013         HERNIA REPAIR  06/05/2019    HERNIA REPAIR Left     ORTHOPEDIC SURGERY  08/2018    jose replacement, by Dr. Rosen    ORTHOPEDIC SURGERY  10/6/15    LEFT L5-S1 MICRODISCECTOMY     ORTHOPEDIC

## 2024-08-14 NOTE — PROGRESS NOTES
Samy Hunter Jr is a 89 y.o. male     Chief Complaint   Patient presents with    discuss MRI (dr shah) left ankle       BP (!) 164/79 (Site: Left Upper Arm, Position: Sitting, Cuff Size: Large Adult)   Pulse 74   Temp 98 °F (36.7 °C) (Temporal)   Resp 18   Ht 1.753 m (5' 9\")   Wt 85.2 kg (187 lb 12.8 oz)   SpO2 98%   BMI 27.73 kg/m²     Health Maintenance Due   Topic Date Due    DTaP/Tdap/Td vaccine (1 - Tdap) Never done    Shingles vaccine (1 of 2) Never done    Respiratory Syncytial Virus (RSV) Pregnant or age 60 yrs+ (1 - 1-dose 60+ series) Never done    Pneumococcal 65+ years Vaccine (1 of 1 - PCV) Never done    COVID-19 Vaccine (3 - 2023-24 season) 09/01/2023    Flu vaccine (1) Never done         \"Have you been to the ER, urgent care clinic since your last visit?  Hospitalized since your last visit?\"    NO    “Have you seen or consulted any other health care providers outside of [facility] since your last visit?”    NO

## 2024-09-24 ENCOUNTER — OFFICE VISIT (OUTPATIENT)
Facility: CLINIC | Age: 89
End: 2024-09-24
Payer: MEDICARE

## 2024-09-24 VITALS
RESPIRATION RATE: 19 BRPM | BODY MASS INDEX: 26.58 KG/M2 | OXYGEN SATURATION: 97 % | HEIGHT: 69 IN | SYSTOLIC BLOOD PRESSURE: 144 MMHG | HEART RATE: 64 BPM | TEMPERATURE: 98 F | DIASTOLIC BLOOD PRESSURE: 68 MMHG

## 2024-09-24 DIAGNOSIS — R19.8 CHANGE IN BOWEL MOVEMENT: Primary | ICD-10-CM

## 2024-09-24 PROCEDURE — 1123F ACP DISCUSS/DSCN MKR DOCD: CPT | Performed by: INTERNAL MEDICINE

## 2024-09-24 PROCEDURE — G8419 CALC BMI OUT NRM PARAM NOF/U: HCPCS | Performed by: INTERNAL MEDICINE

## 2024-09-24 PROCEDURE — 1036F TOBACCO NON-USER: CPT | Performed by: INTERNAL MEDICINE

## 2024-09-24 PROCEDURE — G8427 DOCREV CUR MEDS BY ELIG CLIN: HCPCS | Performed by: INTERNAL MEDICINE

## 2024-09-24 PROCEDURE — 99213 OFFICE O/P EST LOW 20 MIN: CPT | Performed by: INTERNAL MEDICINE

## 2024-10-10 DIAGNOSIS — M48.061 SPINAL STENOSIS, LUMBAR REGION WITHOUT NEUROGENIC CLAUDICATION: ICD-10-CM

## 2024-10-14 RX ORDER — GABAPENTIN 400 MG/1
400 CAPSULE ORAL 3 TIMES DAILY
Qty: 90 CAPSULE | Refills: 1 | Status: SHIPPED | OUTPATIENT
Start: 2024-10-14 | End: 2025-01-12

## 2024-10-14 NOTE — TELEPHONE ENCOUNTER
PCP: HETAL Lewis MD    Last appt: 9/24/2024    Future Appointments   Date Time Provider Department Center   1/13/2025  8:00 AM LAB ONLY [facility] DEP   1/20/2025  3:15 PM HETAL Lewis MD [facility] DEP       Requested Prescriptions     Pending Prescriptions Disp Refills    gabapentin (NEURONTIN) 400 MG capsule [Pharmacy Med Name: GABAPENTIN 400 MG CAPSULE] 90 capsule 1     Sig: Take 1 capsule by mouth 3 times daily for 90 days. Max Daily Amount: 1,200 mg

## 2024-10-30 NOTE — TELEPHONE ENCOUNTER
PCP: HETAL Lewis MD    Last appt: 9/24/2024    Future Appointments   Date Time Provider Department Center   1/13/2025  8:00 AM LAB ONLY [facility]   1/20/2025  3:15 PM HETAL Lewis MD [facility]       Requested Prescriptions     Pending Prescriptions Disp Refills    tiZANidine (ZANAFLEX) 4 MG tablet [Pharmacy Med Name: TIZANIDINE HCL 4 MG TABLET] 30 tablet 5     Sig: TAKE 1 TABLET BY MOUTH EVERY DAY AT NIGHT

## 2024-11-21 ENCOUNTER — OFFICE VISIT (OUTPATIENT)
Facility: CLINIC | Age: 89
End: 2024-11-21

## 2024-11-21 VITALS
HEART RATE: 75 BPM | OXYGEN SATURATION: 98 % | BODY MASS INDEX: 27.28 KG/M2 | DIASTOLIC BLOOD PRESSURE: 80 MMHG | HEIGHT: 69 IN | SYSTOLIC BLOOD PRESSURE: 142 MMHG | WEIGHT: 184.2 LBS | RESPIRATION RATE: 17 BRPM | TEMPERATURE: 98 F

## 2024-11-21 DIAGNOSIS — L03.012 PARONYCHIA OF FINGER OF LEFT HAND: Primary | ICD-10-CM

## 2024-11-21 RX ORDER — CEFUROXIME AXETIL 500 MG/1
500 TABLET ORAL 2 TIMES DAILY
Qty: 20 TABLET | Refills: 0 | Status: SHIPPED | OUTPATIENT
Start: 2024-11-21 | End: 2024-12-01

## 2024-11-21 NOTE — PROGRESS NOTES
Samy Hunter  is a 89 y.o. male and presents with Finger on left hand infected  .    Subjective:    Mr. Hunter presents today with complaint of swelling and pain of the first digit of the left hand.  This is been present now for over a week.  He has had no drainage.  He does not recall a injury or foreign object entering his finger.  Past Medical History:   Diagnosis Date    Allergic rhinitis 12/15/2017    Arthritis of knee, left 12/15/2017    Aseptic meningitis     Chronic pain     chronic back pain    Colon polyps     Constipation 12/15/2017    Dysphagia 12/15/2017    Elevated blood pressure reading 12/15/2017    Fatigue 12/15/2017    Headache 12/15/2017    Hematuria 12/15/2017    Herpes zoster dermatitis 12/15/2017    Hyperkalemia 12/15/2017    Hypertension 12/15/2017    Insomnia 12/15/2017    Osteoarthritis 12/15/2017    Otitis externa 12/15/2017    Prostate cancer screening 12/15/2017    Right groin hernia     Sciatica 12/15/2017    Sciatica of left side associated with disorder of lumbosacral spine 12/15/2017    Shingles     Spinal stenosis 12/15/2017     Past Surgical History:   Procedure Laterality Date    CATARACT REMOVAL Bilateral     with lens implants    COLONOSCOPY      COLONOSCOPY N/A 11/29/2018    COLONOSCOPY performed by Julito Hamliton MD at [facility] ENDOSCOPY    COLONOSCOPY,DIAGNOSTIC  11/29/2018         COLONOSCOPY,DIAGNOSTIC  11/11/2014         EGD INSERT GUIDE WIRE DILATOR PASSAGE ESOPHAGUS  10/17/2013         EGD TRANSORAL BIOPSY SINGLE/MULTIPLE  10/17/2013         HERNIA REPAIR  06/05/2019    HERNIA REPAIR Left     ORTHOPEDIC SURGERY  08/2018    jose replacement, by Dr. Rosen    ORTHOPEDIC SURGERY  10/6/15    LEFT L5-S1 MICRODISCECTOMY     ORTHOPEDIC SURGERY  2008    rods placed in back    OTHER SURGICAL HISTORY      steroid injection for back pain    TONSILLECTOMY       No Known Allergies  Current Outpatient Medications   Medication Sig Dispense Refill    cefUROXime (CEFTIN) 500 MG tablet

## 2024-11-21 NOTE — PROGRESS NOTES
Samy Hunter Jr is a 89 y.o. male     Chief Complaint   Patient presents with    Finger on left hand infected       BP (!) 142/80 (Site: Left Upper Arm, Position: Sitting, Cuff Size: Medium Adult)   Pulse 75   Temp 98 °F (36.7 °C) (Temporal)   Resp 17   Ht 1.753 m (5' 9\")   Wt 83.6 kg (184 lb 3.2 oz)   SpO2 98%   BMI 27.20 kg/m²     Health Maintenance Due   Topic Date Due    DTaP/Tdap/Td vaccine (1 - Tdap) Never done    Shingles vaccine (1 of 2) Never done    Pneumococcal 65+ years Vaccine (1 of 1 - PCV) Never done    Respiratory Syncytial Virus (RSV) Pregnant or age 60 yrs+ (1 - 1-dose 75+ series) Never done    Flu vaccine (1) 08/01/2024    COVID-19 Vaccine (4 - 2023-24 season) 09/01/2024         \"Have you been to the ER, urgent care clinic since your last visit?  Hospitalized since your last visit?\"    NO    “Have you seen or consulted any other health care providers outside of [facility] since your last visit?”    NO

## 2024-12-10 RX ORDER — AMLODIPINE BESYLATE 5 MG/1
TABLET ORAL
Qty: 30 TABLET | Refills: 3 | Status: SHIPPED | OUTPATIENT
Start: 2024-12-10

## 2024-12-10 NOTE — TELEPHONE ENCOUNTER
PCP: HETAL Lewis MD    Last appt: 11/21/2024    Future Appointments   Date Time Provider Department Center   1/13/2025  8:00 AM LAB ONLY [facility] DEP   1/20/2025  3:15 PM HETAL Lewis MD [facility] DEP       Requested Prescriptions     Pending Prescriptions Disp Refills    amLODIPine (NORVASC) 5 MG tablet [Pharmacy Med Name: AMLODIPINE BESYLATE 5 MG TAB] 30 tablet 3     Sig: TAKE 1 TABLET BY MOUTH EVERY DAY

## 2024-12-16 DIAGNOSIS — M48.061 SPINAL STENOSIS, LUMBAR REGION WITHOUT NEUROGENIC CLAUDICATION: ICD-10-CM

## 2024-12-17 RX ORDER — GABAPENTIN 400 MG/1
400 CAPSULE ORAL 3 TIMES DAILY
Qty: 90 CAPSULE | Refills: 1 | Status: SHIPPED | OUTPATIENT
Start: 2024-12-17 | End: 2025-03-17

## 2024-12-17 NOTE — TELEPHONE ENCOUNTER
PCP: HETAL Lewis MD    Last appt: 11/21/2024    Future Appointments   Date Time Provider Department Center   1/13/2025  8:00 AM LAB ONLY [facility] DEP   1/20/2025  3:15 PM HETAL Lewis MD [facility] DEP       Requested Prescriptions     Pending Prescriptions Disp Refills    gabapentin (NEURONTIN) 400 MG capsule [Pharmacy Med Name: GABAPENTIN 400 MG CAPSULE] 90 capsule 1     Sig: Take 1 capsule by mouth 3 times daily for 90 days. Max Daily Amount: 1,200 mg

## 2025-01-13 ENCOUNTER — LAB (OUTPATIENT)
Facility: CLINIC | Age: 89
End: 2025-01-13

## 2025-01-13 DIAGNOSIS — Z13.6 ENCOUNTER FOR SCREENING FOR CARDIOVASCULAR DISORDERS: ICD-10-CM

## 2025-01-13 DIAGNOSIS — I10 PRIMARY HYPERTENSION: Primary | ICD-10-CM

## 2025-01-13 DIAGNOSIS — R53.83 FATIGUE, UNSPECIFIED TYPE: ICD-10-CM

## 2025-01-14 LAB
ALBUMIN SERPL-MCNC: 3.9 G/DL (ref 3.5–5)
ALBUMIN/GLOB SERPL: 1.5 (ref 1.1–2.2)
ALP SERPL-CCNC: 76 U/L (ref 45–117)
ALT SERPL-CCNC: 16 U/L (ref 12–78)
ANION GAP SERPL CALC-SCNC: 5 MMOL/L (ref 2–12)
APPEARANCE UR: CLEAR
AST SERPL-CCNC: 12 U/L (ref 15–37)
BASOPHILS # BLD: 0.02 K/UL (ref 0–0.1)
BASOPHILS NFR BLD: 0.3 % (ref 0–1)
BILIRUB SERPL-MCNC: 0.6 MG/DL (ref 0.2–1)
BILIRUB UR QL: NEGATIVE
BUN SERPL-MCNC: 17 MG/DL (ref 6–20)
BUN/CREAT SERPL: 19 (ref 12–20)
CALCIUM SERPL-MCNC: 8.9 MG/DL (ref 8.5–10.1)
CHLORIDE SERPL-SCNC: 106 MMOL/L (ref 97–108)
CHOLEST SERPL-MCNC: 180 MG/DL
CO2 SERPL-SCNC: 29 MMOL/L (ref 21–32)
COLOR UR: NORMAL
CREAT SERPL-MCNC: 0.91 MG/DL (ref 0.7–1.3)
DIFFERENTIAL METHOD BLD: ABNORMAL
EOSINOPHIL # BLD: 0.11 K/UL (ref 0–0.4)
EOSINOPHIL NFR BLD: 1.8 % (ref 0–7)
ERYTHROCYTE [DISTWIDTH] IN BLOOD BY AUTOMATED COUNT: 12.7 % (ref 11.5–14.5)
GLOBULIN SER CALC-MCNC: 2.6 G/DL (ref 2–4)
GLUCOSE SERPL-MCNC: 99 MG/DL (ref 65–100)
GLUCOSE UR STRIP.AUTO-MCNC: NEGATIVE MG/DL
HCT VFR BLD AUTO: 39.6 % (ref 36.6–50.3)
HDLC SERPL-MCNC: 53 MG/DL
HDLC SERPL: 3.4 (ref 0–5)
HGB BLD-MCNC: 12.8 G/DL (ref 12.1–17)
HGB UR QL STRIP: NEGATIVE
IMM GRANULOCYTES # BLD AUTO: 0.01 K/UL (ref 0–0.04)
IMM GRANULOCYTES NFR BLD AUTO: 0.2 % (ref 0–0.5)
KETONES UR QL STRIP.AUTO: NEGATIVE MG/DL
LDLC SERPL CALC-MCNC: 107.6 MG/DL (ref 0–100)
LEUKOCYTE ESTERASE UR QL STRIP.AUTO: NEGATIVE
LYMPHOCYTES # BLD: 1.79 K/UL (ref 0.8–3.5)
LYMPHOCYTES NFR BLD: 29.3 % (ref 12–49)
MCH RBC QN AUTO: 31.8 PG (ref 26–34)
MCHC RBC AUTO-ENTMCNC: 32.3 G/DL (ref 30–36.5)
MCV RBC AUTO: 98.3 FL (ref 80–99)
MONOCYTES # BLD: 0.64 K/UL (ref 0–1)
MONOCYTES NFR BLD: 10.5 % (ref 5–13)
NEUTS SEG # BLD: 3.54 K/UL (ref 1.8–8)
NEUTS SEG NFR BLD: 57.9 % (ref 32–75)
NITRITE UR QL STRIP.AUTO: NEGATIVE
NRBC # BLD: 0 K/UL (ref 0–0.01)
NRBC BLD-RTO: 0 PER 100 WBC
PH UR STRIP: 7.5 (ref 5–8)
PLATELET # BLD AUTO: 189 K/UL (ref 150–400)
PMV BLD AUTO: 10.2 FL (ref 8.9–12.9)
POTASSIUM SERPL-SCNC: 4.1 MMOL/L (ref 3.5–5.1)
PROT SERPL-MCNC: 6.5 G/DL (ref 6.4–8.2)
PROT UR STRIP-MCNC: NEGATIVE MG/DL
RBC # BLD AUTO: 4.03 M/UL (ref 4.1–5.7)
SODIUM SERPL-SCNC: 140 MMOL/L (ref 136–145)
SP GR UR REFRACTOMETRY: 1.01 (ref 1–1.03)
TRIGL SERPL-MCNC: 97 MG/DL
UROBILINOGEN UR QL STRIP.AUTO: 1 EU/DL (ref 0.2–1)
VLDLC SERPL CALC-MCNC: 19.4 MG/DL
WBC # BLD AUTO: 6.1 K/UL (ref 4.1–11.1)

## 2025-01-20 ENCOUNTER — OFFICE VISIT (OUTPATIENT)
Facility: CLINIC | Age: 89
End: 2025-01-20

## 2025-01-20 VITALS
HEART RATE: 76 BPM | OXYGEN SATURATION: 98 % | HEIGHT: 69 IN | SYSTOLIC BLOOD PRESSURE: 134 MMHG | DIASTOLIC BLOOD PRESSURE: 74 MMHG | TEMPERATURE: 98.5 F | WEIGHT: 187.8 LBS | RESPIRATION RATE: 19 BRPM | BODY MASS INDEX: 27.81 KG/M2

## 2025-01-20 DIAGNOSIS — M47.26 OSTEOARTHRITIS OF SPINE WITH RADICULOPATHY, LUMBAR REGION: ICD-10-CM

## 2025-01-20 DIAGNOSIS — Z00.00 MEDICARE ANNUAL WELLNESS VISIT, SUBSEQUENT: ICD-10-CM

## 2025-01-20 DIAGNOSIS — I10 PRIMARY HYPERTENSION: Primary | ICD-10-CM

## 2025-01-20 SDOH — ECONOMIC STABILITY: FOOD INSECURITY: WITHIN THE PAST 12 MONTHS, THE FOOD YOU BOUGHT JUST DIDN'T LAST AND YOU DIDN'T HAVE MONEY TO GET MORE.: NEVER TRUE

## 2025-01-20 SDOH — ECONOMIC STABILITY: FOOD INSECURITY: WITHIN THE PAST 12 MONTHS, YOU WORRIED THAT YOUR FOOD WOULD RUN OUT BEFORE YOU GOT MONEY TO BUY MORE.: NEVER TRUE

## 2025-01-20 ASSESSMENT — PATIENT HEALTH QUESTIONNAIRE - PHQ9
SUM OF ALL RESPONSES TO PHQ QUESTIONS 1-9: 0
SUM OF ALL RESPONSES TO PHQ9 QUESTIONS 1 & 2: 0
2. FEELING DOWN, DEPRESSED OR HOPELESS: NOT AT ALL
SUM OF ALL RESPONSES TO PHQ QUESTIONS 1-9: 0
SUM OF ALL RESPONSES TO PHQ QUESTIONS 1-9: 0
1. LITTLE INTEREST OR PLEASURE IN DOING THINGS: NOT AT ALL
SUM OF ALL RESPONSES TO PHQ QUESTIONS 1-9: 0

## 2025-01-20 NOTE — PROGRESS NOTES
Medicare Annual Wellness Visit    Samy Hunter Jr is here for Medicare AWV and 6 Month Follow-Up    Assessment & Plan   Primary hypertension  Osteoarthritis of spine with radiculopathy, lumbar region  Medicare annual wellness visit, subsequent       Return in about 6 months (around 7/20/2025) for follow up.     Subjective       Patient's complete Health Risk Assessment and screening values have been reviewed and are found in Flowsheets. The following problems were reviewed today and where indicated follow up appointments were made and/or referrals ordered.    Positive Risk Factor Screenings with Interventions:     Cognitive:   Clock Drawing Test (CDT): (!) Abnormal  Words recalled: 3 Words Recalled  Total Score: 3  Total Score Interpretation: Normal Mini-Cog  Interventions:  See AVS for additional education material                  Safety:  Do you have non-slip mats or non-slip surfaces or shower bars or grab bars in your shower or bathtub?: (!) No  Interventions:  See AVS for additional education material     Advanced Directives:  Do you have a Living Will?: (!) No    Intervention:  has NO advanced directive - information provided                   Objective   Vitals:    01/20/25 1453   BP: 134/74   Site: Left Upper Arm   Position: Sitting   Cuff Size: Large Adult   Pulse: 76   Resp: 19   Temp: 98.5 °F (36.9 °C)   TempSrc: Temporal   SpO2: 98%   Weight: 85.2 kg (187 lb 12.8 oz)   Height: 1.753 m (5' 9\")      Body mass index is 27.73 kg/m².                  No Known Allergies  Prior to Visit Medications    Medication Sig Taking? Authorizing Provider   gabapentin (NEURONTIN) 400 MG capsule Take 1 capsule by mouth 3 times daily for 90 days. Max Daily Amount: 1,200 mg Yes HETAL Lewis MD   amLODIPine (NORVASC) 5 MG tablet TAKE 1 TABLET BY MOUTH EVERY DAY Yes Asuncion Easley MD   tiZANidine (ZANAFLEX) 4 MG tablet TAKE 1 TABLET BY MOUTH EVERY DAY AT NIGHT Yes HETAL Lewis MD   indomethacin (INDOCIN) 50

## 2025-01-20 NOTE — PROGRESS NOTES
Samy Hunter Jr is a 89 y.o. male     Chief Complaint   Patient presents with    Medicare AWV    6 Month Follow-Up       BP (!) 175/74 (Site: Left Upper Arm, Position: Sitting, Cuff Size: Large Adult)   Pulse 76   Temp 98.5 °F (36.9 °C) (Temporal)   Resp 19   Ht 1.753 m (5' 9\")   Wt 85.2 kg (187 lb 12.8 oz)   SpO2 98%   BMI 27.73 kg/m²     Health Maintenance Due   Topic Date Due    DTaP/Tdap/Td vaccine (1 - Tdap) Never done    Shingles vaccine (1 of 2) Never done    Pneumococcal 65+ years Vaccine (1 of 1 - PCV) Never done    Respiratory Syncytial Virus (RSV) Pregnant or age 60 yrs+ (1 - 1-dose 75+ series) Never done    Flu vaccine (1) 08/01/2024    COVID-19 Vaccine (4 - 2023-24 season) 09/01/2024    Annual Wellness Visit (Medicare)  01/17/2025    Depression Screen  01/17/2025         \"Have you been to the ER, urgent care clinic since your last visit?  Hospitalized since your last visit?\"    NO    “Have you seen or consulted any other health care providers outside of [facility] since your last visit?”    NO

## 2025-02-07 ENCOUNTER — OFFICE VISIT (OUTPATIENT)
Facility: CLINIC | Age: 89
End: 2025-02-07
Payer: MEDICARE

## 2025-02-07 VITALS
BODY MASS INDEX: 27.96 KG/M2 | DIASTOLIC BLOOD PRESSURE: 70 MMHG | OXYGEN SATURATION: 97 % | WEIGHT: 188.8 LBS | RESPIRATION RATE: 18 BRPM | HEART RATE: 65 BPM | SYSTOLIC BLOOD PRESSURE: 140 MMHG | HEIGHT: 69 IN | TEMPERATURE: 98.9 F

## 2025-02-07 DIAGNOSIS — F51.01 PRIMARY INSOMNIA: ICD-10-CM

## 2025-02-07 DIAGNOSIS — M48.061 SPINAL STENOSIS, LUMBAR REGION WITHOUT NEUROGENIC CLAUDICATION: ICD-10-CM

## 2025-02-07 DIAGNOSIS — G25.81 RLS (RESTLESS LEGS SYNDROME): Primary | ICD-10-CM

## 2025-02-07 DIAGNOSIS — I10 PRIMARY HYPERTENSION: ICD-10-CM

## 2025-02-07 PROCEDURE — 1126F AMNT PAIN NOTED NONE PRSNT: CPT | Performed by: INTERNAL MEDICINE

## 2025-02-07 PROCEDURE — G8419 CALC BMI OUT NRM PARAM NOF/U: HCPCS | Performed by: INTERNAL MEDICINE

## 2025-02-07 PROCEDURE — G8427 DOCREV CUR MEDS BY ELIG CLIN: HCPCS | Performed by: INTERNAL MEDICINE

## 2025-02-07 PROCEDURE — 1159F MED LIST DOCD IN RCRD: CPT | Performed by: INTERNAL MEDICINE

## 2025-02-07 PROCEDURE — 1036F TOBACCO NON-USER: CPT | Performed by: INTERNAL MEDICINE

## 2025-02-07 PROCEDURE — 99214 OFFICE O/P EST MOD 30 MIN: CPT | Performed by: INTERNAL MEDICINE

## 2025-02-07 PROCEDURE — 1123F ACP DISCUSS/DSCN MKR DOCD: CPT | Performed by: INTERNAL MEDICINE

## 2025-02-07 RX ORDER — GABAPENTIN 400 MG/1
400 CAPSULE ORAL 3 TIMES DAILY
Qty: 90 CAPSULE | Refills: 1 | Status: SHIPPED | OUTPATIENT
Start: 2025-02-07 | End: 2025-05-08

## 2025-02-07 RX ORDER — LISINOPRIL 10 MG/1
10 TABLET ORAL DAILY
Qty: 30 TABLET | Refills: 5 | Status: SHIPPED | OUTPATIENT
Start: 2025-02-07

## 2025-02-07 RX ORDER — ALPRAZOLAM 0.5 MG
0.5 TABLET ORAL NIGHTLY PRN
Qty: 30 TABLET | Refills: 2 | Status: SHIPPED | OUTPATIENT
Start: 2025-02-07 | End: 2025-05-08

## 2025-02-07 RX ORDER — ROPINIROLE 0.5 MG/1
0.5 TABLET, FILM COATED ORAL 3 TIMES DAILY
Qty: 90 TABLET | Refills: 3 | Status: SHIPPED | OUTPATIENT
Start: 2025-02-07

## 2025-02-07 NOTE — PROGRESS NOTES
Samy Hunter Jr is a 89 y.o. male     Chief Complaint   Patient presents with    Hypertension       BP (!) 140/70 (Site: Left Upper Arm, Position: Sitting, Cuff Size: Large Adult)   Pulse 65   Temp 98.9 °F (37.2 °C) (Temporal)   Resp 18   Ht 1.753 m (5' 9\")   Wt 85.6 kg (188 lb 12.8 oz)   SpO2 97%   BMI 27.88 kg/m²     Health Maintenance Due   Topic Date Due    DTaP/Tdap/Td vaccine (1 - Tdap) Never done    Shingles vaccine (1 of 2) Never done    Pneumococcal 50+ years Vaccine (1 of 1 - PCV) Never done    Respiratory Syncytial Virus (RSV) Pregnant or age 60 yrs+ (1 - 1-dose 75+ series) Never done    Flu vaccine (1) 08/01/2024    COVID-19 Vaccine (4 - 2024-25 season) 09/01/2024         \"Have you been to the ER, urgent care clinic since your last visit?  Hospitalized since your last visit?\"    NO    “Have you seen or consulted any other health care providers outside of [facility] since your last visit?”    NO

## 2025-02-25 NOTE — PROGRESS NOTES
Samy Hunter Jr is a 89 y.o. male and presents with Hypertension  .    Subjective:    Mr. Hunter presents today with increasing blood pressures.  He felt poorly and checked his blood pressure at home and was quite elevated.  He remains on amlodipine 5 mg daily for hypertension.  He has no shortness of breath, chest pain, palpitations, PND, orthopnea, or pedal edema.  He does have chronic pain from degenerative arthritis and has had previous lumbar laminectomy however this is not a new concern and his symptoms have not progressed.  He has experienced some headaches which prompted his concern regarding his blood pressure.  Past Medical History:   Diagnosis Date    Allergic rhinitis 12/15/2017    Arthritis of knee, left 12/15/2017    Aseptic meningitis     Chronic pain     chronic back pain    Colon polyps     Constipation 12/15/2017    Dysphagia 12/15/2017    Elevated blood pressure reading 12/15/2017    Fatigue 12/15/2017    Headache 12/15/2017    Hematuria 12/15/2017    Herpes zoster dermatitis 12/15/2017    Hyperkalemia 12/15/2017    Hypertension 12/15/2017    Insomnia 12/15/2017    Osteoarthritis 12/15/2017    Otitis externa 12/15/2017    Prostate cancer screening 12/15/2017    Right groin hernia     Sciatica 12/15/2017    Sciatica of left side associated with disorder of lumbosacral spine 12/15/2017    Shingles     Spinal stenosis 12/15/2017     Past Surgical History:   Procedure Laterality Date    CATARACT REMOVAL Bilateral     with lens implants    COLONOSCOPY      COLONOSCOPY N/A 11/29/2018    COLONOSCOPY performed by Julito Hamilton MD at [facility] ENDOSCOPY    COLONOSCOPY,DIAGNOSTIC  11/29/2018         COLONOSCOPY,DIAGNOSTIC  11/11/2014         EGD INSERT GUIDE WIRE DILATOR PASSAGE ESOPHAGUS  10/17/2013         EGD TRANSORAL BIOPSY SINGLE/MULTIPLE  10/17/2013         HERNIA REPAIR  06/05/2019    HERNIA REPAIR Left     ORTHOPEDIC SURGERY  08/2018    jose replacement, by Dr. Rosen    ORTHOPEDIC SURGERY  10/6/15

## 2025-02-26 ENCOUNTER — APPOINTMENT (OUTPATIENT)
Facility: HOSPITAL | Age: 89
DRG: 322 | End: 2025-02-26
Payer: MEDICARE

## 2025-02-26 ENCOUNTER — HOSPITAL ENCOUNTER (INPATIENT)
Facility: HOSPITAL | Age: 89
LOS: 1 days | Discharge: HOME | DRG: 322 | End: 2025-02-28
Attending: EMERGENCY MEDICINE | Admitting: STUDENT IN AN ORGANIZED HEALTH CARE EDUCATION/TRAINING PROGRAM
Payer: MEDICARE

## 2025-02-26 ENCOUNTER — HOSPITAL ENCOUNTER (EMERGENCY)
Facility: HOSPITAL | Age: 89
Discharge: HOME | DRG: 322 | End: 2025-02-26
Attending: EMERGENCY MEDICINE
Payer: MEDICARE

## 2025-02-26 ENCOUNTER — TELEPHONE (OUTPATIENT)
Facility: CLINIC | Age: 89
End: 2025-02-26

## 2025-02-26 VITALS
DIASTOLIC BLOOD PRESSURE: 59 MMHG | SYSTOLIC BLOOD PRESSURE: 153 MMHG | RESPIRATION RATE: 20 BRPM | HEIGHT: 74 IN | BODY MASS INDEX: 24.59 KG/M2 | OXYGEN SATURATION: 98 % | WEIGHT: 191.58 LBS | TEMPERATURE: 97.6 F | HEART RATE: 86 BPM

## 2025-02-26 DIAGNOSIS — M54.50 ACUTE ON CHRONIC LOW BACK PAIN: ICD-10-CM

## 2025-02-26 DIAGNOSIS — G89.29 ACUTE ON CHRONIC LOW BACK PAIN: ICD-10-CM

## 2025-02-26 DIAGNOSIS — M54.50 ACUTE EXACERBATION OF CHRONIC LOW BACK PAIN: Primary | ICD-10-CM

## 2025-02-26 DIAGNOSIS — I24.9 ACUTE CORONARY SYNDROME (HCC): ICD-10-CM

## 2025-02-26 DIAGNOSIS — G95.89 MYELOMALACIA (HCC): ICD-10-CM

## 2025-02-26 DIAGNOSIS — G89.29 ACUTE EXACERBATION OF CHRONIC LOW BACK PAIN: Primary | ICD-10-CM

## 2025-02-26 DIAGNOSIS — I21.4 NSTEMI (NON-ST ELEVATED MYOCARDIAL INFARCTION) (HCC): Primary | ICD-10-CM

## 2025-02-26 DIAGNOSIS — E86.0 DEHYDRATION: ICD-10-CM

## 2025-02-26 DIAGNOSIS — M48.061 SPINAL STENOSIS OF LUMBAR REGION WITHOUT NEUROGENIC CLAUDICATION: ICD-10-CM

## 2025-02-26 DIAGNOSIS — R11.2 INTRACTABLE NAUSEA AND VOMITING: ICD-10-CM

## 2025-02-26 DIAGNOSIS — R52 INTRACTABLE PAIN: ICD-10-CM

## 2025-02-26 LAB
ALBUMIN SERPL-MCNC: 4 G/DL (ref 3.5–5)
ALBUMIN SERPL-MCNC: 4 G/DL (ref 3.5–5)
ALBUMIN/GLOB SERPL: 1.3 (ref 1.1–2.2)
ALBUMIN/GLOB SERPL: 1.4 (ref 1.1–2.2)
ALP SERPL-CCNC: 87 U/L (ref 45–117)
ALP SERPL-CCNC: 89 U/L (ref 45–117)
ALT SERPL-CCNC: 14 U/L (ref 12–78)
ALT SERPL-CCNC: 15 U/L (ref 12–78)
ANION GAP SERPL CALC-SCNC: 4 MMOL/L (ref 2–12)
ANION GAP SERPL CALC-SCNC: 8 MMOL/L (ref 2–12)
AST SERPL-CCNC: 15 U/L (ref 15–37)
AST SERPL-CCNC: 18 U/L (ref 15–37)
BASOPHILS # BLD: 0.01 K/UL (ref 0–0.1)
BASOPHILS # BLD: 0.03 K/UL (ref 0–0.1)
BASOPHILS NFR BLD: 0.1 % (ref 0–1)
BASOPHILS NFR BLD: 0.4 % (ref 0–1)
BILIRUB SERPL-MCNC: 0.7 MG/DL (ref 0.2–1)
BILIRUB SERPL-MCNC: 0.8 MG/DL (ref 0.2–1)
BUN SERPL-MCNC: 15 MG/DL (ref 6–20)
BUN SERPL-MCNC: 16 MG/DL (ref 6–20)
BUN/CREAT SERPL: 14 (ref 12–20)
BUN/CREAT SERPL: 18 (ref 12–20)
CALCIUM SERPL-MCNC: 9.4 MG/DL (ref 8.5–10.1)
CALCIUM SERPL-MCNC: 9.7 MG/DL (ref 8.5–10.1)
CHLORIDE SERPL-SCNC: 103 MMOL/L (ref 97–108)
CHLORIDE SERPL-SCNC: 105 MMOL/L (ref 97–108)
CO2 SERPL-SCNC: 25 MMOL/L (ref 21–32)
CO2 SERPL-SCNC: 30 MMOL/L (ref 21–32)
CREAT SERPL-MCNC: 0.91 MG/DL (ref 0.7–1.3)
CREAT SERPL-MCNC: 1.09 MG/DL (ref 0.7–1.3)
DIFFERENTIAL METHOD BLD: ABNORMAL
DIFFERENTIAL METHOD BLD: NORMAL
EKG ATRIAL RATE: 84 BPM
EKG DIAGNOSIS: NORMAL
EKG P AXIS: 58 DEGREES
EKG P-R INTERVAL: 190 MS
EKG Q-T INTERVAL: 376 MS
EKG QRS DURATION: 94 MS
EKG QTC CALCULATION (BAZETT): 444 MS
EKG R AXIS: 41 DEGREES
EKG T AXIS: 70 DEGREES
EKG VENTRICULAR RATE: 84 BPM
EOSINOPHIL # BLD: 0 K/UL (ref 0–0.4)
EOSINOPHIL # BLD: 0.09 K/UL (ref 0–0.4)
EOSINOPHIL NFR BLD: 0 % (ref 0–7)
EOSINOPHIL NFR BLD: 1.2 % (ref 0–7)
ERYTHROCYTE [DISTWIDTH] IN BLOOD BY AUTOMATED COUNT: 12.4 % (ref 11.5–14.5)
ERYTHROCYTE [DISTWIDTH] IN BLOOD BY AUTOMATED COUNT: 12.5 % (ref 11.5–14.5)
FLUAV RNA SPEC QL NAA+PROBE: NOT DETECTED
FLUBV RNA SPEC QL NAA+PROBE: NOT DETECTED
GLOBULIN SER CALC-MCNC: 2.9 G/DL (ref 2–4)
GLOBULIN SER CALC-MCNC: 3.1 G/DL (ref 2–4)
GLUCOSE SERPL-MCNC: 107 MG/DL (ref 65–100)
GLUCOSE SERPL-MCNC: 138 MG/DL (ref 65–100)
HCT VFR BLD AUTO: 41.8 % (ref 36.6–50.3)
HCT VFR BLD AUTO: 42.6 % (ref 36.6–50.3)
HGB BLD-MCNC: 14.2 G/DL (ref 12.1–17)
HGB BLD-MCNC: 14.2 G/DL (ref 12.1–17)
IMM GRANULOCYTES # BLD AUTO: 0.01 K/UL (ref 0–0.04)
IMM GRANULOCYTES # BLD AUTO: 0.07 K/UL (ref 0–0.04)
IMM GRANULOCYTES NFR BLD AUTO: 0.1 % (ref 0–0.5)
IMM GRANULOCYTES NFR BLD AUTO: 0.7 % (ref 0–0.5)
INR PPP: 1.1 (ref 0.9–1.1)
LACTATE BLD-SCNC: 2.14 MMOL/L (ref 0.4–2)
LYMPHOCYTES # BLD: 0.95 K/UL (ref 0.8–3.5)
LYMPHOCYTES # BLD: 1.99 K/UL (ref 0.8–3.5)
LYMPHOCYTES NFR BLD: 27.1 % (ref 12–49)
LYMPHOCYTES NFR BLD: 9.8 % (ref 12–49)
MAGNESIUM SERPL-MCNC: 2 MG/DL (ref 1.6–2.4)
MCH RBC QN AUTO: 31.5 PG (ref 26–34)
MCH RBC QN AUTO: 32 PG (ref 26–34)
MCHC RBC AUTO-ENTMCNC: 33.3 G/DL (ref 30–36.5)
MCHC RBC AUTO-ENTMCNC: 34 G/DL (ref 30–36.5)
MCV RBC AUTO: 92.7 FL (ref 80–99)
MCV RBC AUTO: 95.9 FL (ref 80–99)
MONOCYTES # BLD: 0.29 K/UL (ref 0–1)
MONOCYTES # BLD: 0.66 K/UL (ref 0–1)
MONOCYTES NFR BLD: 3 % (ref 5–13)
MONOCYTES NFR BLD: 9 % (ref 5–13)
NEUTS SEG # BLD: 4.56 K/UL (ref 1.8–8)
NEUTS SEG # BLD: 8.37 K/UL (ref 1.8–8)
NEUTS SEG NFR BLD: 62.2 % (ref 32–75)
NEUTS SEG NFR BLD: 86.4 % (ref 32–75)
NRBC # BLD: 0 K/UL (ref 0–0.01)
NRBC # BLD: 0 K/UL (ref 0–0.01)
NRBC BLD-RTO: 0 PER 100 WBC
NRBC BLD-RTO: 0 PER 100 WBC
PLATELET # BLD AUTO: 213 K/UL (ref 150–400)
PLATELET # BLD AUTO: 229 K/UL (ref 150–400)
PMV BLD AUTO: 10.1 FL (ref 8.9–12.9)
PMV BLD AUTO: 9.8 FL (ref 8.9–12.9)
POTASSIUM SERPL-SCNC: 4.1 MMOL/L (ref 3.5–5.1)
POTASSIUM SERPL-SCNC: 4.1 MMOL/L (ref 3.5–5.1)
PROCALCITONIN SERPL-MCNC: <0.05 NG/ML
PROT SERPL-MCNC: 6.9 G/DL (ref 6.4–8.2)
PROT SERPL-MCNC: 7.1 G/DL (ref 6.4–8.2)
PROTHROMBIN TIME: 11.9 SEC (ref 9.2–11.2)
RBC # BLD AUTO: 4.44 M/UL (ref 4.1–5.7)
RBC # BLD AUTO: 4.51 M/UL (ref 4.1–5.7)
SARS-COV-2 RNA RESP QL NAA+PROBE: NOT DETECTED
SODIUM SERPL-SCNC: 136 MMOL/L (ref 136–145)
SODIUM SERPL-SCNC: 139 MMOL/L (ref 136–145)
SOURCE: NORMAL
TROPONIN I SERPL HS-MCNC: 111 NG/L (ref 0–76)
TROPONIN I SERPL HS-MCNC: 9 NG/L (ref 0–76)
WBC # BLD AUTO: 7.3 K/UL (ref 4.1–11.1)
WBC # BLD AUTO: 9.7 K/UL (ref 4.1–11.1)

## 2025-02-26 PROCEDURE — 83735 ASSAY OF MAGNESIUM: CPT

## 2025-02-26 PROCEDURE — 6360000004 HC RX CONTRAST MEDICATION: Performed by: RADIOLOGY

## 2025-02-26 PROCEDURE — 6360000002 HC RX W HCPCS: Performed by: EMERGENCY MEDICINE

## 2025-02-26 PROCEDURE — 85025 COMPLETE CBC W/AUTO DIFF WBC: CPT

## 2025-02-26 PROCEDURE — 2500000003 HC RX 250 WO HCPCS: Performed by: EMERGENCY MEDICINE

## 2025-02-26 PROCEDURE — 36415 COLL VENOUS BLD VENIPUNCTURE: CPT

## 2025-02-26 PROCEDURE — 99285 EMERGENCY DEPT VISIT HI MDM: CPT

## 2025-02-26 PROCEDURE — 85610 PROTHROMBIN TIME: CPT

## 2025-02-26 PROCEDURE — 72158 MRI LUMBAR SPINE W/O & W/DYE: CPT

## 2025-02-26 PROCEDURE — 96375 TX/PRO/DX INJ NEW DRUG ADDON: CPT

## 2025-02-26 PROCEDURE — 80053 COMPREHEN METABOLIC PANEL: CPT

## 2025-02-26 PROCEDURE — 87040 BLOOD CULTURE FOR BACTERIA: CPT

## 2025-02-26 PROCEDURE — 84145 PROCALCITONIN (PCT): CPT

## 2025-02-26 PROCEDURE — 83605 ASSAY OF LACTIC ACID: CPT

## 2025-02-26 PROCEDURE — A9579 GAD-BASE MR CONTRAST NOS,1ML: HCPCS | Performed by: EMERGENCY MEDICINE

## 2025-02-26 PROCEDURE — 96374 THER/PROPH/DIAG INJ IV PUSH: CPT

## 2025-02-26 PROCEDURE — 93005 ELECTROCARDIOGRAM TRACING: CPT | Performed by: EMERGENCY MEDICINE

## 2025-02-26 PROCEDURE — 81001 URINALYSIS AUTO W/SCOPE: CPT

## 2025-02-26 PROCEDURE — 74177 CT ABD & PELVIS W/CONTRAST: CPT

## 2025-02-26 PROCEDURE — 99284 EMERGENCY DEPT VISIT MOD MDM: CPT

## 2025-02-26 PROCEDURE — 6370000000 HC RX 637 (ALT 250 FOR IP): Performed by: EMERGENCY MEDICINE

## 2025-02-26 PROCEDURE — 6360000004 HC RX CONTRAST MEDICATION: Performed by: EMERGENCY MEDICINE

## 2025-02-26 PROCEDURE — 84484 ASSAY OF TROPONIN QUANT: CPT

## 2025-02-26 PROCEDURE — 87636 SARSCOV2 & INF A&B AMP PRB: CPT

## 2025-02-26 RX ORDER — LISINOPRIL 5 MG/1
10 TABLET ORAL
Status: COMPLETED | OUTPATIENT
Start: 2025-02-26 | End: 2025-02-26

## 2025-02-26 RX ORDER — FENTANYL CITRATE 50 UG/ML
50 INJECTION, SOLUTION INTRAMUSCULAR; INTRAVENOUS
Status: COMPLETED | OUTPATIENT
Start: 2025-02-26 | End: 2025-02-26

## 2025-02-26 RX ORDER — LIDOCAINE 4 G/G
1 PATCH TOPICAL
Status: DISCONTINUED | OUTPATIENT
Start: 2025-02-26 | End: 2025-02-26 | Stop reason: HOSPADM

## 2025-02-26 RX ORDER — IOPAMIDOL 755 MG/ML
100 INJECTION, SOLUTION INTRAVASCULAR
Status: COMPLETED | OUTPATIENT
Start: 2025-02-26 | End: 2025-02-26

## 2025-02-26 RX ORDER — FENTANYL CITRATE 50 UG/ML
50 INJECTION, SOLUTION INTRAMUSCULAR; INTRAVENOUS
Status: DISCONTINUED | OUTPATIENT
Start: 2025-02-26 | End: 2025-02-27

## 2025-02-26 RX ORDER — DIAZEPAM 10 MG/2ML
2.5 INJECTION, SOLUTION INTRAMUSCULAR; INTRAVENOUS
Status: COMPLETED | OUTPATIENT
Start: 2025-02-26 | End: 2025-02-26

## 2025-02-26 RX ORDER — AMLODIPINE BESYLATE 5 MG/1
5 TABLET ORAL
Status: COMPLETED | OUTPATIENT
Start: 2025-02-26 | End: 2025-02-26

## 2025-02-26 RX ORDER — SODIUM CHLORIDE, SODIUM LACTATE, POTASSIUM CHLORIDE, AND CALCIUM CHLORIDE .6; .31; .03; .02 G/100ML; G/100ML; G/100ML; G/100ML
1000 INJECTION, SOLUTION INTRAVENOUS ONCE
Status: COMPLETED | OUTPATIENT
Start: 2025-02-26 | End: 2025-02-27

## 2025-02-26 RX ORDER — DIAZEPAM 5 MG/1
5 TABLET ORAL ONCE
Status: COMPLETED | OUTPATIENT
Start: 2025-02-26 | End: 2025-02-26

## 2025-02-26 RX ORDER — ONDANSETRON 4 MG/1
4 TABLET, ORALLY DISINTEGRATING ORAL 3 TIMES DAILY PRN
Qty: 21 TABLET | Refills: 0 | Status: SHIPPED | OUTPATIENT
Start: 2025-02-26

## 2025-02-26 RX ORDER — ONDANSETRON 2 MG/ML
8 INJECTION INTRAMUSCULAR; INTRAVENOUS ONCE
Status: COMPLETED | OUTPATIENT
Start: 2025-02-26 | End: 2025-02-26

## 2025-02-26 RX ORDER — ONDANSETRON 2 MG/ML
4 INJECTION INTRAMUSCULAR; INTRAVENOUS ONCE
Status: COMPLETED | OUTPATIENT
Start: 2025-02-26 | End: 2025-02-26

## 2025-02-26 RX ORDER — MORPHINE SULFATE 2 MG/ML
2 INJECTION, SOLUTION INTRAMUSCULAR; INTRAVENOUS ONCE
Status: DISCONTINUED | OUTPATIENT
Start: 2025-02-26 | End: 2025-02-26

## 2025-02-26 RX ORDER — KETOROLAC TROMETHAMINE 30 MG/ML
15 INJECTION, SOLUTION INTRAMUSCULAR; INTRAVENOUS
Status: COMPLETED | OUTPATIENT
Start: 2025-02-26 | End: 2025-02-26

## 2025-02-26 RX ORDER — OXYCODONE HYDROCHLORIDE 5 MG/1
5 TABLET ORAL EVERY 6 HOURS PRN
Qty: 12 TABLET | Refills: 0 | Status: SHIPPED | OUTPATIENT
Start: 2025-02-26 | End: 2025-03-01

## 2025-02-26 RX ADMIN — ONDANSETRON 8 MG: 2 INJECTION, SOLUTION INTRAMUSCULAR; INTRAVENOUS at 22:20

## 2025-02-26 RX ADMIN — FENTANYL CITRATE 50 MCG: 50 INJECTION INTRAMUSCULAR; INTRAVENOUS at 22:22

## 2025-02-26 RX ADMIN — KETOROLAC TROMETHAMINE 15 MG: 30 INJECTION, SOLUTION INTRAMUSCULAR at 08:47

## 2025-02-26 RX ADMIN — ONDANSETRON 4 MG: 2 INJECTION INTRAMUSCULAR; INTRAVENOUS at 08:47

## 2025-02-26 RX ADMIN — LISINOPRIL 10 MG: 5 TABLET ORAL at 08:48

## 2025-02-26 RX ADMIN — AMLODIPINE BESYLATE 5 MG: 5 TABLET ORAL at 08:48

## 2025-02-26 RX ADMIN — DIAZEPAM 2.5 MG: 5 INJECTION, SOLUTION INTRAMUSCULAR; INTRAVENOUS at 09:53

## 2025-02-26 RX ADMIN — IOPAMIDOL 100 ML: 755 INJECTION, SOLUTION INTRAVENOUS at 23:03

## 2025-02-26 RX ADMIN — DIAZEPAM 5 MG: 5 TABLET ORAL at 08:48

## 2025-02-26 RX ADMIN — GADOTERIDOL 18 ML: 279.3 INJECTION, SOLUTION INTRAVENOUS at 22:48

## 2025-02-26 RX ADMIN — FENTANYL CITRATE 50 MCG: 50 INJECTION INTRAMUSCULAR; INTRAVENOUS at 08:47

## 2025-02-26 RX ADMIN — WATER 125 MG: 1 INJECTION INTRAMUSCULAR; INTRAVENOUS; SUBCUTANEOUS at 09:53

## 2025-02-26 ASSESSMENT — LIFESTYLE VARIABLES
HOW OFTEN DO YOU HAVE A DRINK CONTAINING ALCOHOL: NEVER
HOW MANY STANDARD DRINKS CONTAINING ALCOHOL DO YOU HAVE ON A TYPICAL DAY: PATIENT DOES NOT DRINK

## 2025-02-26 ASSESSMENT — PAIN DESCRIPTION - LOCATION
LOCATION: BACK;ABDOMEN
LOCATION: BACK
LOCATION: BACK

## 2025-02-26 ASSESSMENT — PAIN DESCRIPTION - ORIENTATION
ORIENTATION: LEFT
ORIENTATION: LOWER
ORIENTATION: LOWER

## 2025-02-26 ASSESSMENT — PAIN - FUNCTIONAL ASSESSMENT
PAIN_FUNCTIONAL_ASSESSMENT: 0-10
PAIN_FUNCTIONAL_ASSESSMENT: 0-10
PAIN_FUNCTIONAL_ASSESSMENT: PREVENTS OR INTERFERES WITH MANY ACTIVE NOT PASSIVE ACTIVITIES

## 2025-02-26 ASSESSMENT — PAIN DESCRIPTION - ONSET: ONSET: ON-GOING

## 2025-02-26 ASSESSMENT — PAIN DESCRIPTION - PAIN TYPE: TYPE: CHRONIC PAIN;ACUTE PAIN

## 2025-02-26 ASSESSMENT — PAIN DESCRIPTION - FREQUENCY: FREQUENCY: CONTINUOUS

## 2025-02-26 ASSESSMENT — PAIN DESCRIPTION - DESCRIPTORS: DESCRIPTORS: PATIENT UNABLE TO DESCRIBE

## 2025-02-26 ASSESSMENT — PAIN SCALES - GENERAL
PAINLEVEL_OUTOF10: 8
PAINLEVEL_OUTOF10: 10
PAINLEVEL_OUTOF10: 10

## 2025-02-26 ASSESSMENT — PAIN DESCRIPTION - DIRECTION: RADIATING_TOWARDS: R SIDE

## 2025-02-26 NOTE — TELEPHONE ENCOUNTER
Patients daughter advised per Dr. Lewis take tylenol as directed for fever and a prescription of zofran will be sent to his pharmacy.

## 2025-02-26 NOTE — TELEPHONE ENCOUNTER
Patients daughter called stating her dad was in the ER this morning for lower back pain.  Daughter states he has been nauseated all day, she thinks its from the medication but not for sure.  She wanted to know if Dr. Lewis would call in [facility] to [facility].  Please advise.     Daughters phone #[phone]

## 2025-02-26 NOTE — ED PROVIDER NOTES
[facility] EMERGENCY DEPARTMENT  EMERGENCY DEPARTMENT ENCOUNTER       Pt Name: Samy Hunter Jr  MRN: [id number]  Birthdate 1935  Date of evaluation: 2/26/2025  Provider: Ismael Moreland DO   PCP: HETAL Lewis MD  Note Started: 8:28 AM EST 2/26/25     CHIEF COMPLAINT       Chief Complaint   Patient presents with    Back Pain     Pt arrives to ED via EMS with chief complaint of 10/10 lower back pain after working on cabinets all day yesterday. Pt denies any recent falls and does not recall \"tweaking or \"turning\" the wrong way yesterday.     Hypertension     Pt reports hx of HTN and had a pressure of 201/90 when EMS arrived. Pt reports not taking meds today. Pt reports discomfort in chest. Pt describes it as having to burp        HISTORY OF PRESENT ILLNESS: 1 or more elements      History From: Patient, History limited by: none     Samy Hunter Jr is a 89 y.o. male presents to the emergency department by EMS for evaluation of severe low back pain and elevated blood pressure.       Please See [facility] for Additional Details of the HPI/PMH  Nursing Notes were all reviewed and agreed with or any disagreements were addressed in the HPI.     REVIEW OF SYSTEMS        Positives and Pertinent negatives as per HPI.    PAST HISTORY     Past Medical History:  Past Medical History:   Diagnosis Date    Allergic rhinitis 12/15/2017    Arthritis of knee, left 12/15/2017    Aseptic meningitis     Chronic pain     chronic back pain    Colon polyps     Constipation 12/15/2017    Dysphagia 12/15/2017    Elevated blood pressure reading 12/15/2017    Fatigue 12/15/2017    Headache 12/15/2017    Hematuria 12/15/2017    Herpes zoster dermatitis 12/15/2017    Hyperkalemia 12/15/2017    Hypertension 12/15/2017    Insomnia 12/15/2017    Osteoarthritis 12/15/2017    Otitis externa 12/15/2017    Prostate cancer screening 12/15/2017    Right groin hernia     Sciatica 12/15/2017    Sciatica of left side associated with disorder  of lumbosacral spine 12/15/2017    Shingles     Spinal stenosis 12/15/2017       Past Surgical History:  Past Surgical History:   Procedure Laterality Date    CATARACT REMOVAL Bilateral     with lens implants    COLONOSCOPY      COLONOSCOPY N/A 11/29/2018    COLONOSCOPY performed by Julito Hamilton MD at [facility] ENDOSCOPY    COLONOSCOPY,DIAGNOSTIC  11/29/2018         COLONOSCOPY,DIAGNOSTIC  11/11/2014         EGD INSERT GUIDE WIRE DILATOR PASSAGE ESOPHAGUS  10/17/2013         EGD TRANSORAL BIOPSY SINGLE/MULTIPLE  10/17/2013         HERNIA REPAIR  06/05/2019    HERNIA REPAIR Left     ORTHOPEDIC SURGERY  08/2018    jose replacement, by Dr. Rosen    ORTHOPEDIC SURGERY  10/6/15    LEFT L5-S1 MICRODISCECTOMY     ORTHOPEDIC SURGERY  2008    rods placed in back    OTHER SURGICAL HISTORY      steroid injection for back pain    TONSILLECTOMY         Family History:  Family History   Problem Relation Age of Onset    Heart Failure Father     Hypertension Father     Heart Failure Mother     Hypertension Mother     Lung Disease Mother         lung cancer       Social History:  Social History     Tobacco Use    Smoking status: Never    Smokeless tobacco: Never   Vaping Use    Vaping status: Never Used   Substance Use Topics    Alcohol use: Never    Drug use: Never       Allergies:  No Known Allergies    CURRENT MEDICATIONS      Previous Medications    ACETAMINOPHEN (TYLENOL) 325 MG TABLET    Take 2 tablets by mouth every 4 hours as needed    ALPRAZOLAM (XANAX) 0.5 MG TABLET    Take 1 tablet by mouth nightly as needed for Sleep or Anxiety for up to 90 days. Max Daily Amount: 0.5 mg    AMLODIPINE (NORVASC) 5 MG TABLET    TAKE 1 TABLET BY MOUTH EVERY DAY    GABAPENTIN (NEURONTIN) 400 MG CAPSULE    Take 1 capsule by mouth 3 times daily for 90 days. Max Daily Amount: 1,200 mg    INDOMETHACIN (INDOCIN) 50 MG CAPSULE    TAKE 1 CAPSULE BY MOUTH 3 TIMES DAILY AS NEEDED FOR PAIN.    LISINOPRIL (PRINIVIL;ZESTRIL) 10 MG TABLET    Take 1 tablet by

## 2025-02-26 NOTE — PROGRESS NOTES
Patient contacted this office with elevated blood pressure and has developed a fever.  He was seen in the ER this morning because of back pain.  He was treated at the hospital with medications for pain and his symptoms had improved.  He had not taken his blood pressure medications this morning when he presented to the ER.  Since being discharged he has developed a fever.  He request a prescription for Zofran to take as needed for nausea.  The patient has an appointment to be seen in the morning.  If symptoms progress return to the ER for evaluation.

## 2025-02-26 NOTE — ED NOTES
Patient discharged by LORETTA Morrissey. Patient provided with discharge instructions Rx and instructions on follow up care. Patient wheeled out of ED by this RN. This RN assisted pt into family's car.

## 2025-02-27 ENCOUNTER — APPOINTMENT (OUTPATIENT)
Facility: HOSPITAL | Age: 89
DRG: 322 | End: 2025-02-27
Payer: MEDICARE

## 2025-02-27 PROBLEM — I21.4 NSTEMI (NON-ST ELEVATED MYOCARDIAL INFARCTION) (HCC): Status: ACTIVE | Noted: 2025-02-27

## 2025-02-27 LAB
ACT BLD: 326 SECS (ref 79–138)
ANION GAP SERPL CALC-SCNC: 5 MMOL/L (ref 2–12)
APPEARANCE UR: CLEAR
BACTERIA URNS QL MICRO: ABNORMAL /HPF
BASOPHILS # BLD: 0.01 K/UL (ref 0–0.1)
BASOPHILS NFR BLD: 0.1 % (ref 0–1)
BILIRUB UR QL: NEGATIVE
BUN SERPL-MCNC: 17 MG/DL (ref 6–20)
BUN/CREAT SERPL: 18 (ref 12–20)
CALCIUM SERPL-MCNC: 9.2 MG/DL (ref 8.5–10.1)
CHLORIDE SERPL-SCNC: 104 MMOL/L (ref 97–108)
CHOLEST SERPL-MCNC: 185 MG/DL
CO2 SERPL-SCNC: 26 MMOL/L (ref 21–32)
COLOR UR: ABNORMAL
CREAT SERPL-MCNC: 0.95 MG/DL (ref 0.7–1.3)
DIFFERENTIAL METHOD BLD: ABNORMAL
ECHO BSA: 2.13 M2
EKG ATRIAL RATE: 79 BPM
EKG ATRIAL RATE: 89 BPM
EKG DIAGNOSIS: NORMAL
EKG DIAGNOSIS: NORMAL
EKG P AXIS: 58 DEGREES
EKG P AXIS: 74 DEGREES
EKG P-R INTERVAL: 198 MS
EKG P-R INTERVAL: 208 MS
EKG Q-T INTERVAL: 418 MS
EKG Q-T INTERVAL: 438 MS
EKG QRS DURATION: 150 MS
EKG QRS DURATION: 154 MS
EKG QTC CALCULATION (BAZETT): 502 MS
EKG QTC CALCULATION (BAZETT): 508 MS
EKG R AXIS: -57 DEGREES
EKG R AXIS: -69 DEGREES
EKG T AXIS: 89 DEGREES
EKG T AXIS: 95 DEGREES
EKG VENTRICULAR RATE: 79 BPM
EKG VENTRICULAR RATE: 89 BPM
EOSINOPHIL # BLD: 0 K/UL (ref 0–0.4)
EOSINOPHIL NFR BLD: 0 % (ref 0–7)
EPITH CASTS URNS QL MICRO: ABNORMAL /LPF
ERYTHROCYTE [DISTWIDTH] IN BLOOD BY AUTOMATED COUNT: 12.4 % (ref 11.5–14.5)
EST. AVERAGE GLUCOSE BLD GHB EST-MCNC: 114 MG/DL
GLUCOSE SERPL-MCNC: 152 MG/DL (ref 65–100)
GLUCOSE UR STRIP.AUTO-MCNC: NEGATIVE MG/DL
HBA1C MFR BLD: 5.6 % (ref 4–5.6)
HCT VFR BLD AUTO: 42.8 % (ref 36.6–50.3)
HDLC SERPL-MCNC: 52 MG/DL
HDLC SERPL: 3.6 (ref 0–5)
HGB BLD-MCNC: 14.1 G/DL (ref 12.1–17)
HGB UR QL STRIP: NEGATIVE
IMM GRANULOCYTES # BLD AUTO: 0.08 K/UL (ref 0–0.04)
IMM GRANULOCYTES NFR BLD AUTO: 0.6 % (ref 0–0.5)
KETONES UR QL STRIP.AUTO: 40 MG/DL
LACTATE BLD-SCNC: 1.39 MMOL/L (ref 0.4–2)
LACTATE SERPL-SCNC: 0.9 MMOL/L (ref 0.4–2)
LACTATE SERPL-SCNC: 1.4 MMOL/L (ref 0.4–2)
LDLC SERPL CALC-MCNC: 121.4 MG/DL (ref 0–100)
LEUKOCYTE ESTERASE UR QL STRIP.AUTO: NEGATIVE
LYMPHOCYTES # BLD: 0.94 K/UL (ref 0.8–3.5)
LYMPHOCYTES NFR BLD: 7.3 % (ref 12–49)
MCH RBC QN AUTO: 31.3 PG (ref 26–34)
MCHC RBC AUTO-ENTMCNC: 32.9 G/DL (ref 30–36.5)
MCV RBC AUTO: 94.9 FL (ref 80–99)
MONOCYTES # BLD: 0.39 K/UL (ref 0–1)
MONOCYTES NFR BLD: 3 % (ref 5–13)
NEUTS SEG # BLD: 11.42 K/UL (ref 1.8–8)
NEUTS SEG NFR BLD: 89 % (ref 32–75)
NITRITE UR QL STRIP.AUTO: NEGATIVE
NRBC # BLD: 0 K/UL (ref 0–0.01)
NRBC BLD-RTO: 0 PER 100 WBC
NT PRO BNP: 1731 PG/ML
PH UR STRIP: 8.5 (ref 5–8)
PLATELET # BLD AUTO: 214 K/UL (ref 150–400)
PMV BLD AUTO: 10.1 FL (ref 8.9–12.9)
POTASSIUM SERPL-SCNC: 4.4 MMOL/L (ref 3.5–5.1)
PROT UR STRIP-MCNC: 30 MG/DL
RBC # BLD AUTO: 4.51 M/UL (ref 4.1–5.7)
RBC #/AREA URNS HPF: ABNORMAL /HPF (ref 0–5)
SODIUM SERPL-SCNC: 135 MMOL/L (ref 136–145)
SP GR UR REFRACTOMETRY: 1.02
SPERM URNS QL MICRO: PRESENT
TRIGL SERPL-MCNC: 58 MG/DL
TROPONIN I SERPL HS-MCNC: 142 NG/L (ref 0–76)
TSH SERPL DL<=0.05 MIU/L-ACNC: 1.85 UIU/ML (ref 0.36–3.74)
URINE CULTURE IF INDICATED: ABNORMAL
UROBILINOGEN UR QL STRIP.AUTO: 1 EU/DL (ref 0.2–1)
VLDLC SERPL CALC-MCNC: 11.6 MG/DL
WBC # BLD AUTO: 12.8 K/UL (ref 4.1–11.1)
WBC URNS QL MICRO: ABNORMAL /HPF (ref 0–4)

## 2025-02-27 PROCEDURE — 6360000002 HC RX W HCPCS: Performed by: STUDENT IN AN ORGANIZED HEALTH CARE EDUCATION/TRAINING PROGRAM

## 2025-02-27 PROCEDURE — 97165 OT EVAL LOW COMPLEX 30 MIN: CPT | Performed by: OCCUPATIONAL THERAPIST

## 2025-02-27 PROCEDURE — 2580000003 HC RX 258: Performed by: INTERNAL MEDICINE

## 2025-02-27 PROCEDURE — C1887 CATHETER, GUIDING: HCPCS | Performed by: INTERNAL MEDICINE

## 2025-02-27 PROCEDURE — 2500000003 HC RX 250 WO HCPCS: Performed by: STUDENT IN AN ORGANIZED HEALTH CARE EDUCATION/TRAINING PROGRAM

## 2025-02-27 PROCEDURE — 85347 COAGULATION TIME ACTIVATED: CPT

## 2025-02-27 PROCEDURE — 97161 PT EVAL LOW COMPLEX 20 MIN: CPT

## 2025-02-27 PROCEDURE — C1769 GUIDE WIRE: HCPCS | Performed by: INTERNAL MEDICINE

## 2025-02-27 PROCEDURE — 96375 TX/PRO/DX INJ NEW DRUG ADDON: CPT

## 2025-02-27 PROCEDURE — 80048 BASIC METABOLIC PNL TOTAL CA: CPT

## 2025-02-27 PROCEDURE — 93005 ELECTROCARDIOGRAM TRACING: CPT | Performed by: EMERGENCY MEDICINE

## 2025-02-27 PROCEDURE — 6370000000 HC RX 637 (ALT 250 FOR IP): Performed by: INTERNAL MEDICINE

## 2025-02-27 PROCEDURE — 2580000003 HC RX 258: Performed by: EMERGENCY MEDICINE

## 2025-02-27 PROCEDURE — 6360000002 HC RX W HCPCS: Performed by: INTERNAL MEDICINE

## 2025-02-27 PROCEDURE — C9600 PERC DRUG-EL COR STENT SING: HCPCS | Performed by: INTERNAL MEDICINE

## 2025-02-27 PROCEDURE — B2151ZZ FLUOROSCOPY OF LEFT HEART USING LOW OSMOLAR CONTRAST: ICD-10-PCS | Performed by: INTERNAL MEDICINE

## 2025-02-27 PROCEDURE — 6370000000 HC RX 637 (ALT 250 FOR IP): Performed by: STUDENT IN AN ORGANIZED HEALTH CARE EDUCATION/TRAINING PROGRAM

## 2025-02-27 PROCEDURE — 74174 CTA ABD&PLVS W/CONTRAST: CPT

## 2025-02-27 PROCEDURE — 84443 ASSAY THYROID STIM HORMONE: CPT

## 2025-02-27 PROCEDURE — 96361 HYDRATE IV INFUSION ADD-ON: CPT

## 2025-02-27 PROCEDURE — 96365 THER/PROPH/DIAG IV INF INIT: CPT

## 2025-02-27 PROCEDURE — 84484 ASSAY OF TROPONIN QUANT: CPT

## 2025-02-27 PROCEDURE — C1725 CATH, TRANSLUMIN NON-LASER: HCPCS | Performed by: INTERNAL MEDICINE

## 2025-02-27 PROCEDURE — 71275 CT ANGIOGRAPHY CHEST: CPT

## 2025-02-27 PROCEDURE — 97530 THERAPEUTIC ACTIVITIES: CPT | Performed by: OCCUPATIONAL THERAPIST

## 2025-02-27 PROCEDURE — 36415 COLL VENOUS BLD VENIPUNCTURE: CPT

## 2025-02-27 PROCEDURE — C1713 ANCHOR/SCREW BN/BN,TIS/BN: HCPCS | Performed by: INTERNAL MEDICINE

## 2025-02-27 PROCEDURE — 93005 ELECTROCARDIOGRAM TRACING: CPT | Performed by: INTERNAL MEDICINE

## 2025-02-27 PROCEDURE — 4A023N7 MEASUREMENT OF CARDIAC SAMPLING AND PRESSURE, LEFT HEART, PERCUTANEOUS APPROACH: ICD-10-PCS | Performed by: INTERNAL MEDICINE

## 2025-02-27 PROCEDURE — 93005 ELECTROCARDIOGRAM TRACING: CPT | Performed by: STUDENT IN AN ORGANIZED HEALTH CARE EDUCATION/TRAINING PROGRAM

## 2025-02-27 PROCEDURE — C1874 STENT, COATED/COV W/DEL SYS: HCPCS | Performed by: INTERNAL MEDICINE

## 2025-02-27 PROCEDURE — 96372 THER/PROPH/DIAG INJ SC/IM: CPT

## 2025-02-27 PROCEDURE — 85025 COMPLETE CBC W/AUTO DIFF WBC: CPT

## 2025-02-27 PROCEDURE — 99153 MOD SED SAME PHYS/QHP EA: CPT | Performed by: INTERNAL MEDICINE

## 2025-02-27 PROCEDURE — C1760 CLOSURE DEV, VASC: HCPCS | Performed by: INTERNAL MEDICINE

## 2025-02-27 PROCEDURE — 83880 ASSAY OF NATRIURETIC PEPTIDE: CPT

## 2025-02-27 PROCEDURE — 99152 MOD SED SAME PHYS/QHP 5/>YRS: CPT | Performed by: INTERNAL MEDICINE

## 2025-02-27 PROCEDURE — 6370000000 HC RX 637 (ALT 250 FOR IP): Performed by: EMERGENCY MEDICINE

## 2025-02-27 PROCEDURE — 027034Z DILATION OF CORONARY ARTERY, ONE ARTERY WITH DRUG-ELUTING INTRALUMINAL DEVICE, PERCUTANEOUS APPROACH: ICD-10-PCS | Performed by: INTERNAL MEDICINE

## 2025-02-27 PROCEDURE — 2709999900 HC NON-CHARGEABLE SUPPLY: Performed by: INTERNAL MEDICINE

## 2025-02-27 PROCEDURE — 97530 THERAPEUTIC ACTIVITIES: CPT

## 2025-02-27 PROCEDURE — C1894 INTRO/SHEATH, NON-LASER: HCPCS | Performed by: INTERNAL MEDICINE

## 2025-02-27 PROCEDURE — 80061 LIPID PANEL: CPT

## 2025-02-27 PROCEDURE — 6360000002 HC RX W HCPCS: Performed by: EMERGENCY MEDICINE

## 2025-02-27 PROCEDURE — 83605 ASSAY OF LACTIC ACID: CPT

## 2025-02-27 PROCEDURE — B2111ZZ FLUOROSCOPY OF MULTIPLE CORONARY ARTERIES USING LOW OSMOLAR CONTRAST: ICD-10-PCS | Performed by: INTERNAL MEDICINE

## 2025-02-27 PROCEDURE — 83036 HEMOGLOBIN GLYCOSYLATED A1C: CPT

## 2025-02-27 PROCEDURE — 94762 N-INVAS EAR/PLS OXIMTRY CONT: CPT

## 2025-02-27 PROCEDURE — 93458 L HRT ARTERY/VENTRICLE ANGIO: CPT | Performed by: INTERNAL MEDICINE

## 2025-02-27 PROCEDURE — 6360000004 HC RX CONTRAST MEDICATION: Performed by: RADIOLOGY

## 2025-02-27 PROCEDURE — 2060000000 HC ICU INTERMEDIATE R&B

## 2025-02-27 RX ORDER — MORPHINE SULFATE 4 MG/ML
4 INJECTION, SOLUTION INTRAMUSCULAR; INTRAVENOUS EVERY 4 HOURS PRN
Status: DISCONTINUED | OUTPATIENT
Start: 2025-02-27 | End: 2025-02-27

## 2025-02-27 RX ORDER — MORPHINE SULFATE 4 MG/ML
4 INJECTION, SOLUTION INTRAMUSCULAR; INTRAVENOUS
Status: DISCONTINUED | OUTPATIENT
Start: 2025-02-27 | End: 2025-02-28 | Stop reason: HOSPADM

## 2025-02-27 RX ORDER — SODIUM CHLORIDE 9 MG/ML
INJECTION, SOLUTION INTRAVENOUS PRN
Status: DISCONTINUED | OUTPATIENT
Start: 2025-02-27 | End: 2025-02-28 | Stop reason: HOSPADM

## 2025-02-27 RX ORDER — AMLODIPINE BESYLATE 5 MG/1
5 TABLET ORAL DAILY
Status: DISCONTINUED | OUTPATIENT
Start: 2025-02-27 | End: 2025-02-28

## 2025-02-27 RX ORDER — DEXAMETHASONE 4 MG/1
4 TABLET ORAL EVERY 6 HOURS SCHEDULED
Status: DISCONTINUED | OUTPATIENT
Start: 2025-02-27 | End: 2025-02-28 | Stop reason: HOSPADM

## 2025-02-27 RX ORDER — ATROPINE SULFATE 0.1 MG/ML
INJECTION INTRAVENOUS
Status: DISPENSED
Start: 2025-02-27 | End: 2025-02-28

## 2025-02-27 RX ORDER — POLYETHYLENE GLYCOL 3350 17 G/17G
17 POWDER, FOR SOLUTION ORAL DAILY PRN
Status: DISCONTINUED | OUTPATIENT
Start: 2025-02-27 | End: 2025-02-28 | Stop reason: HOSPADM

## 2025-02-27 RX ORDER — MAGNESIUM SULFATE IN WATER 40 MG/ML
2000 INJECTION, SOLUTION INTRAVENOUS
Status: COMPLETED | OUTPATIENT
Start: 2025-02-27 | End: 2025-02-27

## 2025-02-27 RX ORDER — POTASSIUM CHLORIDE 1500 MG/1
40 TABLET, EXTENDED RELEASE ORAL PRN
Status: DISCONTINUED | OUTPATIENT
Start: 2025-02-27 | End: 2025-02-28 | Stop reason: HOSPADM

## 2025-02-27 RX ORDER — ENOXAPARIN SODIUM 100 MG/ML
1 INJECTION SUBCUTANEOUS ONCE
Status: COMPLETED | OUTPATIENT
Start: 2025-02-27 | End: 2025-02-27

## 2025-02-27 RX ORDER — DEXAMETHASONE SODIUM PHOSPHATE 10 MG/ML
10 INJECTION, SOLUTION INTRAMUSCULAR; INTRAVENOUS ONCE
Status: COMPLETED | OUTPATIENT
Start: 2025-02-27 | End: 2025-02-27

## 2025-02-27 RX ORDER — CLOPIDOGREL BISULFATE 75 MG/1
75 TABLET ORAL DAILY
Status: DISCONTINUED | OUTPATIENT
Start: 2025-02-28 | End: 2025-02-28 | Stop reason: HOSPADM

## 2025-02-27 RX ORDER — FENTANYL CITRATE 50 UG/ML
INJECTION, SOLUTION INTRAMUSCULAR; INTRAVENOUS PRN
Status: DISCONTINUED | OUTPATIENT
Start: 2025-02-27 | End: 2025-02-27 | Stop reason: HOSPADM

## 2025-02-27 RX ORDER — HYDRALAZINE HYDROCHLORIDE 20 MG/ML
10 INJECTION INTRAMUSCULAR; INTRAVENOUS EVERY 6 HOURS PRN
Status: DISCONTINUED | OUTPATIENT
Start: 2025-02-27 | End: 2025-02-28 | Stop reason: HOSPADM

## 2025-02-27 RX ORDER — ALPRAZOLAM 0.5 MG
0.5 TABLET ORAL NIGHTLY PRN
Status: DISCONTINUED | OUTPATIENT
Start: 2025-02-27 | End: 2025-02-28 | Stop reason: HOSPADM

## 2025-02-27 RX ORDER — HEPARIN SODIUM 10000 [USP'U]/ML
INJECTION, SOLUTION INTRAVENOUS; SUBCUTANEOUS PRN
Status: DISCONTINUED | OUTPATIENT
Start: 2025-02-27 | End: 2025-02-27 | Stop reason: HOSPADM

## 2025-02-27 RX ORDER — LIDOCAINE HYDROCHLORIDE 10 MG/ML
INJECTION, SOLUTION INFILTRATION; PERINEURAL PRN
Status: DISCONTINUED | OUTPATIENT
Start: 2025-02-27 | End: 2025-02-27 | Stop reason: HOSPADM

## 2025-02-27 RX ORDER — POTASSIUM CHLORIDE 7.45 MG/ML
10 INJECTION INTRAVENOUS PRN
Status: DISCONTINUED | OUTPATIENT
Start: 2025-02-27 | End: 2025-02-28 | Stop reason: HOSPADM

## 2025-02-27 RX ORDER — IOPAMIDOL 755 MG/ML
INJECTION, SOLUTION INTRAVASCULAR PRN
Status: DISCONTINUED | OUTPATIENT
Start: 2025-02-27 | End: 2025-02-27 | Stop reason: HOSPADM

## 2025-02-27 RX ORDER — ACETAMINOPHEN 325 MG/1
650 TABLET ORAL EVERY 4 HOURS PRN
Status: DISCONTINUED | OUTPATIENT
Start: 2025-02-27 | End: 2025-02-28 | Stop reason: HOSPADM

## 2025-02-27 RX ORDER — HYDRALAZINE HYDROCHLORIDE 20 MG/ML
10 INJECTION INTRAMUSCULAR; INTRAVENOUS ONCE
Status: COMPLETED | OUTPATIENT
Start: 2025-02-27 | End: 2025-02-27

## 2025-02-27 RX ORDER — ACETAMINOPHEN 650 MG/1
650 SUPPOSITORY RECTAL EVERY 6 HOURS PRN
Status: DISCONTINUED | OUTPATIENT
Start: 2025-02-27 | End: 2025-02-28 | Stop reason: HOSPADM

## 2025-02-27 RX ORDER — SODIUM CHLORIDE 0.9 % (FLUSH) 0.9 %
5-40 SYRINGE (ML) INJECTION PRN
Status: DISCONTINUED | OUTPATIENT
Start: 2025-02-27 | End: 2025-02-28 | Stop reason: HOSPADM

## 2025-02-27 RX ORDER — CLOPIDOGREL 300 MG/1
TABLET, FILM COATED ORAL PRN
Status: DISCONTINUED | OUTPATIENT
Start: 2025-02-27 | End: 2025-02-27 | Stop reason: HOSPADM

## 2025-02-27 RX ORDER — SODIUM CHLORIDE 0.9 % (FLUSH) 0.9 %
5-40 SYRINGE (ML) INJECTION EVERY 12 HOURS SCHEDULED
Status: DISCONTINUED | OUTPATIENT
Start: 2025-02-27 | End: 2025-02-28 | Stop reason: HOSPADM

## 2025-02-27 RX ORDER — ACETAMINOPHEN 325 MG/1
650 TABLET ORAL EVERY 6 HOURS SCHEDULED
Status: DISCONTINUED | OUTPATIENT
Start: 2025-02-27 | End: 2025-02-28 | Stop reason: HOSPADM

## 2025-02-27 RX ORDER — IOPAMIDOL 755 MG/ML
100 INJECTION, SOLUTION INTRAVASCULAR
Status: COMPLETED | OUTPATIENT
Start: 2025-02-27 | End: 2025-02-27

## 2025-02-27 RX ORDER — KETOROLAC TROMETHAMINE 30 MG/ML
15 INJECTION, SOLUTION INTRAMUSCULAR; INTRAVENOUS EVERY 6 HOURS PRN
Status: DISCONTINUED | OUTPATIENT
Start: 2025-02-27 | End: 2025-02-28 | Stop reason: HOSPADM

## 2025-02-27 RX ORDER — LISINOPRIL 10 MG/1
10 TABLET ORAL DAILY
Status: DISCONTINUED | OUTPATIENT
Start: 2025-02-27 | End: 2025-02-28 | Stop reason: HOSPADM

## 2025-02-27 RX ORDER — BIVALIRUDIN 250 MG/5ML
INJECTION, POWDER, LYOPHILIZED, FOR SOLUTION INTRAVENOUS PRN
Status: DISCONTINUED | OUTPATIENT
Start: 2025-02-27 | End: 2025-02-27 | Stop reason: HOSPADM

## 2025-02-27 RX ORDER — ROPINIROLE 0.25 MG/1
0.5 TABLET, FILM COATED ORAL 3 TIMES DAILY
Status: DISCONTINUED | OUTPATIENT
Start: 2025-02-27 | End: 2025-02-28 | Stop reason: HOSPADM

## 2025-02-27 RX ORDER — ASPIRIN 81 MG/1
81 TABLET, CHEWABLE ORAL DAILY
Status: DISCONTINUED | OUTPATIENT
Start: 2025-02-27 | End: 2025-02-28 | Stop reason: HOSPADM

## 2025-02-27 RX ORDER — ACETAMINOPHEN 325 MG/1
650 TABLET ORAL EVERY 6 HOURS PRN
Status: DISCONTINUED | OUTPATIENT
Start: 2025-02-27 | End: 2025-02-28 | Stop reason: HOSPADM

## 2025-02-27 RX ORDER — ONDANSETRON 2 MG/ML
4 INJECTION INTRAMUSCULAR; INTRAVENOUS EVERY 6 HOURS PRN
Status: DISCONTINUED | OUTPATIENT
Start: 2025-02-27 | End: 2025-02-28 | Stop reason: HOSPADM

## 2025-02-27 RX ORDER — ASPIRIN 81 MG/1
324 TABLET, CHEWABLE ORAL ONCE
Status: COMPLETED | OUTPATIENT
Start: 2025-02-27 | End: 2025-02-27

## 2025-02-27 RX ORDER — MAGNESIUM SULFATE IN WATER 40 MG/ML
2000 INJECTION, SOLUTION INTRAVENOUS PRN
Status: DISCONTINUED | OUTPATIENT
Start: 2025-02-27 | End: 2025-02-28 | Stop reason: HOSPADM

## 2025-02-27 RX ORDER — ONDANSETRON 4 MG/1
4 TABLET, ORALLY DISINTEGRATING ORAL EVERY 8 HOURS PRN
Status: DISCONTINUED | OUTPATIENT
Start: 2025-02-27 | End: 2025-02-28 | Stop reason: HOSPADM

## 2025-02-27 RX ADMIN — AMLODIPINE BESYLATE 5 MG: 5 TABLET ORAL at 09:02

## 2025-02-27 RX ADMIN — ASPIRIN 81 MG: 81 TABLET, CHEWABLE ORAL at 09:01

## 2025-02-27 RX ADMIN — ENOXAPARIN SODIUM 90 MG: 100 INJECTION SUBCUTANEOUS at 01:14

## 2025-02-27 RX ADMIN — ACETAMINOPHEN 650 MG: 325 TABLET ORAL at 07:40

## 2025-02-27 RX ADMIN — GABAPENTIN 400 MG: 100 CAPSULE ORAL at 21:28

## 2025-02-27 RX ADMIN — SODIUM CHLORIDE, PRESERVATIVE FREE 10 ML: 5 INJECTION INTRAVENOUS at 09:00

## 2025-02-27 RX ADMIN — KETOROLAC TROMETHAMINE 15 MG: 30 INJECTION, SOLUTION INTRAMUSCULAR at 21:27

## 2025-02-27 RX ADMIN — Medication 3 MG: at 21:28

## 2025-02-27 RX ADMIN — ALPRAZOLAM 0.5 MG: 0.5 TABLET ORAL at 23:51

## 2025-02-27 RX ADMIN — BIVALIRUDIN 0.5 MG/KG/HR: 250 INJECTION, POWDER, LYOPHILIZED, FOR SOLUTION INTRAVENOUS at 16:33

## 2025-02-27 RX ADMIN — HYDRALAZINE HYDROCHLORIDE 10 MG: 20 INJECTION INTRAMUSCULAR; INTRAVENOUS at 16:40

## 2025-02-27 RX ADMIN — MORPHINE SULFATE 4 MG: 4 INJECTION, SOLUTION INTRAMUSCULAR; INTRAVENOUS at 17:49

## 2025-02-27 RX ADMIN — DEXAMETHASONE 4 MG: 4 TABLET ORAL at 05:26

## 2025-02-27 RX ADMIN — ROPINIROLE HYDROCHLORIDE 0.5 MG: 0.25 TABLET, FILM COATED ORAL at 10:48

## 2025-02-27 RX ADMIN — ACETAMINOPHEN 650 MG: 325 TABLET ORAL at 12:22

## 2025-02-27 RX ADMIN — SODIUM CHLORIDE, PRESERVATIVE FREE 10 ML: 5 INJECTION INTRAVENOUS at 21:00

## 2025-02-27 RX ADMIN — GABAPENTIN 400 MG: 100 CAPSULE ORAL at 09:00

## 2025-02-27 RX ADMIN — ACETAMINOPHEN 650 MG: 325 TABLET ORAL at 23:51

## 2025-02-27 RX ADMIN — MAGNESIUM SULFATE HEPTAHYDRATE 2000 MG: 40 INJECTION, SOLUTION INTRAVENOUS at 00:59

## 2025-02-27 RX ADMIN — DEXAMETHASONE SODIUM PHOSPHATE 10 MG: 10 INJECTION, SOLUTION INTRAMUSCULAR; INTRAVENOUS at 00:28

## 2025-02-27 RX ADMIN — DEXAMETHASONE 4 MG: 4 TABLET ORAL at 12:22

## 2025-02-27 RX ADMIN — KETOROLAC TROMETHAMINE 15 MG: 30 INJECTION, SOLUTION INTRAMUSCULAR at 16:10

## 2025-02-27 RX ADMIN — DEXAMETHASONE 4 MG: 4 TABLET ORAL at 23:51

## 2025-02-27 RX ADMIN — SODIUM CHLORIDE, SODIUM LACTATE, POTASSIUM CHLORIDE, AND CALCIUM CHLORIDE 1000 ML: .6; .31; .03; .02 INJECTION, SOLUTION INTRAVENOUS at 00:28

## 2025-02-27 RX ADMIN — ONDANSETRON 4 MG: 2 INJECTION INTRAMUSCULAR; INTRAVENOUS at 17:47

## 2025-02-27 RX ADMIN — ROPINIROLE HYDROCHLORIDE 0.5 MG: 0.25 TABLET, FILM COATED ORAL at 21:28

## 2025-02-27 RX ADMIN — LISINOPRIL 10 MG: 10 TABLET ORAL at 09:02

## 2025-02-27 RX ADMIN — ASPIRIN 324 MG: 81 TABLET, CHEWABLE ORAL at 01:18

## 2025-02-27 RX ADMIN — IOPAMIDOL 100 ML: 755 INJECTION, SOLUTION INTRAVENOUS at 00:42

## 2025-02-27 ASSESSMENT — PAIN SCALES - GENERAL
PAINLEVEL_OUTOF10: 10
PAINLEVEL_OUTOF10: 3
PAINLEVEL_OUTOF10: 3
PAINLEVEL_OUTOF10: 7
PAINLEVEL_OUTOF10: 3
PAINLEVEL_OUTOF10: 1
PAINLEVEL_OUTOF10: 2

## 2025-02-27 ASSESSMENT — PAIN DESCRIPTION - DESCRIPTORS
DESCRIPTORS: ACHING

## 2025-02-27 ASSESSMENT — PAIN DESCRIPTION - FREQUENCY
FREQUENCY: CONTINUOUS
FREQUENCY: INTERMITTENT

## 2025-02-27 ASSESSMENT — PAIN DESCRIPTION - LOCATION
LOCATION: LEG;HIP
LOCATION: BACK
LOCATION: HIP
LOCATION: BACK
LOCATION: BACK

## 2025-02-27 ASSESSMENT — PAIN DESCRIPTION - ORIENTATION
ORIENTATION: MID
ORIENTATION: MID
ORIENTATION: LEFT

## 2025-02-27 ASSESSMENT — PAIN - FUNCTIONAL ASSESSMENT
PAIN_FUNCTIONAL_ASSESSMENT: ACTIVITIES ARE NOT PREVENTED
PAIN_FUNCTIONAL_ASSESSMENT: PREVENTS OR INTERFERES SOME ACTIVE ACTIVITIES AND ADLS

## 2025-02-27 ASSESSMENT — PAIN DESCRIPTION - ONSET
ONSET: ON-GOING
ONSET: ON-GOING

## 2025-02-27 ASSESSMENT — PAIN DESCRIPTION - PAIN TYPE
TYPE: CHRONIC PAIN
TYPE: CHRONIC PAIN

## 2025-02-27 NOTE — ED NOTES
Called [facility] @131a for consult.... Reason: new left bundle; elevated troponin.  Dr Miller is on-call

## 2025-02-27 NOTE — H&P
89 degrees    Diagnosis       Normal sinus rhythm  Right atrial enlargement  Left axis deviation  Left bundle branch block  Abnormal ECG  When compared with ECG of 27-FEB-2025 00:27,  MANUAL COMPARISON REQUIRED, DATA IS UNCONFIRMED           CT Result (most recent):  CTA CHEST ABDOMEN PELVIS W CONTRAST 02/27/2025    Narrative  INDICATION: nstemi, abd pain, risk for dissection    COMPARISON: CT abdomen pelvis 2/26/2025.    TECHNIQUE: Helical CT angiography of the chest, abdomen, and pelvis. Coronal and  sagittal reconstructions were generated. CT dose reduction was achieved through  use of a standardized protocol tailored for this examination and automatic  exposure control for dose modulation. Three-dimensional postprocessing was  performed.    IV CONTRAST: 100 mL of Isovue 370    ORAL CONTRAST: None.    FINDINGS:  MEDIASTINUM: No mass or lymphadenopathy.  SABRINA: No mass or lymphadenopathy.  THORACIC AORTA: Mild atherosclerotic calcification and irregularity with  borderline 4.0 cm ascending aortic aneurysm and no dissection.  MAIN PULMONARY ARTERY: Normal in caliber. No central embolism.  TRACHEA/BRONCHI: Patent.  ESOPHAGUS: No wall thickening or dilatation.  HEART: The heart is normal in size without pericardial effusion. Coronary artery  calcifications are noted.  PLEURA: No effusion or pneumothorax.  LUNGS: Minimal dependent atelectasis with no nodule, mass, or airspace disease  otherwise.    ABDOMINAL AORTA: Atherosclerotic calcification with no aneurysm or dissection.  The mesenteric arteries are patent. Renal arteries are patent. Aortic  bifurcation is within normal limits. Iliac arteries are patent.  LIVER: No mass or biliary dilatation.  GALLBLADDER: Unremarkable.  SPLEEN: No mass.  PANCREAS: No mass or ductal dilatation.  ADRENALS: Unremarkable.  KIDNEYS: Simple fluid density cyst requiring no follow-up with no enhancing  mass, hydronephrosis, or identified calculi. No ureteral dilation or  calculi.  STOMACH: Unremarkable.  SMALL BOWEL: No dilatation or wall thickening.  COLON: No dilation or wall thickening. Noninflamed appearing left and sigmoid  colon diverticula.  APPENDIX: Unremarkable.    PERITONEUM: No ascites or pneumoperitoneum.  RETROPERITONEUM: No lymphadenopathy or hemorrhage.    REPRODUCTIVE ORGANS: Prostate and seminal vesicles are unremarkable.  URINARY BLADDER: No mass or calculus.  BONES: Degenerative spine change. Osteoarthritic change of the hips and  shoulders. Posterior jose and screw fixation hardware of the lumbar spine. No  acute fracture or aggressive lesion..  ADDITIONAL COMMENTS: N/A    Impression  No dissection or other acute abnormality to correlate for abdominal  pain. Incidentals as above including colonic diverticulosis and coronary artery  disease.    Electronically signed by Ismael Casey        Xray Result (most recent):  XR ANKLE LEFT (MIN 3 VIEWS) 08/06/2024    Narrative  Left ankle standing AP, lateral and oblique x-rays show no acute fractures or dislocations, no acute abnormalities.  There is minimal degenerative changes.  There is moderate size Jevon's and posterior bone spur off the calcaneus.  Satisfactory bone density.        _______________________________________________________________________    TOTAL TIME:  75 Minutes   TOBACCO CESSATION COUNSELING: No    Critical Care Provided: Yes  (Minutes non procedure based)    Justification for critical care billing:  ACS    I have spent  45 minutes of critical care time involved in lab review, consultations with specialist, family decision- making, bedside attention and documentation. During this entire length of time I was immediately available to the patient .     Critical Care:  The reason for providing this level of medical care for this critically ill patient was due to a critical illness that impaired one or more vital organ systems, such that there was a high probability of imminent or life threatening

## 2025-02-27 NOTE — BRIEF OP NOTE
Brief Postoperative Note      Patient: Samy Hunter Jr  YOB: 1935  MRN: [id number]    Date of Procedure: 2/27/2025    Pre-Op Diagnosis Codes:      * NSTEMI (non-ST elevated myocardial infarction) (HCC) [I21.4]    Post-Op Diagnosis:  PTCA/PCI of the LAD       Procedure(s):  Left heart cath / coronary angiography  Coronary angiography  Percutaneous coronary intervention  Insert stent johnie coronary    Surgeon(s):  Felton Miller MD Mueller, George H III, DO    Assistant:  * No surgical staff found *    Anesthesia: IV Sedation    Estimated Blood Loss (mL): Minimal    Complications: None    Specimens:   * No specimens in log *    Implants:  Implant Name Type Inv. Item Serial No.  Lot No. LRB No. Used Action   STENT CORONARY YUE FRONTIER RX 3X34 MM ZOTAROLIMUS ELUT - [id number] Coronary stents STENT CORONARY YUE FRONTIER RX 3X34 MM ZOTAROLIMUS ELUT  [employer]- [id number] N/A 1 Implanted         Drains: * No LDAs found *    Findings:  Infection Present At Time Of Surgery (PATOS) (choose all levels that have infection present):  No infection present  Other Findings: as above    Electronically signed by Varghese Hartmann DO on 2/27/2025 at 3:37 PM

## 2025-02-27 NOTE — PLAN OF CARE
Saw at bedside prior to cath. No complaints. Appreciate Cardiology assistance. Agree with rest of plan per Dr. Bustos.    David L Mendel, MD

## 2025-02-27 NOTE — PROGRESS NOTES
OCCUPATIONAL THERAPY EVALUATION/DISCHARGE  Patient: Samy Hunter Jr (89 y.o. 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  Pt is Ione but oriented x4.  He has been admitted for MI and LBP.  Pt reports he was putting together a TV cabinet prior and twisting which caused back pain.  Pt is able to mobilize at a initial CGA level that progressed to independence over time.  He is performing ADLs at a independent to SBA level overall that is related to his back pain.  Pt is in agreement that further OT services aren't needed at this time.  Will sign off.         02/27/25 0926 02/27/25 0930 02/27/25 0933     Vital Signs   Pulse 82 87 98   BP (!) 162/83 (!) 170/92 (!) 176/76   MAP (Calculated) 109 118 109   BP Location Right upper arm Right upper arm Right upper arm   BP Method Automatic Automatic Automatic   Patient Position Supine Sitting Standing   Oxygen Therapy   SpO2 96 %  --   --    O2 Device None (Room air)  --   --         Functional Outcome Measure:  The patient scored 75/100 on the barthel outcome measure which is indicative of minimal independence in mobility and ADLS.      Further skilled acute occupational therapy is not indicated at this time.     PLAN :  Recommend with staff: mobilize    Recommendation for discharge: (in order for the patient to meet his/her long term goals):   No skilled occupational therapy    Other factors to consider for discharge: no additional factors    IF patient  Independent            LE Bathing: Stand by assistance (due to left sided low back and hip pain)       UE Dressing: Independent       LE Dressing: Stand by assistance (due to pain)       Toileting: Supervision                            ADL Intervention and task modifications:    Educated on pacing and fall prevention. Educated pt on the importance of continuing to mobilize with nursing and being out of bed to chair as much as possible with assist to retain his independence.  Pt voiced understanding.           Barthel Index:    Barthel Index   Feeding: Independent, Able to apply any necessary device. Feeds in reasonable time  Bathing: Cannot perform activity  Grooming: Washes face, severino hair, brushes teeth, shaves (manages plug if electric razor)  Dressing: Needs help, but does at least half of task within reasonable time  Bowel Control: No accidents. Able to use enema or suppository if needed  Bladder Control: No accidents. Able to care for collecting device, if used  Toilet Transfers: Independent with toilet or bedpan. Handles clothes, wipes, flushes or cleans lopez  Chair/Bed Trannsfers: Minimum assistance or supervision required  Ambulation: With help for 50 yards  Stairs: Needs help or supervision  Total Barthel Index Score: 75            The Barthel ADL Index: Guidelines  1. The index should be used as a record of what a patient does, not as a record of what a patient could do.  2. The main aim is to establish degree of independence from any help, physical or verbal, however minor and for whatever reason.  3. The need for supervision renders the patient not independent.  4. A patient's performance should be established using the best available evidence. Asking the patient, friends/relatives and nurses are the usual sources, but direct observation and common sense are also important. However direct testing is not needed.  5. Usually the patient's performance over the preceding 24-48 hours is important, but

## 2025-02-27 NOTE — PROGRESS NOTES
Physical Therapy    Orders received, chart reviewed and patient evaluated by physical therapy. Pending progression with skilled acute physical therapy, recommend:    No skilled physical therapy    Recommend with nursing OOB to chair 3x/day and walking daily with 1 assist and  none . Thank you for completing as able in order to maintain patient strength, endurance and independence.     Full evaluation to follow.     02/27/25 0926 02/27/25 0930 02/27/25 0933   Vital Signs   Pulse 82 87 98   BP (!) 162/83 (!) 170/92 (!) 176/76   MAP (Calculated) 109 118 109   BP Location Right upper arm Right upper arm Right upper arm   BP Method Automatic Automatic Automatic   Patient Position Supine Sitting Standing   Oxygen Therapy   SpO2 96 %  --   --    O2 Device None (Room air)  --   --       02/27/25 0937   Vital Signs   Pulse 92   BP (!) 161/75   MAP (Calculated) 104   BP Location Right upper arm   BP Method Automatic   Patient Position Standing;Other (Comment)  (post amb)   Oxygen Therapy   SpO2 96 %   O2 Device None (Room air)

## 2025-02-27 NOTE — ED PROVIDER NOTES
seen earlier today    Mri - no infection, myelomalacia low thoracic spine, severe stenosis, sp L2-S1 posterior spinal fusion and decompression   Ct abd pelv - no acute findings  Labs - trop elevated, acutely so. Renal function nml; lactate slightly  up; no evidence of sepsis, elevation likely due to dehydration     Pain now controled. Nausea improved.  Cta chest/abd/pelv to rule out dissection although not very likely. Asa and lovenox for nstemi tx after normal scan    PAST HISTORY     Past Medical History:  Past Medical History:   Diagnosis Date    Allergic rhinitis 12/15/2017    Arthritis of knee, left 12/15/2017    Aseptic meningitis     Chronic pain     chronic back pain    Colon polyps     Constipation 12/15/2017    Dysphagia 12/15/2017    Elevated blood pressure reading 12/15/2017    Fatigue 12/15/2017    Headache 12/15/2017    Hematuria 12/15/2017    Herpes zoster dermatitis 12/15/2017    Hyperkalemia 12/15/2017    Hypertension 12/15/2017    Insomnia 12/15/2017    Osteoarthritis 12/15/2017    Otitis externa 12/15/2017    Prostate cancer screening 12/15/2017    Right groin hernia     Sciatica 12/15/2017    Sciatica of left side associated with disorder of lumbosacral spine 12/15/2017    Shingles     Spinal stenosis 12/15/2017       Past Surgical History:  Past Surgical History:   Procedure Laterality Date    CATARACT REMOVAL Bilateral     with lens implants    COLONOSCOPY      COLONOSCOPY N/A 11/29/2018    COLONOSCOPY performed by Julito Hamilton MD at [facility] ENDOSCOPY    COLONOSCOPY,DIAGNOSTIC  11/29/2018         COLONOSCOPY,DIAGNOSTIC  11/11/2014         EGD INSERT GUIDE WIRE DILATOR PASSAGE ESOPHAGUS  10/17/2013         EGD TRANSORAL BIOPSY SINGLE/MULTIPLE  10/17/2013         HERNIA REPAIR  06/05/2019    HERNIA REPAIR Left     ORTHOPEDIC SURGERY  08/2018    jose replacement, by Dr. Rosen    ORTHOPEDIC SURGERY  10/6/15    LEFT L5-S1 MICRODISCECTOMY     ORTHOPEDIC SURGERY  2008    rods placed in back    OTHER    Result Value Ref Range    WBC 7.3 4.1 - 11.1 K/uL    RBC 4.44 4.10 - 5.70 M/uL    Hemoglobin 14.2 12.1 - 17.0 g/dL    Hematocrit 42.6 36.6 - 50.3 %    MCV 95.9 80.0 - 99.0 FL    MCH 32.0 26.0 - 34.0 PG    MCHC 33.3 30.0 - 36.5 g/dL    RDW 12.5 11.5 - 14.5 %    Platelets 213 150 - 400 K/uL    MPV 9.8 8.9 - 12.9 FL    Nucleated RBCs 0.0 0  WBC    nRBC 0.00 0.00 - 0.01 K/uL    Neutrophils % 62.2 32.0 - 75.0 %    Lymphocytes % 27.1 12.0 - 49.0 %    Monocytes % 9.0 5.0 - 13.0 %    Eosinophils % 1.2 0.0 - 7.0 %    Basophils % 0.4 0.0 - 1.0 %    Immature Granulocytes % 0.1 0.0 - 0.5 %    Neutrophils Absolute 4.56 1.80 - 8.00 K/UL    Lymphocytes Absolute 1.99 0.80 - 3.50 K/UL    Monocytes Absolute 0.66 0.00 - 1.00 K/UL    Eosinophils Absolute 0.09 0.00 - 0.40 K/UL    Basophils Absolute 0.03 0.00 - 0.10 K/UL    Immature Granulocytes Absolute 0.01 0.00 - 0.04 K/UL    Differential Type AUTOMATED     Comprehensive Metabolic Panel    Collection Time: 02/26/25  8:26 AM   Result Value Ref Range    Sodium 139 136 - 145 mmol/L    Potassium 4.1 3.5 - 5.1 mmol/L    Chloride 105 97 - 108 mmol/L    CO2 30 21 - 32 mmol/L    Anion Gap 4 2 - 12 mmol/L    Glucose 107 (H) 65 - 100 mg/dL    BUN 16 6 - 20 MG/DL    Creatinine 0.91 0.70 - 1.30 MG/DL    BUN/Creatinine Ratio 18 12 - 20      Est, Glom Filt Rate 81 >60 ml/min/1.73m2    Calcium 9.4 8.5 - 10.1 MG/DL    Total Bilirubin 0.7 0.2 - 1.0 MG/DL    ALT 15 12 - 78 U/L    AST 15 15 - 37 U/L    Alk Phosphatase 87 45 - 117 U/L    Total Protein 6.9 6.4 - 8.2 g/dL    Albumin 4.0 3.5 - 5.0 g/dL    Globulin 2.9 2.0 - 4.0 g/dL    Albumin/Globulin Ratio 1.4 1.1 - 2.2     Troponin    Collection Time: 02/26/25  8:26 AM   Result Value Ref Range    Troponin, High Sensitivity 9 0 - 76 ng/L   COVID-19 & Influenza Combo    Collection Time: 02/26/25  7:41 PM    Specimen: Nasopharyngeal   Result Value Ref Range    Source Nasopharyngeal      SARS-CoV-2, PCR Not detected NOTD      Rapid Influenza A By

## 2025-02-27 NOTE — PROGRESS NOTES
PHYSICAL THERAPY EVALUATION/DISCHARGE    Patient: Samy Hunter Jr (89 y.o. male)  Date: 2/27/2025  Primary Diagnosis: NSTEMI (non-ST elevated myocardial infarction) ([facility]) [I21.4]       Precautions:                        ASSESSMENT AND RECOMMENDATIONS:  Based on the objective data below, the patient presents with NSTEMI and finding of LBBB. He has no current c/o. He showed good mobility throughout, amb without difficulty in escoto, no LOB or path deviation. He was able to ascend/descend 2 steps with rail with CGA (using cardiac stool in ED). No SOB or CP. Vitals below. 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 He as pain radiating to L hip juan in sitting rated at 8/10. RN aware. Pt has no current PT needs. He has very supportive family. Should mobilize with staff throughout stay and be up for all meals.    02/27/25 0926 02/27/25 0930 02/27/25 0933    Vital Signs   Pulse 82 87 98   BP (!) 162/83 (!) 170/92 (!) 176/76   MAP (Calculated) 109 118 109   BP Location Right upper arm Right upper arm Right upper arm   BP Method Automatic Automatic Automatic   Patient Position Supine Sitting Standing   Oxygen Therapy   SpO2 96 %  --   --    O2 Device None (Room air)  --   --      Functional Outcome Measure:  The patient scored 23/24 on the [facility] outcome measure          Further skilled acute physical therapy is not indicated at this time.       PLAN :  Recommendation for discharge: (in order for the patient to meet his/her long term goals):   No skilled physical therapy    Other factors to consider for discharge: no additional factors    IF patient discharges home will need the following DME: none       SUBJECTIVE:   Patient stated “I feel better.”    OBJECTIVE DATA SUMMARY:     Past Medical History:   Diagnosis Date    Allergic rhinitis 12/15/2017    Arthritis of knee, left 12/15/2017    Aseptic meningitis     Chronic pain     chronic back pain

## 2025-02-27 NOTE — CONSULTS
Pt seen and examined. Full consult dicacted    New LBBB with mild troponin elevation.     Please keep pt NPO    Plan cath this afternoon

## 2025-02-27 NOTE — CONSULTS
[facility]              [address] [place] [address]                              CONSULTATION      PATIENT NAME: AUDIE ELIAS JR         : 1935  MED REC NO: [id number]                       ROOM: 2214  ACCOUNT NO: [id number]                       ADMIT DATE: 2025  PROVIDER: Felton Miller MD    DATE OF SERVICE:  2025    ATTENDING PHYSICIAN:  David L Mendel, MD    REASON FOR CONSULTATION:  Abnormal troponin, left bundle-branch block.    CHIEF COMPLAINT:  Back pain.    HISTORY OF PRESENT ILLNESS:  The patient is an 89-year-old man without prior history of coronary artery disease.  He does have a history of hypertension and chronic lower back pain with a prior lumbar laminectomy.  He presented to the [facility] ER last night with back pain.  He did not have any active chest pain or dyspnea.  His initial troponin was within normal limits.  The patient underwent evaluation.  He had been previously seen in the emergency room and by his PCP earlier this week with ongoing back discomfort.  He subsequently developed nausea and vomiting, but no chest pain.  Repeat cardiac enzymes were done and he had a gradual increase in his troponin from 9 to 111 to 142.  ProBNP was 1731.  His EKG initially showed sinus rhythm with some PVCs.  Repeat EKG showed a left bundle-branch block.  He had a CTA done of the chest and abdomen, which showed no evidence of dissection or pulmonary embolism.  He received Lovenox and I was asked to see the patient for evaluation.  Currently, the patient denies chest discomfort.  He is feeling better.  His family is at his bedside.  He is apparently normally quite physically active, but over the last week, he has had elevated blood pressure, fever, and worsening back pain.  He saw his PCP, Dr. Petersen on  and was prescribed Neurontin.  Lisinopril was added for hypertension.  No prior history of left bundle-branch

## 2025-02-27 NOTE — ED NOTES
Bedside report given to LORETTA Mckeon by LORETTA Zepeda. Nurse was informed of reason for arrival, vitals, labs, medications, orders, procedures, results, cardiac rhythm, any outstanding and pending orders and plan of care. Opportunity for questions were provided for receiving RN at this time.

## 2025-02-28 ENCOUNTER — APPOINTMENT (OUTPATIENT)
Facility: HOSPITAL | Age: 89
DRG: 322 | End: 2025-02-28
Attending: STUDENT IN AN ORGANIZED HEALTH CARE EDUCATION/TRAINING PROGRAM
Payer: MEDICARE

## 2025-02-28 VITALS
TEMPERATURE: 98 F | BODY MASS INDEX: 24.59 KG/M2 | HEIGHT: 74 IN | RESPIRATION RATE: 15 BRPM | DIASTOLIC BLOOD PRESSURE: 78 MMHG | SYSTOLIC BLOOD PRESSURE: 163 MMHG | OXYGEN SATURATION: 95 % | HEART RATE: 77 BPM | WEIGHT: 191.58 LBS

## 2025-02-28 DIAGNOSIS — I21.4 NSTEMI (NON-ST ELEVATION MYOCARDIAL INFARCTION) (HCC): Primary | ICD-10-CM

## 2025-02-28 LAB
ANION GAP SERPL CALC-SCNC: 4 MMOL/L (ref 2–12)
BUN SERPL-MCNC: 30 MG/DL (ref 6–20)
BUN/CREAT SERPL: 30 (ref 12–20)
CALCIUM SERPL-MCNC: 9 MG/DL (ref 8.5–10.1)
CHLORIDE SERPL-SCNC: 107 MMOL/L (ref 97–108)
CO2 SERPL-SCNC: 26 MMOL/L (ref 21–32)
CREAT SERPL-MCNC: 1 MG/DL (ref 0.7–1.3)
ECHO BSA: 2.13 M2
ECHO LV EDV A4C: 112 ML
ECHO LV EDV INDEX A4C: 53 ML/M2
ECHO LV EF PHYSICIAN: 56 %
ECHO LV EJECTION FRACTION A4C: 56 %
ECHO LV ESV A4C: 49 ML
ECHO LV ESV INDEX A4C: 23 ML/M2
ECHO LV FRACTIONAL SHORTENING: 38 % (ref 28–44)
ECHO LV INTERNAL DIMENSION DIASTOLE INDEX: 2.11 CM/M2
ECHO LV INTERNAL DIMENSION DIASTOLIC: 4.5 CM (ref 4.2–5.9)
ECHO LV INTERNAL DIMENSION SYSTOLIC INDEX: 1.31 CM/M2
ECHO LV INTERNAL DIMENSION SYSTOLIC: 2.8 CM
ECHO LV IVSD: 1.1 CM (ref 0.6–1)
ECHO LV MASS 2D: 175 G (ref 88–224)
ECHO LV MASS INDEX 2D: 82.2 G/M2 (ref 49–115)
ECHO LV POSTERIOR WALL DIASTOLIC: 1.1 CM (ref 0.6–1)
ECHO LV RELATIVE WALL THICKNESS RATIO: 0.49
ECHO LVOT AREA: 2.5 CM2
ECHO LVOT DIAM: 1.8 CM
ERYTHROCYTE [DISTWIDTH] IN BLOOD BY AUTOMATED COUNT: 12.8 % (ref 11.5–14.5)
GLUCOSE SERPL-MCNC: 133 MG/DL (ref 65–100)
HCT VFR BLD AUTO: 41.9 % (ref 36.6–50.3)
HGB BLD-MCNC: 14 G/DL (ref 12.1–17)
MCH RBC QN AUTO: 31.3 PG (ref 26–34)
MCHC RBC AUTO-ENTMCNC: 33.4 G/DL (ref 30–36.5)
MCV RBC AUTO: 93.5 FL (ref 80–99)
NRBC # BLD: 0 K/UL (ref 0–0.01)
NRBC BLD-RTO: 0 PER 100 WBC
PLATELET # BLD AUTO: 242 K/UL (ref 150–400)
PMV BLD AUTO: 10.3 FL (ref 8.9–12.9)
POTASSIUM SERPL-SCNC: 4.3 MMOL/L (ref 3.5–5.1)
RBC # BLD AUTO: 4.48 M/UL (ref 4.1–5.7)
SODIUM SERPL-SCNC: 137 MMOL/L (ref 136–145)
WBC # BLD AUTO: 18.2 K/UL (ref 4.1–11.1)

## 2025-02-28 PROCEDURE — 85027 COMPLETE CBC AUTOMATED: CPT

## 2025-02-28 PROCEDURE — 93321 DOPPLER ECHO F-UP/LMTD STD: CPT

## 2025-02-28 PROCEDURE — 6370000000 HC RX 637 (ALT 250 FOR IP): Performed by: NURSE PRACTITIONER

## 2025-02-28 PROCEDURE — 36415 COLL VENOUS BLD VENIPUNCTURE: CPT

## 2025-02-28 PROCEDURE — 93308 TTE F-UP OR LMTD: CPT

## 2025-02-28 PROCEDURE — 6360000002 HC RX W HCPCS: Performed by: STUDENT IN AN ORGANIZED HEALTH CARE EDUCATION/TRAINING PROGRAM

## 2025-02-28 PROCEDURE — 6360000004 HC RX CONTRAST MEDICATION: Performed by: INTERNAL MEDICINE

## 2025-02-28 PROCEDURE — 6370000000 HC RX 637 (ALT 250 FOR IP): Performed by: STUDENT IN AN ORGANIZED HEALTH CARE EDUCATION/TRAINING PROGRAM

## 2025-02-28 PROCEDURE — 80048 BASIC METABOLIC PNL TOTAL CA: CPT

## 2025-02-28 PROCEDURE — 6370000000 HC RX 637 (ALT 250 FOR IP): Performed by: INTERNAL MEDICINE

## 2025-02-28 RX ORDER — METOPROLOL SUCCINATE 25 MG/1
12.5 TABLET, EXTENDED RELEASE ORAL DAILY
Qty: 30 TABLET | Refills: 3 | Status: SHIPPED | OUTPATIENT
Start: 2025-03-01

## 2025-02-28 RX ORDER — AMLODIPINE BESYLATE 2.5 MG/1
2.5 TABLET ORAL DAILY
Qty: 30 TABLET | Refills: 3 | Status: SHIPPED | OUTPATIENT
Start: 2025-03-01

## 2025-02-28 RX ORDER — METOPROLOL SUCCINATE 25 MG/1
12.5 TABLET, EXTENDED RELEASE ORAL DAILY
Status: DISCONTINUED | OUTPATIENT
Start: 2025-02-28 | End: 2025-02-28 | Stop reason: HOSPADM

## 2025-02-28 RX ORDER — ASPIRIN 81 MG/1
81 TABLET, CHEWABLE ORAL DAILY
Qty: 30 TABLET | Refills: 3 | Status: SHIPPED | OUTPATIENT
Start: 2025-03-01

## 2025-02-28 RX ORDER — AMLODIPINE BESYLATE 2.5 MG/1
2.5 TABLET ORAL DAILY
Status: DISCONTINUED | OUTPATIENT
Start: 2025-02-28 | End: 2025-02-28 | Stop reason: HOSPADM

## 2025-02-28 RX ORDER — ATORVASTATIN CALCIUM 40 MG/1
40 TABLET, FILM COATED ORAL NIGHTLY
Qty: 30 TABLET | Refills: 3 | Status: SHIPPED | OUTPATIENT
Start: 2025-02-28

## 2025-02-28 RX ORDER — ATORVASTATIN CALCIUM 40 MG/1
40 TABLET, FILM COATED ORAL NIGHTLY
Status: DISCONTINUED | OUTPATIENT
Start: 2025-02-28 | End: 2025-02-28 | Stop reason: HOSPADM

## 2025-02-28 RX ORDER — CLOPIDOGREL BISULFATE 75 MG/1
75 TABLET ORAL DAILY
Qty: 30 TABLET | Refills: 3 | Status: SHIPPED | OUTPATIENT
Start: 2025-03-01

## 2025-02-28 RX ADMIN — LISINOPRIL 10 MG: 10 TABLET ORAL at 10:16

## 2025-02-28 RX ADMIN — CLOPIDOGREL BISULFATE 75 MG: 75 TABLET ORAL at 10:15

## 2025-02-28 RX ADMIN — ACETAMINOPHEN 650 MG: 325 TABLET ORAL at 06:28

## 2025-02-28 RX ADMIN — ONDANSETRON 4 MG: 2 INJECTION INTRAMUSCULAR; INTRAVENOUS at 10:33

## 2025-02-28 RX ADMIN — ROPINIROLE HYDROCHLORIDE 0.5 MG: 0.25 TABLET, FILM COATED ORAL at 10:17

## 2025-02-28 RX ADMIN — DEXAMETHASONE 4 MG: 4 TABLET ORAL at 06:28

## 2025-02-28 RX ADMIN — GABAPENTIN 400 MG: 100 CAPSULE ORAL at 10:15

## 2025-02-28 RX ADMIN — ASPIRIN 81 MG: 81 TABLET, CHEWABLE ORAL at 10:16

## 2025-02-28 RX ADMIN — AMLODIPINE BESYLATE 2.5 MG: 2.5 TABLET ORAL at 10:16

## 2025-02-28 RX ADMIN — METOPROLOL SUCCINATE 12.5 MG: 25 TABLET, EXTENDED RELEASE ORAL at 10:16

## 2025-02-28 NOTE — PLAN OF CARE
Problem: Discharge Planning  Goal: Discharge to home or other facility with appropriate resources  Outcome: Progressing  Flowsheets (Taken 2/27/2025 2000)  Discharge to home or other facility with appropriate resources:   Identify barriers to discharge with patient and caregiver   Arrange for needed discharge resources and transportation as appropriate   Identify discharge learning needs (meds, wound care, etc)     Problem: Pain  Goal: Verbalizes/displays adequate comfort level or baseline comfort level  Outcome: Progressing     Problem: Safety - Adult  Goal: Free from fall injury  Outcome: Progressing

## 2025-02-28 NOTE — PROGRESS NOTES
Attempted to schedule PCP hospital follow up appointment. Unable to reach anyone, unable to leave voicemail. [facility] information on AVS for patient resource.  Pending patient discharge.

## 2025-02-28 NOTE — PROGRESS NOTES
TRANSFER - IN REPORT:    Verbal report received from Maria T on Samy Hunter Jr  being received from [facility] for routine progression of care. Report consisted of patient’s Situation, Background, Assessment and Recommendations(SBAR).  Opportunity for questions and clarification was provided. Assessment completed upon patient’s arrival to IVCU and care assumed.       PROCEDURE SITE CHECK:    Procedure site: Right groin sheath intact. Site is CDI. Patient currently c/o pain/discomfort reported at procedure site and down back. Medicated for pain per orders.     1615: Requested IV antihypertensive as pt's SBP's in 210's. Hospitalist requested RN notify cardiology. Dr Hartmann contacted and new orders received.     1745: Angiomax discontinued per order.     1830: Patient expressed some relief with IV morphine. Hospitalist changed medication time so patient could have it more frequently. Patient and family educated on pain medication.     1930: Bedside shift report given to LORETTA Echeverria.

## 2025-02-28 NOTE — CARE COORDINATION
Care Management Initial Assessment       RUR: 11% \"low risk\"  Readmission? No  1st IM letter given? Yes, given by Patient Access Team on 2/27/25  1st  letter given: No    Initial note - 1119 M: Chart reviewed. CM met with pt at the bedside to introduce self and role. Verified contact information and demographics. Pt resides with pt spouse in a two level home with 3 CARL. Pt PCP is Dr. Lewis with the last visit being within the last few weeks. Preferred pharmacy is [facility]. Pt has no hx of HH services. Pt has a hx of an IPR stay at [facility]. Pt is independent with ADL's and has no DME needs. Pt has a cane and walker at home. Pt is an active . Pt has no ACP on file; pt is a FULL code. Pt family to transport pt home upon time of d/c. Full assessment below:     02/28/25 1119   Service Assessment   Patient Orientation Alert and Oriented;Person;Place;Situation;Self   Cognition Alert   History Provided By Patient   Primary Caregiver Self   Support Systems Spouse/Significant Other   Patient's Healthcare Decision Maker is: Legal Next of Kin   PCP Verified by CM Yes  (Dr. Lewis)   Last Visit to PCP Within last 3 months   Prior Functional Level Independent in ADLs/IADLs   Current Functional Level Independent in ADLs/IADLs   Can patient return to prior living arrangement Yes   Ability to make needs known: Good   Family able to assist with home care needs: Yes   Would you like for me to discuss the discharge plan with any other family members/significant others, and if so, who? Yes  (Hyacinth Hunter (spouse))   Financial Resources Medicare  (Medicare Part A and B, BCBS)   Community Resources None   Social/Functional History   Lives With Spouse   Type of Home House   Home Layout Two level   Home Access Stairs to enter with rails   Entrance Stairs - Number of Steps 3 CARL   Home Equipment Walker - Rolling;Cane   Receives Help From Family   Prior Level of Assist for ADLs Independent   Prior Level of Assist for  Homemaking Independent   Ambulation Assistance Independent   Prior Level of Assist for Transfers Independent   Active  Yes   Mode of Transportation Car   Occupation Retired   Discharge Planning   Type of Residence House   Living Arrangements Spouse/Significant Other   Current Services Prior To Admission None   Potential Assistance Needed N/A   DME Ordered? No   Potential Assistance Purchasing Medications No   Type of Home Care Services None   Patient expects to be discharged to: House   Services At/After Discharge   Transition of Care Consult (CM Consult) Discharge Planning   Services At/After Discharge None   [place] Resource Information Provided? No   Mode of Transport at Discharge Other (see comment)  (Spouse)   Hospital Transport Time of Discharge 1400   Confirm Follow Up Transport Family   Condition of Participation: Discharge Planning   The Plan for Transition of Care is related to the following treatment goals: Return home independently   The Patient and/or Patient Representative was provided with a Choice of Provider? Patient   The Patient and/Or Patient Representative agree with the Discharge Plan? Yes     Advance Care Planning     General Advance Care Planning (ACP) Conversation    Date of Conversation: 2/28/2025  Conducted with: Patient with Decision Making Capacity  Other persons present: None    Healthcare Decision Maker:   Primary Decision Maker: Hyacinth Hunter - Spouse - [phone]     Today we documented Decision Maker(s) consistent with Legal Next of Kin hierarchy.  Content/Action Overview:  Has NO ACP documents-Information provided  Reviewed DNR/DNI and patient elects Full Code (Attempt Resuscitation)      Length of Voluntary ACP Conversation in minutes:  <16 minutes (Non-Billable)    SUSAN JAVED   [phone]

## 2025-02-28 NOTE — PROGRESS NOTES
I have reviewed Discharge Instructions with the patient. The patient verbalized understanding. Discharge medications reviewed with patient along with appropriate educational materials.  Opportunity for questions and clarification was provided. All lines removed without difficulty. Cath site is clean, dry, and intact. Patient's belongings gathered and with patient. Patient is ready for discharge.

## 2025-02-28 NOTE — PROGRESS NOTES
Predictive Model Details          32 (Caution)  Factor Value    Calculated 2/27/2025 23:31 42% Age 89 years old    Deterioration Index Model 28% Neurological exam X     9% WBC count abnormal (12.8 K/uL)     7% Systolic 150     6% Potassium 4.4 mmol/L     4% Sodium abnormal (135 mmol/L)     2% Respiratory rate 17     1% Pulse 89     0% Hematocrit 42.8 %     0% Pulse oximetry 96 %     0% Temperature 98.1 °F (36.7 °C)         End of Shift Note    Bedside shift change report given to Raisa (oncoming nurse) by ESME SHERIFF RN (offgoing nurse).  Report included the following information Intake/Output, MAR, Recent Results, and Cardiac Rhythm nsr    Shift worked:  7p     Shift summary and any significant changes:    0000 Patient impulsive at times not keeping leg straight; site remains CDI, no bruising noted,  PPP     0630 Patient up to the chair with assist; unsteady on his feet.  Voices no c/o incisional site pain.  Site remains CDI.      Concerns for physician to address:       Zone phone for oncoming shift:   [phone]       Activity:  Level of Assistance: Standby assist, set-up cues, supervision of patient - no hands on  Number times ambulated in hallways past shift: 0  Number of times OOB to chair past shift: 0    Cardiac:   Cardiac Monitoring: Yes      Cardiac Rhythm: Sinus rhythm    Access:  Current line(s): PIV     Genitourinary:   Urinary Status: Voiding, External catheter    Respiratory:   O2 Device: None (Room air)  Chronic home O2 use?: NO  Incentive spirometer at bedside: NO    GI:  Last BM (including prior to admit): 02/27/25  Current diet:  ADULT DIET; Regular; Low Fat/Low Chol/High Fiber/2 gm Na  Passing flatus: NO    Pain Management:   Patient states pain is manageable on current regimen: YES    Skin:     Interventions: Wound Offloading (Prevention Methods): Bed, pressure redistribution/air, Bed, pressure reduction mattress, Elevate heels, Pillows, Repositioning    Patient Safety:  Fall Risk: Nursing

## 2025-02-28 NOTE — DISCHARGE INSTRUCTIONS
[address], [address]   [phone]  [place] [address]    [email or web address]    Patient Discharge Instructions    Samy Hunter  / [id number] : 1935    Admitted 2025 Discharged: 2025       It is important that you take the medication exactly as they are prescribed.   Keep your medication in the bottles provided by the pharmacist and keep a list of the medication names, dosages, and times to be taken in your wallet.   Do not take other medications without consulting your doctor.     BRING ALL OF YOUR MEDICINES TO YOUR OFFICE VISIT.    Follow-up with MINOO Castro NP in 2 weeks.      Cardiac Catheterization  Discharge Instructions    Do not drive, operate any machinery, or sign any legal documents for 24 hours after your procedure.  You must have someone to drive you home.    You may take a shower 24 hours after your cardiac catheterization.  Be sure to get the dressing wet and then remove it; gently wash the area with warm soapy water.  Pat dry and leave open to air.  To help prevent infections, be sure to keep the cath site clean and dry.  No lotions, creams, powders, ointments, etc. in the cath site for approximately 1 week.    Do not take a tub bath, get in a hot tub or swimming pool for approximately 5 days or until the cath site is completely healed.      No strenuous activity or heavy lifting over 10 lbs. for 7 days.    Drink plenty of fluids for 24-48 hours after your cath to flush the contrast dye from your kidneys. No alcoholic beverages for 24 hours.  You may resume your previous diet (low fat, low cholesterol) after your cath.      After your cath, some bruising or discomfort is common during the healing process.  Tylenol, 1-2 tablets every 6 hours as needed, is recommended if you experience any discomfort.  If you experience any signs or symptoms of infection such as fever, chills, or poorly healing incision, persistent tenderness or swelling in

## 2025-02-28 NOTE — PROGRESS NOTES
[facility]     Progress Note    Attending Supervising Physician's Attestation Statement  The patient is a 89 y.o. male. I have performed a history and physical examination of the patient. I discussed the case with the nurse practitioner.    I reviewed the patient's Past Medical History, Past Surgical History, Medications, and Allergies.     Physical Exam:  Vitals:    02/27/25 1945 02/27/25 2245 02/28/25 0330 02/28/25 0825   BP:    123/66   Pulse:    83   Resp:   18 17   Temp: 98.8 °F (37.1 °C) 98.1 °F (36.7 °C)  98 °F (36.7 °C)   TempSrc: Oral Oral Oral Oral   SpO2:    100%   Weight:       Height:           Feels great.    VSS  NAD  CTAB  Nml S1, S2, RRR, no mrg  NABS  No FRANC    CP  Elevated Trop  New LBB  S/p cath with PCI of LAD w/ 1 AGUILA    - ok for discharge  - cont ASA/plavix.  Needs for at least 3 months before considering holding for surgery  - cont norvasc, statin, lisinopril, metoprolol  - f/u with Dr. Miller in 3-4 weeks  -     Impression/Plan  I reviewed and agree with the findings and plan documented in her note .    Electronically signed by Varghese Hartmann DO on 2/28/25 at 10:33 AM EST      2/28/2025 8:51 AM  NAME: Samy Hunter Jr   MRN:  [id number]   Admit Diagnosis: Dehydration [E86.0]  Myelomalacia (HCC) [G95.89]  Acute coronary syndrome (HCC) [I24.9]  NSTEMI (non-ST elevated myocardial infarction) (HCC) [I21.4]  Spinal stenosis of lumbar region without neurogenic claudication [M48.061]  Intractable pain [R52]  Intractable nausea and vomiting [R11.2]  Acute on chronic low back pain [M54.50, G89.29]     Problem List:     CAD s/p NSTEMI and LAD AGUILA (2/27/2025)  LBBB (new onset)  Hypertension  Hyperlipidemia  Back Pain with Severe Spinal Stenosis        Assessment/Plan:     --LHC with AGUILA to mid LAD yesterday. Optimize modifiable risk factors.  (goal less than 55). ASA, Added Statin. Added Metoprolol XL 12.5 . Reduced Norvasc to accommodate. Can discharge home from a

## 2025-03-01 LAB
EKG ATRIAL RATE: 82 BPM
EKG DIAGNOSIS: NORMAL
EKG P AXIS: 62 DEGREES
EKG P-R INTERVAL: 168 MS
EKG Q-T INTERVAL: 394 MS
EKG QRS DURATION: 98 MS
EKG QTC CALCULATION (BAZETT): 460 MS
EKG R AXIS: 37 DEGREES
EKG T AXIS: 23 DEGREES
EKG VENTRICULAR RATE: 82 BPM

## 2025-03-02 LAB
BACTERIA SPEC CULT: NORMAL
BACTERIA SPEC CULT: NORMAL
SERVICE CMNT-IMP: NORMAL
SERVICE CMNT-IMP: NORMAL

## 2025-03-02 NOTE — DISCHARGE SUMMARY
taking these medications      indomethacin 50 MG capsule  Commonly known as: INDOCIN            ASK your doctor about these medications      oxyCODONE 5 MG immediate release tablet  Commonly known as: Roxicodone  Take 1 tablet by mouth every 6 hours as needed for Pain for up to 3 days. Intended supply: 3 days. Take lowest dose possible to manage pain Max Daily Amount: 20 mg  Ask about: Should I take this medication?               Where to Get Your Medications        These medications were sent to [facility]/pharmacy #[id number] - [place], [place] - [address] - P [phone] - F [phone]  [address] [address]      Phone: [phone]   amLODIPine 2.5 MG tablet  aspirin 81 MG chewable tablet  atorvastatin 40 MG tablet  clopidogrel 75 MG tablet  metoprolol succinate 25 MG extended release tablet             DISPOSITION:    Home with Family: y      Home with HH/PT/OT/RN:    SNF/LTC:    ROBERT:    OTHER:            Code status: Full  Recommended diet: cardiac diet and diabetic diet  Recommended activity: activity as tolerated  Wound care: See surgical/procedure care instructions      Follow up with:   PCP : HETAL Lewis MD Reis, Abilio A, MD  [address]  [address]  [facility] [id number] [phone]    Follow up  As needed    Alexandra Grider, MINOO - NP  [address]  [address]  [facility] [address]  [phone]    Schedule an appointment as soon as possible for a visit in 1 month(s)      HETAL Lewis MD  [address] [id number] [phone]    Schedule an appointment as soon as possible for a visit in 1 week(s)  To schedule your PCP hospital follow up.    [facility] [address]  [address]  [facility] [address]  [phone]  Follow up  As needed - [facility] is a mobile urgent care provider that comes to your home. You may call them if you would like to set up an appt to be seen for a follow up while waiting

## 2025-03-03 ENCOUNTER — TELEPHONE (OUTPATIENT)
Facility: CLINIC | Age: 89
End: 2025-03-03

## 2025-03-03 LAB — ECHO BSA: 2.13 M2

## 2025-03-03 NOTE — PROCEDURES
LAD had diffuse mild disease and wraps around the apex.  The left circumflex was a large dominant vessel.  OM1 was a moderate-sized vessel without significant disease.  OM2 was a large vessel without significant disease.  There was a large posterolateral branch without significant disease.  The AV groove circumflex had a 40% narrowing between the posterolateral branch and a PDA branch.  The PDA was a large vessel off the terminal circumflex with no significant disease.  The right coronary artery was a small nondominant vessel with a 50% to 60% mid stenosis.  It gave off some RV marginal branches, but no other significant branches were seen.    CONCLUSIONS:    1. Diffuse coronary artery disease with a high-grade stenosis of the mid LAD as described above.  2. Preserved overall LV systolic function with mild wall motion abnormality noted above.  3. Mildly elevated LVEDP.    RECOMMENDATIONS:  The patient will be considered for percutaneous intervention, which will be reported separately.        MYRIAM MILLER MD      BKH/AQS  D:  03/03/2025 14:43:02  T:  03/03/2025 14:55:17  JOB #:  [id number]    CC:   [facility] Chart        Myriam Miller MD

## 2025-03-04 ENCOUNTER — TELEMEDICINE (OUTPATIENT)
Facility: CLINIC | Age: 89
End: 2025-03-04

## 2025-03-04 DIAGNOSIS — I10 PRIMARY HYPERTENSION: ICD-10-CM

## 2025-03-04 DIAGNOSIS — I21.4 NSTEMI (NON-ST ELEVATED MYOCARDIAL INFARCTION) (HCC): ICD-10-CM

## 2025-03-04 DIAGNOSIS — Z98.890 S/P LUMBAR LAMINECTOMY: ICD-10-CM

## 2025-03-04 DIAGNOSIS — L30.9 DERMATITIS: Primary | ICD-10-CM

## 2025-03-04 LAB
BACTERIA SPEC CULT: NORMAL
BACTERIA SPEC CULT: NORMAL
EKG ATRIAL RATE: 88 BPM
EKG DIAGNOSIS: NORMAL
EKG P AXIS: 73 DEGREES
EKG P-R INTERVAL: 192 MS
EKG Q-T INTERVAL: 410 MS
EKG QRS DURATION: 156 MS
EKG QTC CALCULATION (BAZETT): 496 MS
EKG R AXIS: -58 DEGREES
EKG T AXIS: 102 DEGREES
EKG VENTRICULAR RATE: 88 BPM
SERVICE CMNT-IMP: NORMAL
SERVICE CMNT-IMP: NORMAL

## 2025-03-04 RX ORDER — PREDNISONE 10 MG/1
TABLET ORAL
Qty: 21 EACH | Refills: 0 | Status: SHIPPED | OUTPATIENT
Start: 2025-03-04

## 2025-03-04 RX ORDER — CEFDINIR 300 MG/1
300 CAPSULE ORAL 2 TIMES DAILY
Qty: 14 CAPSULE | Refills: 0 | Status: SHIPPED | OUTPATIENT
Start: 2025-03-04 | End: 2025-03-11

## 2025-03-04 NOTE — PROGRESS NOTES
Samy NORRIS Minianais  is a 89 y.o. male     Chief Complaint   Patient presents with    Rash       There were no vitals taken for this visit.    Health Maintenance Due   Topic Date Due    DTaP/Tdap/Td vaccine (1 - Tdap) Never done    Shingles vaccine (1 of 2) Never done    Pneumococcal 50+ years Vaccine (1 of 1 - PCV) Never done    Respiratory Syncytial Virus (RSV) Pregnant or age 60 yrs+ (1 - 1-dose 75+ series) Never done    Flu vaccine (1) 08/01/2024    COVID-19 Vaccine (4 - 2024-25 season) 09/01/2024         \"Have you been to the ER, urgent care clinic since your last visit?  Hospitalized since your last visit?\"    Yes, on file    “Have you seen or consulted any other health care providers outside of [facility] since your last visit?”    NO

## 2025-03-04 NOTE — PROGRESS NOTES
Samy Hunter Jr is a 89 y.o. male and presents with Rash  .    Subjective:  Mr. Hunter presents today for a virtual visit via video for a rash.  He was recently seen in the ER and admitted with a non-ST elevated MI.  He has been having significant back pain and has a history of significant degenerative arthritis and spinal stenosis of the lumbar spine.  He is now on additional medicines including atorvastatin 40 mg daily, aspirin 81 mg daily, clopidogrel 75 mg daily and metoprolol 25 mg 1/2 tablet daily.  He woke up in the middle the night last night with a diffuse rash on his back and torso which has been itching quite a bit.  He has not taken medication for this.    Past Medical History:   Diagnosis Date    Allergic rhinitis 12/15/2017    Arthritis of knee, left 12/15/2017    Aseptic meningitis     Chronic pain     chronic back pain    Colon polyps     Constipation 12/15/2017    Dysphagia 12/15/2017    Elevated blood pressure reading 12/15/2017    Fatigue 12/15/2017    Headache 12/15/2017    Hematuria 12/15/2017    Herpes zoster dermatitis 12/15/2017    Hyperkalemia 12/15/2017    Hypertension 12/15/2017    Insomnia 12/15/2017    Osteoarthritis 12/15/2017    Otitis externa 12/15/2017    Prostate cancer screening 12/15/2017    Right groin hernia     Sciatica 12/15/2017    Sciatica of left side associated with disorder of lumbosacral spine 12/15/2017    Shingles     Spinal stenosis 12/15/2017     Past Surgical History:   Procedure Laterality Date    CATARACT REMOVAL Bilateral     with lens implants    COLONOSCOPY      COLONOSCOPY N/A 11/29/2018    COLONOSCOPY performed by Julito Hamilton MD at [facility] ENDOSCOPY    COLONOSCOPY,DIAGNOSTIC  11/29/2018         COLONOSCOPY,DIAGNOSTIC  11/11/2014         EGD INSERT GUIDE WIRE DILATOR PASSAGE ESOPHAGUS  10/17/2013         EGD TRANSORAL BIOPSY SINGLE/MULTIPLE  10/17/2013         HERNIA REPAIR  06/05/2019    HERNIA REPAIR Left     ORTHOPEDIC SURGERY  08/2018    jose

## 2025-03-10 ENCOUNTER — OFFICE VISIT (OUTPATIENT)
Facility: CLINIC | Age: 89
End: 2025-03-10

## 2025-03-10 VITALS
DIASTOLIC BLOOD PRESSURE: 65 MMHG | WEIGHT: 183 LBS | BODY MASS INDEX: 23.49 KG/M2 | RESPIRATION RATE: 19 BRPM | TEMPERATURE: 98.9 F | OXYGEN SATURATION: 97 % | HEART RATE: 80 BPM | SYSTOLIC BLOOD PRESSURE: 132 MMHG | HEIGHT: 74 IN

## 2025-03-10 DIAGNOSIS — I21.4 NSTEMI (NON-ST ELEVATED MYOCARDIAL INFARCTION) (HCC): Primary | ICD-10-CM

## 2025-03-10 DIAGNOSIS — F51.01 PRIMARY INSOMNIA: ICD-10-CM

## 2025-03-10 DIAGNOSIS — M48.061 SPINAL STENOSIS, LUMBAR REGION WITHOUT NEUROGENIC CLAUDICATION: ICD-10-CM

## 2025-03-10 DIAGNOSIS — I10 PRIMARY HYPERTENSION: ICD-10-CM

## 2025-03-10 RX ORDER — METOPROLOL SUCCINATE 25 MG/1
25 TABLET, EXTENDED RELEASE ORAL DAILY
Qty: 90 TABLET | Refills: 1 | Status: SHIPPED | OUTPATIENT
Start: 2025-03-10

## 2025-03-10 RX ORDER — ALPRAZOLAM 0.5 MG
0.5 TABLET ORAL NIGHTLY PRN
Qty: 30 TABLET | Refills: 2 | Status: SHIPPED | OUTPATIENT
Start: 2025-03-10 | End: 2025-06-08

## 2025-03-10 RX ORDER — GABAPENTIN 400 MG/1
400 CAPSULE ORAL 3 TIMES DAILY
Qty: 90 CAPSULE | Refills: 1 | Status: SHIPPED | OUTPATIENT
Start: 2025-03-10 | End: 2025-06-08

## 2025-03-10 NOTE — TELEPHONE ENCOUNTER
PCP: HETAL Lewis MD    Last appt: 3/4/2025    Future Appointments   Date Time Provider Department Center   7/21/2025  3:15 PM HETAL Lewis MD [facility]       Requested Prescriptions     Pending Prescriptions Disp Refills    tiZANidine (ZANAFLEX) 4 MG tablet [Pharmacy Med Name: TIZANIDINE HCL 4 MG TABLET] 30 tablet 5     Sig: TAKE 1 TABLET BY MOUTH EVERY DAY AT NIGHT

## 2025-03-10 NOTE — PROGRESS NOTES
Samy Hunter  is a 89 y.o. male     Chief Complaint   Patient presents with    Follow-Up from Hospital       /65 (BP Site: Left Upper Arm, Patient Position: Sitting, BP Cuff Size: Large Adult)   Pulse 80   Temp 98.9 °F (37.2 °C) (Temporal)   Resp 19   Ht 1.88 m (6' 2\")   Wt 83 kg (183 lb)   SpO2 97%   BMI 23.50 kg/m²     Health Maintenance Due   Topic Date Due    DTaP/Tdap/Td vaccine (1 - Tdap) Never done    Shingles vaccine (1 of 2) Never done    Pneumococcal 50+ years Vaccine (1 of 1 - PCV) Never done    Respiratory Syncytial Virus (RSV) Pregnant or age 60 yrs+ (1 - 1-dose 75+ series) Never done    Flu vaccine (1) 08/01/2024    COVID-19 Vaccine (4 - 2024-25 season) 09/01/2024         \"Have you been to the ER, urgent care clinic since your last visit?  Hospitalized since your last visit?\"    [facility] D/C 2/28/25     “Have you seen or consulted any other health care providers outside of [facility] since your last visit?”    NO                   
ropinirole      In the interim he had presented for a virtual visit last week with a rash.  He was started on cefdinir and a Sterapred Dosepak and his symptoms resolved promptly.  He was concerned it was related to his atorvastatin so this was held over the weekend but I have advised him to resume this since he was recently diagnosed with a non-STEMI MI.  His blood pressure has been elevated.  He has no chest pain, shortness of breath, PND, orthopnea, or pedal edema  Past Medical History:   Diagnosis Date    Allergic rhinitis 12/15/2017    Arthritis of knee, left 12/15/2017    Aseptic meningitis     Chronic pain     chronic back pain    Colon polyps     Constipation 12/15/2017    Dysphagia 12/15/2017    Elevated blood pressure reading 12/15/2017    Fatigue 12/15/2017    Headache 12/15/2017    Hematuria 12/15/2017    Herpes zoster dermatitis 12/15/2017    Hyperkalemia 12/15/2017    Hypertension 12/15/2017    Insomnia 12/15/2017    Osteoarthritis 12/15/2017    Otitis externa 12/15/2017    Prostate cancer screening 12/15/2017    Right groin hernia     Sciatica 12/15/2017    Sciatica of left side associated with disorder of lumbosacral spine 12/15/2017    Shingles     Spinal stenosis 12/15/2017     Past Surgical History:   Procedure Laterality Date    CATARACT REMOVAL Bilateral     with lens implants    COLONOSCOPY      COLONOSCOPY N/A 11/29/2018    COLONOSCOPY performed by Julito Hamilton MD at [facility] ENDOSCOPY    COLONOSCOPY,DIAGNOSTIC  11/29/2018         COLONOSCOPY,DIAGNOSTIC  11/11/2014         EGD INSERT GUIDE WIRE DILATOR PASSAGE ESOPHAGUS  10/17/2013         EGD TRANSORAL BIOPSY SINGLE/MULTIPLE  10/17/2013         HERNIA REPAIR  06/05/2019    HERNIA REPAIR Left     ORTHOPEDIC SURGERY  08/2018    jose replacement, by Dr. Rosen    ORTHOPEDIC SURGERY  10/6/15    LEFT L5-S1 MICRODISCECTOMY     ORTHOPEDIC SURGERY  2008    rods placed in back    OTHER SURGICAL HISTORY      steroid injection for back pain    TONSILLECTOMY

## 2025-06-10 DIAGNOSIS — M48.061 SPINAL STENOSIS, LUMBAR REGION WITHOUT NEUROGENIC CLAUDICATION: ICD-10-CM

## 2025-06-11 RX ORDER — GABAPENTIN 400 MG/1
CAPSULE ORAL
Qty: 90 CAPSULE | Refills: 2 | Status: SHIPPED | OUTPATIENT
Start: 2025-06-11 | End: 2025-09-09

## 2025-07-21 ENCOUNTER — OFFICE VISIT (OUTPATIENT)
Facility: CLINIC | Age: 89
End: 2025-07-21
Payer: MEDICARE

## 2025-07-21 VITALS
SYSTOLIC BLOOD PRESSURE: 140 MMHG | TEMPERATURE: 97.7 F | HEART RATE: 73 BPM | WEIGHT: 183.4 LBS | BODY MASS INDEX: 23.54 KG/M2 | HEIGHT: 74 IN | DIASTOLIC BLOOD PRESSURE: 60 MMHG | OXYGEN SATURATION: 98 % | RESPIRATION RATE: 18 BRPM

## 2025-07-21 DIAGNOSIS — Z79.899 ON STATIN THERAPY: ICD-10-CM

## 2025-07-21 DIAGNOSIS — I10 PRIMARY HYPERTENSION: Primary | ICD-10-CM

## 2025-07-21 DIAGNOSIS — I25.119 CORONARY ARTERY DISEASE INVOLVING NATIVE CORONARY ARTERY OF NATIVE HEART WITH ANGINA PECTORIS: ICD-10-CM

## 2025-07-21 DIAGNOSIS — R06.02 SHORT OF BREATH ON EXERTION: ICD-10-CM

## 2025-07-21 DIAGNOSIS — Z98.890 S/P LUMBAR LAMINECTOMY: ICD-10-CM

## 2025-07-21 PROCEDURE — G8420 CALC BMI NORM PARAMETERS: HCPCS | Performed by: INTERNAL MEDICINE

## 2025-07-21 PROCEDURE — 1126F AMNT PAIN NOTED NONE PRSNT: CPT | Performed by: INTERNAL MEDICINE

## 2025-07-21 PROCEDURE — 1036F TOBACCO NON-USER: CPT | Performed by: INTERNAL MEDICINE

## 2025-07-21 PROCEDURE — 1123F ACP DISCUSS/DSCN MKR DOCD: CPT | Performed by: INTERNAL MEDICINE

## 2025-07-21 PROCEDURE — G8427 DOCREV CUR MEDS BY ELIG CLIN: HCPCS | Performed by: INTERNAL MEDICINE

## 2025-07-21 PROCEDURE — 99214 OFFICE O/P EST MOD 30 MIN: CPT | Performed by: INTERNAL MEDICINE

## 2025-07-21 PROCEDURE — 1159F MED LIST DOCD IN RCRD: CPT | Performed by: INTERNAL MEDICINE

## 2025-07-22 LAB
ALBUMIN SERPL-MCNC: 4 G/DL (ref 3.5–5)
ALBUMIN/GLOB SERPL: 1.6 (ref 1.1–2.2)
ALP SERPL-CCNC: 76 U/L (ref 45–117)
ALT SERPL-CCNC: 16 U/L (ref 12–78)
ANION GAP SERPL CALC-SCNC: 3 MMOL/L (ref 2–12)
AST SERPL-CCNC: 16 U/L (ref 15–37)
BILIRUB SERPL-MCNC: 0.5 MG/DL (ref 0.2–1)
BUN SERPL-MCNC: 13 MG/DL (ref 6–20)
BUN/CREAT SERPL: 15 (ref 12–20)
CALCIUM SERPL-MCNC: 9.5 MG/DL (ref 8.5–10.1)
CHLORIDE SERPL-SCNC: 106 MMOL/L (ref 97–108)
CHOLEST SERPL-MCNC: 151 MG/DL
CK SERPL-CCNC: 114 U/L (ref 39–308)
CO2 SERPL-SCNC: 30 MMOL/L (ref 21–32)
COMMENT:: NORMAL
CREAT SERPL-MCNC: 0.84 MG/DL (ref 0.7–1.3)
GLOBULIN SER CALC-MCNC: 2.5 G/DL (ref 2–4)
GLUCOSE SERPL-MCNC: 91 MG/DL (ref 65–100)
HDLC SERPL-MCNC: 49 MG/DL
HDLC SERPL: 3.1 (ref 0–5)
LDLC SERPL CALC-MCNC: 85.8 MG/DL (ref 0–100)
NT PRO BNP: 187 PG/ML
POTASSIUM SERPL-SCNC: 4.4 MMOL/L (ref 3.5–5.1)
PROT SERPL-MCNC: 6.5 G/DL (ref 6.4–8.2)
SODIUM SERPL-SCNC: 139 MMOL/L (ref 136–145)
SPECIMEN HOLD: NORMAL
TRIGL SERPL-MCNC: 81 MG/DL
VLDLC SERPL CALC-MCNC: 16.2 MG/DL

## 2025-08-02 DIAGNOSIS — I10 PRIMARY HYPERTENSION: ICD-10-CM

## 2025-08-05 RX ORDER — LISINOPRIL 10 MG/1
10 TABLET ORAL DAILY
Qty: 30 TABLET | Refills: 5 | Status: SHIPPED | OUTPATIENT
Start: 2025-08-05

## 2025-08-14 ENCOUNTER — RESULTS FOLLOW-UP (OUTPATIENT)
Facility: CLINIC | Age: 89
End: 2025-08-14

## 2025-08-14 DIAGNOSIS — F51.01 PRIMARY INSOMNIA: ICD-10-CM

## 2025-08-14 RX ORDER — ALPRAZOLAM 0.5 MG
0.5 TABLET ORAL NIGHTLY PRN
Qty: 30 TABLET | Refills: 2 | Status: SHIPPED | OUTPATIENT
Start: 2025-08-14 | End: 2025-11-12

## 2025-08-18 ENCOUNTER — OFFICE VISIT (OUTPATIENT)
Facility: CLINIC | Age: 89
End: 2025-08-18

## 2025-08-18 VITALS
OXYGEN SATURATION: 96 % | BODY MASS INDEX: 23.05 KG/M2 | SYSTOLIC BLOOD PRESSURE: 138 MMHG | HEART RATE: 68 BPM | RESPIRATION RATE: 18 BRPM | DIASTOLIC BLOOD PRESSURE: 62 MMHG | TEMPERATURE: 98.4 F | WEIGHT: 179.6 LBS | HEIGHT: 74 IN

## 2025-08-18 DIAGNOSIS — I10 PRIMARY HYPERTENSION: Primary | ICD-10-CM

## 2025-08-18 DIAGNOSIS — I25.119 CORONARY ARTERY DISEASE INVOLVING NATIVE CORONARY ARTERY OF NATIVE HEART WITH ANGINA PECTORIS: ICD-10-CM

## 2025-08-19 LAB
ANION GAP SERPL CALC-SCNC: 10 MMOL/L (ref 2–14)
BUN SERPL-MCNC: 16 MG/DL (ref 8–23)
BUN/CREAT SERPL: 18 (ref 12–20)
CALCIUM SERPL-MCNC: 9.6 MG/DL (ref 8.2–9.6)
CHLORIDE SERPL-SCNC: 96 MMOL/L (ref 98–107)
CO2 SERPL-SCNC: 29 MMOL/L (ref 20–29)
CREAT SERPL-MCNC: 0.91 MG/DL (ref 0.7–1.2)
GLUCOSE SERPL-MCNC: 110 MG/DL (ref 65–100)
POTASSIUM SERPL-SCNC: 4.7 MMOL/L (ref 3.5–5.1)
SODIUM SERPL-SCNC: 135 MMOL/L (ref 136–145)

## 2025-09-05 ENCOUNTER — OFFICE VISIT (OUTPATIENT)
Facility: CLINIC | Age: 89
End: 2025-09-05

## 2025-09-05 VITALS
DIASTOLIC BLOOD PRESSURE: 68 MMHG | BODY MASS INDEX: 23.05 KG/M2 | OXYGEN SATURATION: 97 % | HEART RATE: 71 BPM | HEIGHT: 74 IN | RESPIRATION RATE: 18 BRPM | TEMPERATURE: 98.5 F | WEIGHT: 179.6 LBS | SYSTOLIC BLOOD PRESSURE: 132 MMHG

## 2025-09-05 DIAGNOSIS — I10 PRIMARY HYPERTENSION: Primary | ICD-10-CM

## 2025-09-05 DIAGNOSIS — E87.1 HYPONATREMIA: ICD-10-CM

## 2025-09-06 LAB
ANION GAP SERPL CALC-SCNC: 10 MMOL/L (ref 2–14)
BUN SERPL-MCNC: 12 MG/DL (ref 8–23)
BUN/CREAT SERPL: 12 (ref 12–20)
CALCIUM SERPL-MCNC: 9.4 MG/DL (ref 8.2–9.6)
CHLORIDE SERPL-SCNC: 102 MMOL/L (ref 98–107)
CO2 SERPL-SCNC: 29 MMOL/L (ref 20–29)
CREAT SERPL-MCNC: 0.95 MG/DL (ref 0.7–1.2)
GLUCOSE SERPL-MCNC: 101 MG/DL (ref 65–100)
POTASSIUM SERPL-SCNC: 4.3 MMOL/L (ref 3.5–5.1)
SODIUM SERPL-SCNC: 141 MMOL/L (ref 136–145)

## (undated) DEVICE — STRAP,POSITIONING,KNEE/BODY,FOAM,4X60": Brand: MEDLINE

## (undated) DEVICE — DRAIN SURG 10FR L1/8IN DIA3.2MM SIL CHN RND HUBLESS FULL

## (undated) DEVICE — SYR 50ML LR LCK 1ML GRAD NSAF --

## (undated) DEVICE — TOOL 14MH30 LEGEND 14CM 3MM: Brand: MIDAS REX ™

## (undated) DEVICE — CATH IV AUTOGRD BC PNK 20GA 25 -- INSYTE

## (undated) DEVICE — 3M™ IOBAN™ 2 ANTIMICROBIAL INCISE DRAPE 6640EZ: Brand: IOBAN™ 2

## (undated) DEVICE — INFECTION CONTROL KIT SYS

## (undated) DEVICE — NEEDLE HYPO 18GA L1.5IN PNK S STL HUB POLYPR SHLD REG BVL

## (undated) DEVICE — APPLICATOR BNDG 1MM ADH PREMIERPRO EXOFIN

## (undated) DEVICE — SOLUTION IRRIG 1000ML H2O STRL BLT

## (undated) DEVICE — Z CONVERTED USE 2107985 COVER FLROSCP W36XL28IN 4 SIDE ADH

## (undated) DEVICE — SUTURE V-LOC 180 SZ 0 L12IN ABSRB GRN L37MM GS-21 1/2 CIR VLOCL0316

## (undated) DEVICE — TRAY CATH 16F DRN BG LTX -- CONVERT TO ITEM 363158

## (undated) DEVICE — SUTURE SZ 0 27IN 5/8 CIR UR-6  TAPER PT VIOLET ABSRB VICRYL J603H

## (undated) DEVICE — BASIN EMSIS 16OZ GRAPHITE PLAS KID SHP MOLD GRAD FOR ORAL

## (undated) DEVICE — PREP SKN PREVAIL 40ML APPL --

## (undated) DEVICE — SYR 10ML LUER LOK 1/5ML GRAD --

## (undated) DEVICE — SUTURE VCRL SZ 1 L18IN ABSRB VLT CT-1 L36MM 1/2 CIR J741D

## (undated) DEVICE — ADHESIVE SKIN CLOSURE 4X22 CM PREMIERPRO EXOFINFUSION DISP

## (undated) DEVICE — SOLUTION IRRIG 3000ML 0.9% SOD CHL FLX CONT 0797208] ICU MEDICAL INC]

## (undated) DEVICE — HANDLE LT SNAP ON ULT DURABLE LENS FOR TRUMPF ALC DISPOSABLE

## (undated) DEVICE — GOWN,SIRUS,NONRNF,SETINSLV,2XL,18/CS: Brand: MEDLINE

## (undated) DEVICE — BONE WAX WHITE: Brand: BONE WAX WHITE

## (undated) DEVICE — Device

## (undated) DEVICE — KIT JACK TBL PT CARE

## (undated) DEVICE — KIT DISECT BLNT TIP TRCR W/ OVL BLLN SPCMKR PRO

## (undated) DEVICE — LOTION PREP REMV 4OZ IODO CLR TINC OF BENZ DURAPREP

## (undated) DEVICE — GAUZE SPONGES,12 PLY: Brand: CURITY

## (undated) DEVICE — SURGICAL PROCEDURE KIT GEN LAPAROSCOPY LF

## (undated) DEVICE — TOWEL 4 PLY TISS 19X30 SUE WHT

## (undated) DEVICE — (D)PREP SKN CHLRAPRP APPL 26ML -- CONVERT TO ITEM 371833

## (undated) DEVICE — STOPCOCK IV 4 W TRNSPAR

## (undated) DEVICE — NDL SPNE QNCKE 18GX3.5IN LF --

## (undated) DEVICE — SOLIDIFIER MEDC 1200ML -- CONVERT TO 356117

## (undated) DEVICE — TOWEL SURG W17XL27IN STD BLU COT NONFENESTRATED PREWASHED

## (undated) DEVICE — TUBING IRRIG L77IN DIA0.241IN L BOR FOR CYSTO W/ NVENT

## (undated) DEVICE — SOLUTION IV 1000ML 0.9% SOD CHL

## (undated) DEVICE — SLIM BODY SKIN STAPLER: Brand: APPOSE ULC

## (undated) DEVICE — 3000CC GUARDIAN II: Brand: GUARDIAN

## (undated) DEVICE — SYR 3ML LL TIP 1/10ML GRAD --

## (undated) DEVICE — KENDALL RADIOLUCENT FOAM MONITORING ELECTRODE RECTANGULAR SHAPE: Brand: KENDALL

## (undated) DEVICE — SUTURE STRATAFIX SPRL MCRYL + SZ 2-0 L18IN ABSRB UD CT-1 SXMP1B413

## (undated) DEVICE — 3M™ TEGADERM™ TRANSPARENT FILM DRESSING FRAME STYLE, 1626W, 4 IN X 4-3/4 IN (10 CM X 12 CM), 50/CT 4CT/CASE: Brand: 3M™ TEGADERM™

## (undated) DEVICE — KENDALL SCD EXPRESS SLEEVES, KNEE LENGTH, MEDIUM: Brand: KENDALL SCD

## (undated) DEVICE — MEDI-VAC NON-CONDUCTIVE SUCTION TUBING: Brand: CARDINAL HEALTH

## (undated) DEVICE — BIPOLAR FORCEPS CORD: Brand: VALLEYLAB

## (undated) DEVICE — BLADE ASSEMB CLP HAIR FINE --

## (undated) DEVICE — DRSG PATCH ANTIMIC 1INX4.0MM -- CONVERT TO ITEM 356053

## (undated) DEVICE — BLADE ES L4IN INSUL EDGE

## (undated) DEVICE — STERILE POLYISOPRENE POWDER-FREE SURGICAL GLOVES WITH EMOLLIENT COATING: Brand: PROTEXIS

## (undated) DEVICE — ELECTRODE ES 36CM LAP FLAT L HK COAT DISP CLEANCOAT

## (undated) DEVICE — NEEDLE HYPO 22GA L1.5IN BLK S STL HUB POLYPR SHLD REG BVL

## (undated) DEVICE — 1200 GUARD II KIT W/5MM TUBE W/O VAC TUBE: Brand: GUARDIAN

## (undated) DEVICE — STERILE POLYISOPRENE POWDER-FREE SURGICAL GLOVES: Brand: PROTEXIS

## (undated) DEVICE — SURGIFOAM SPNG SZ 100

## (undated) DEVICE — REM POLYHESIVE ADULT PATIENT RETURN ELECTRODE: Brand: VALLEYLAB

## (undated) DEVICE — COVER,MAYO STAND,STERILE: Brand: MEDLINE

## (undated) DEVICE — COVER,TABLE,60X90,STERILE: Brand: MEDLINE

## (undated) DEVICE — SUTURE MCRYL SZ 4-0 L27IN ABSRB UD L19MM PS-2 1/2 CIR PRIM Y426H

## (undated) DEVICE — FLOSEAL MATRIX IS INDICATED IN SURGICAL PROCEDURES (OTHER THAN IN OPHTHALMIC) AS AN ADJUNCT TO HEMOSTASIS WHEN CONTROL OF BLEEDING BY LIGATURE OR CONVENTIONALPROCEDURES IS INEFFECTIVE OR IMPRACTICAL.: Brand: FLOSEAL HEMOSTATIC MATRIX

## (undated) DEVICE — 3M™ STERI-DRAPE™ INSTRUMENT POUCH 1018: Brand: STERI-DRAPE™

## (undated) DEVICE — NEONATAL-ADULT SPO2 SENSOR: Brand: NELLCOR

## (undated) DEVICE — LAMINECTOMY RICHMOND-LF: Brand: MEDLINE INDUSTRIES, INC.

## (undated) DEVICE — BONE MARROW KIT ASPIR 11 GA

## (undated) DEVICE — SUTURE STRATAFIX SYMMETRIC SZ 1 L18IN ABSRB VLT CT1 L36CM SXPP1A404

## (undated) DEVICE — DRAPE,LAPAROTOMY,PCH,STERILE: Brand: MEDLINE

## (undated) DEVICE — SET ADMIN 16ML TBNG L100IN 2 Y INJ SITE IV PIGGY BK DISP

## (undated) DEVICE — DRAPE,REIN 53X77,STERILE: Brand: MEDLINE

## (undated) DEVICE — Z DISCONTINUED PER MEDLINE LINE GAS SAMPLING O2/CO2 LNG AD 13 FT NSL W/ TBNG FILTERLINE